# Patient Record
Sex: FEMALE | Race: WHITE | NOT HISPANIC OR LATINO | Employment: FULL TIME | URBAN - METROPOLITAN AREA
[De-identification: names, ages, dates, MRNs, and addresses within clinical notes are randomized per-mention and may not be internally consistent; named-entity substitution may affect disease eponyms.]

---

## 2018-10-25 ENCOUNTER — HOSPITAL ENCOUNTER (OUTPATIENT)
Dept: RADIOLOGY | Facility: CLINIC | Age: 58
Discharge: HOME | End: 2018-10-25
Attending: PODIATRIST
Payer: COMMERCIAL

## 2018-10-25 ENCOUNTER — OFFICE VISIT (OUTPATIENT)
Dept: PODIATRY | Facility: CLINIC | Age: 58
End: 2018-10-25
Payer: COMMERCIAL

## 2018-10-25 VITALS
DIASTOLIC BLOOD PRESSURE: 73 MMHG | WEIGHT: 203.25 LBS | BODY MASS INDEX: 30.8 KG/M2 | HEIGHT: 68 IN | SYSTOLIC BLOOD PRESSURE: 114 MMHG | HEART RATE: 76 BPM

## 2018-10-25 DIAGNOSIS — M25.572 ACUTE LEFT ANKLE PAIN: Primary | ICD-10-CM

## 2018-10-25 DIAGNOSIS — M76.822 POSTERIOR TIBIAL TENDONITIS, LEFT: ICD-10-CM

## 2018-10-25 DIAGNOSIS — M25.572 ACUTE LEFT ANKLE PAIN: ICD-10-CM

## 2018-10-25 PROCEDURE — 99999 PR PBB SHADOW E&M-NEW PATIENT-LVL III: CPT | Mod: PBBFAC,,, | Performed by: PODIATRIST

## 2018-10-25 PROCEDURE — 73610 X-RAY EXAM OF ANKLE: CPT | Mod: TC,FY,PO,LT

## 2018-10-25 PROCEDURE — 73610 X-RAY EXAM OF ANKLE: CPT | Mod: 26,LT,S$GLB, | Performed by: RADIOLOGY

## 2018-10-25 PROCEDURE — 3008F BODY MASS INDEX DOCD: CPT | Mod: CPTII,S$GLB,, | Performed by: PODIATRIST

## 2018-10-25 PROCEDURE — 99203 OFFICE O/P NEW LOW 30 MIN: CPT | Mod: S$GLB,,, | Performed by: PODIATRIST

## 2018-10-25 RX ORDER — METFORMIN HYDROCHLORIDE 500 MG/1
TABLET ORAL
COMMUNITY
End: 2019-10-28 | Stop reason: SDUPTHER

## 2018-10-25 RX ORDER — LISINOPRIL AND HYDROCHLOROTHIAZIDE 12.5; 2 MG/1; MG/1
TABLET ORAL
COMMUNITY
End: 2019-10-28 | Stop reason: CLARIF

## 2018-11-04 NOTE — PROGRESS NOTES
Subjective:      Patient ID: Nubia Flaherty is a 58 y.o. female.    Chief Complaint: Ankle Pain (left ankle)    Nubia is a 58 y.o. female who presents to the podiatry clinic  with complaint of  left foot pain. Onset of the symptoms was several weeks ago. Precipitating event: none known. Current symptoms include: ability to bear weight, but with some pain, swelling and worsening symptoms after a period of activity. Aggravating factors: any weight bearing. Symptoms have gradually worsened.  Evaluation to date: none. Treatment to date: none. Patients rates pain 1/10 on pain scale.        Review of Systems   Constitution: Negative for chills and fever.   Cardiovascular: Negative for claudication and leg swelling.   Respiratory: Negative for shortness of breath.    Skin: Negative for itching, nail changes and rash.   Musculoskeletal: Negative for muscle cramps, muscle weakness and myalgias.        Left ankle pain   Gastrointestinal: Negative for nausea and vomiting.   Neurological: Negative for focal weakness, loss of balance, numbness and paresthesias.           Objective:      Physical Exam   Constitutional: She is oriented to person, place, and time. She appears well-developed and well-nourished. No distress.   Cardiovascular:   Pulses:       Dorsalis pedis pulses are 2+ on the right side, and 2+ on the left side.        Posterior tibial pulses are 2+ on the right side, and 2+ on the left side.   < 3 sec capillary refill time to toes 1-5 bilateral. Toes and feet are warm to touch proximally with normal distal cooling b/l. There is some hair growth on the feet and toes b/l. There is no edema b/l. No spider veins or varicosities present b/l.      Musculoskeletal:   Left foot there is pain to palpation along the  PT tendon distally and with palpation to the anterior medial ankle area.     Equinus noted b/l ankles with < 10 deg DF noted. MMT 5/5 in DF/PF/Inv/Ev resistance with no reproduction of pain in any direction.  Passive range of motion of ankle and pedal joints is painless b/l.     Neurological: She is alert and oriented to person, place, and time. She has normal strength. She displays no atrophy and no tremor. No sensory deficit. She exhibits normal muscle tone.   Negative tinel sign bilateral.   Skin: Skin is warm, dry and intact. No abrasion, no bruising, no burn, no ecchymosis, no laceration, no lesion, no petechiae and no rash noted. She is not diaphoretic. No cyanosis or erythema. No pallor. Nails show no clubbing.   Skin temperature, texture and turgor within normal limits.   Psychiatric: She has a normal mood and affect. Her behavior is normal.             Assessment:       Encounter Diagnoses   Name Primary?    Acute left ankle pain Yes    Posterior tibial tendonitis, left          Plan:       Nubia was seen today for ankle pain.    Diagnoses and all orders for this visit:    Acute left ankle pain  -     X-Ray Ankle Complete Left; Future    Posterior tibial tendonitis, left  -     X-Ray Ankle Complete Left; Future      I counseled the patient on her conditions, their implications and medical management.    Patient will obtain over the counter arch supports and wear them in shoes whenever possible.  Athletic shoes intended for walking or running are usually best.    Patient will stretch the tendo achilles complex three times daily as demonstrated in the office.  Literature was dispensed illustrating proper stretching technique.    Avoid barefoot or flats, consider ankle brace if pain persists    Return in 6 weeks follow up    Darron Clements DPM

## 2019-03-04 ENCOUNTER — OFFICE VISIT (OUTPATIENT)
Dept: PODIATRY | Facility: CLINIC | Age: 59
End: 2019-03-04
Payer: COMMERCIAL

## 2019-03-04 VITALS
TEMPERATURE: 97 F | WEIGHT: 200 LBS | HEART RATE: 75 BPM | BODY MASS INDEX: 30.86 KG/M2 | SYSTOLIC BLOOD PRESSURE: 125 MMHG | DIASTOLIC BLOOD PRESSURE: 75 MMHG

## 2019-03-04 DIAGNOSIS — M72.2 PLANTAR FASCIITIS: Primary | ICD-10-CM

## 2019-03-04 DIAGNOSIS — M79.671 PAIN OF RIGHT HEEL: ICD-10-CM

## 2019-03-04 PROCEDURE — 99203 PR OFFICE/OUTPT VISIT, NEW, LEVL III, 30-44 MIN: ICD-10-PCS | Mod: 25,,, | Performed by: PODIATRIST

## 2019-03-04 PROCEDURE — 3008F BODY MASS INDEX DOCD: CPT | Mod: ,,, | Performed by: PODIATRIST

## 2019-03-04 PROCEDURE — 73630 X-RAY EXAM OF FOOT: CPT | Mod: RT,,, | Performed by: PODIATRIST

## 2019-03-04 PROCEDURE — 3008F PR BODY MASS INDEX (BMI) DOCUMENTED: ICD-10-PCS | Mod: ,,, | Performed by: PODIATRIST

## 2019-03-04 PROCEDURE — 99203 OFFICE O/P NEW LOW 30 MIN: CPT | Mod: 25,,, | Performed by: PODIATRIST

## 2019-03-04 PROCEDURE — 73630 PR  X-RAY FOOT 3+ VW: ICD-10-PCS | Mod: RT,,, | Performed by: PODIATRIST

## 2019-03-04 NOTE — PROGRESS NOTES
[address] [facility] [address]  [place] [address]  Phone: [phone]   [phone]    Patient's PCP:Ankit Hardin MD  Referring Provider: No ref. provider found    Subjective:      Chief Complaint:: Heel Pain (right foot)    JUDY Flaherty is a 59 y.o. female who presents with a complaint of right heel pain lasting since yesterday evening. Onset of the symptoms was playing ball with grandkids and must've lunged or something and now cannot put any pressure on heel at all.  Current symptoms include stabbing pain.  Aggravating factors are walking. Symptoms have gotten worse. Treatment to date have included boot cast, tennis shoes with inserts (pt assumed was PF because has had it before). Patients rates pain 8/10 on pain scale.    Pt is borderline diabetic.  Systemic Doctor: Robert Murray  Date Last Seen: November-December 2018  Blood Sugar: does not check  Hemoglobin A1c: 6.5    Vitals:    03/04/19 1014   BP: 125/75   Pulse: 75   Temp: 97 °F (36.1 °C)     Shoe Size: 9 / 10    Past Surgical History:   Procedure Laterality Date    FOOT SURGERY Left 2015    bunion     TONSILLECTOMY, ADENOIDECTOMY       Past Medical History:   Diagnosis Date    Hypertension     Kidney stones      Family History   Problem Relation Age of Onset    Heart disease Father     Diabetes Father     Heart disease Mother     Diabetes Mother     Breast cancer Maternal Aunt     Ovarian cancer Neg Hx         Social History:   Marital Status:   Alcohol History:  reports that she does not drink alcohol.  Tobacco History:  reports that she has quit smoking. She does not have any smokeless tobacco history on file.  Drug History:  reports that she does not use drugs.    Review of patient's allergies indicates:   Allergen Reactions    Codeine Tinitus    Morphine        Current Outpatient Medications   Medication Sig Dispense Refill    lisinopril-hydrochlorothiazide (PRINZIDE,ZESTORETIC) 20-12.5 mg per tablet Take by mouth.       metFORMIN (GLUCOPHAGE) 500 MG tablet Take by mouth.       No current facility-administered medications for this visit.        Review of Systems   Constitutional: Positive for chills, fatigue and fever. Negative for unexpected weight change.   HENT: Negative for hearing loss and trouble swallowing.    Eyes: Negative for photophobia and visual disturbance.   Respiratory: Positive for cough. Negative for shortness of breath and wheezing.    Cardiovascular: Positive for leg swelling. Negative for chest pain and palpitations.   Gastrointestinal: Negative for abdominal pain and nausea.   Genitourinary: Negative for dysuria and frequency.   Musculoskeletal: Positive for joint swelling. Negative for arthralgias and back pain.   Skin: Negative for rash.   Neurological: Negative for tremors, seizures, weakness, numbness and headaches.   Hematological: Does not bruise/bleed easily.         Objective:        Physical Exam:   Foot Exam    General  General Appearance: appears stated age and healthy   Orientation: alert and oriented to person, place, and time   Affect: appropriate   Gait: unimpaired       Right Foot/Ankle     Inspection and Palpation  Ecchymosis: none  Tenderness: plantar fascia   Swelling: plantar fascia   Arch: normal  Hammertoes: absent  Claw Toes: absent  Skin Exam: skin intact;     Neurovascular  Dorsalis pedis: 2+  Posterior tibial: 2+    Muscle Strength  Ankle dorsiflexion: 5  Ankle plantar flexion: 5  Ankle inversion: 5  Ankle eversion: 5  Great toe extension: 5  Great toe flexion: 5    Range of Motion    Normal right ankle ROM    Comments  Pain on palpation of the infeior medial heel. No pain present  with side to side compression of the calcaneus. Negative tinnel's sign  at the tarsal tunnel. Negative Scruggs's nerve pain. Negative Calcaneal nerve pain. No soft tissue masses. Pain absent  with dorsiflexion of the ankle. No edema, erythema, or ecchymosis noted.       Left Foot/Ankle      Inspection and  Palpation  Ecchymosis: none  Tenderness: none   Swelling: none   Arch: normal  Hammertoes: absent  Claw toes: absent  Hallux valgus: yes  Skin Exam: skin intact;     Neurovascular  Dorsalis pedis: 2+  Posterior tibial: 2+  Saphenous nerve sensation: normal  Tibial nerve sensation: normal  Superficial peroneal nerve sensation: normal  Deep peroneal nerve sensation: normal  Sural nerve sensation: normal    Muscle Strength  Ankle dorsiflexion: 5  Ankle plantar flexion: 5  Ankle inversion: 5  Ankle eversion: 5  Great toe extension: 5  Great toe flexion: 5          Physical Exam   Cardiovascular:   Pulses:       Dorsalis pedis pulses are 2+ on the right side, and 2+ on the left side.        Posterior tibial pulses are 2+ on the right side, and 2+ on the left side.       Imaging: X-Ray Foot Complete Right  An AP, oblique, lateral, and calcaneal axial view of right foot was obtained and reviewed.    There is no evidence of fracture or dislocation in the foot.    The joint spaces appear relatively well preserved.    There is a small calcaneal spur at the plantar fascia insertion.    No bone tumors or soft tissue masses noted.     Electronically Signed By: Matt Talbot DPM               Assessment:       1. Plantar fasciitis    2. Pain of right heel      Plan:   Plantar fasciitis    Pain of right heel  -     X-Ray Foot Complete Right    Other orders  -     Cancel: X-Ray Calcaneus 2 View Right      Follow-up if symptoms worsen or fail to improve.    Procedures - Plantar Fasciitis Level I Treatment:   Anti-inflammatory  No bare feet  Over the counter insert  Restrict activity level   Use running shoes at full time  No impact exercise and stretch gastrocnemius muscle    CAM walker boot with weight bearing to right foot   Ice to painful area, 15 minutes at a time  No barefoot         Counseling:     I provided patient education verbally regarding:   Patient diagnosis, treatment options, as well as alternatives, risks, and  benefits.     Discussed different treatment options for heel pain. I gave written and verbal instructions on heel cord stretching and this was demonstrated for the patient. Patient expressed understanding. Discussed wearing appropriate shoe gear and avoiding flats, slippers, sandals, barefoot, and sockfeet. Recommended arch supports. My recommendation for OTC supports is Spenco polysorb replacement insoles or patient may elect more aggressive treatment with prescription arch supports. We also discussed cortisone injections and NSAID therapy.       This note was created using Dragon voice recognition software that occasionally misinterpreted phrases or words.

## 2019-03-04 NOTE — PATIENT INSTRUCTIONS
Plantar Fasciitis  Plantar fasciitis is a painful swelling of the plantar fascia. The plantar fascia is a thick, fibrous layer of tissue. It covers the bones on the bottom of your foot. And it supports the foot bones in an arched position.  This can happen gradually or suddenly. It usually affects one foot at a time. Heel pain can be sharp, like a knife sticking into the bottom of your foot. You may feel pain after exercising, long-distance jogging, stair climbing, long periods of standing, or after standing up.  Risk factors include: non-active lifestyle, arthritis, diabetes, obesity or recent weight gain, flat foot, high arch. Wearing high heels, loose shoes, or shoes with poor arch support for long periods of time adds to the risk. This problem is commonly found in runners and dancers. It also found in people who stand on hard surfaces for long periods of time.  Foot pain from this condition is usually worse in the morning. But it improves with walking. By the end of the day there may be a dull aching. Treatment requires short-term rest and controlling swelling. It may take up to 9 months before all symptoms go away. Rarely, a steroid injection into the foot, or surgery, may be needed.  Home care  · If you are overweight, lose weight to help healing.  · Choose supportive shoes with good arch support and shock absorbency. Replace athletic shoes when they become worn out. Dont walk or run barefoot.  · Premade or custom-fitted shoe inserts may be helpful. Inserts made of silicone seem to be the most effective. Custom-made inserts can be provided by a podiatrist or foot specialist, physical therapist, or orthopedist.  · Premade or custom-made night splints keep the heel stretched out while you sleep. They may prevent morning pain.  · Avoid activities that stress the feet: jogging, prolonged standing or walking, contact sports, etc.  · First thing in the morning and before sports, stretch the bottom of your feet.  Gently flex your ankle so the toes move toward your knee.  · Icing may help control heel pain. Apply an ice pack to the heel for 10-20 minutes as a preventive. Or ice your heel after a severe flare-up of symptoms. You may repeat this every 1-2 hours as needed.  · You may use over-the-counter pain medicine to control pain, unless another medicine was prescribed. Anti-inflammatory pain medicines, such as ibuprofen or naproxen, may work better than acetaminophen. If you have chronic liver or kidney disease or ever had a stomach ulcer or GI bleeding, talk with your healthcare provider before using these medicines.  Follow-up care  Follow up with your healthcare provider, physical therapist, or podiatrist or foot specialist as advised.  Call for an appointment if pain worsens or there is no relief after a few weeks of home treatment. Shoe inserts, a night splint, or a special boot may be required.  If X-rays were taken, you will be told of any new findings that may affect your care.  When to seek medical advice  Call your healthcare provider right away if any of these occur:  · Foot swelling  · Redness with increasing pain  Date Last Reviewed: 11/21/2015  © 9489-8696 [employer]. [address] [place] [address], [place] [address]. All rights reserved. This information is not intended as a substitute for professional medical care. Always follow your healthcare professional's instructions.

## 2019-10-28 ENCOUNTER — OFFICE VISIT (OUTPATIENT)
Dept: FAMILY MEDICINE | Facility: CLINIC | Age: 59
End: 2019-10-28
Payer: COMMERCIAL

## 2019-10-28 VITALS
WEIGHT: 206 LBS | BODY MASS INDEX: 31.22 KG/M2 | DIASTOLIC BLOOD PRESSURE: 80 MMHG | HEART RATE: 84 BPM | HEIGHT: 68 IN | SYSTOLIC BLOOD PRESSURE: 130 MMHG

## 2019-10-28 DIAGNOSIS — Z12.11 SCREENING FOR COLON CANCER: ICD-10-CM

## 2019-10-28 DIAGNOSIS — I10 ESSENTIAL HYPERTENSION: Primary | ICD-10-CM

## 2019-10-28 DIAGNOSIS — E11.9 TYPE 2 DIABETES MELLITUS WITHOUT COMPLICATION, WITHOUT LONG-TERM CURRENT USE OF INSULIN: ICD-10-CM

## 2019-10-28 DIAGNOSIS — E78.49 OTHER HYPERLIPIDEMIA: ICD-10-CM

## 2019-10-28 LAB — HBA1C MFR BLD: 7.4 %

## 2019-10-28 PROCEDURE — 83036 HEMOGLOBIN GLYCOSYLATED A1C: CPT | Mod: QW,,, | Performed by: PHYSICIAN ASSISTANT

## 2019-10-28 PROCEDURE — 3008F PR BODY MASS INDEX (BMI) DOCUMENTED: ICD-10-PCS | Mod: S$GLB,,, | Performed by: PHYSICIAN ASSISTANT

## 2019-10-28 PROCEDURE — 3079F PR MOST RECENT DIASTOLIC BLOOD PRESSURE 80-89 MM HG: ICD-10-PCS | Mod: S$GLB,,, | Performed by: PHYSICIAN ASSISTANT

## 2019-10-28 PROCEDURE — 3075F PR MOST RECENT SYSTOLIC BLOOD PRESS GE 130-139MM HG: ICD-10-PCS | Mod: S$GLB,,, | Performed by: PHYSICIAN ASSISTANT

## 2019-10-28 PROCEDURE — 83036 POCT HEMOGLOBIN A1C: ICD-10-PCS | Mod: QW,,, | Performed by: PHYSICIAN ASSISTANT

## 2019-10-28 PROCEDURE — 99214 OFFICE O/P EST MOD 30 MIN: CPT | Mod: S$GLB,,, | Performed by: PHYSICIAN ASSISTANT

## 2019-10-28 PROCEDURE — 3008F BODY MASS INDEX DOCD: CPT | Mod: S$GLB,,, | Performed by: PHYSICIAN ASSISTANT

## 2019-10-28 PROCEDURE — 3075F SYST BP GE 130 - 139MM HG: CPT | Mod: S$GLB,,, | Performed by: PHYSICIAN ASSISTANT

## 2019-10-28 PROCEDURE — 99214 PR OFFICE/OUTPT VISIT, EST, LEVL IV, 30-39 MIN: ICD-10-PCS | Mod: S$GLB,,, | Performed by: PHYSICIAN ASSISTANT

## 2019-10-28 PROCEDURE — 3079F DIAST BP 80-89 MM HG: CPT | Mod: S$GLB,,, | Performed by: PHYSICIAN ASSISTANT

## 2019-10-28 RX ORDER — TELMISARTAN 20 MG/1
20 TABLET ORAL DAILY
COMMUNITY
Start: 2019-05-01 | End: 2019-10-28 | Stop reason: SDUPTHER

## 2019-10-28 RX ORDER — TELMISARTAN 20 MG/1
20 TABLET ORAL DAILY
Qty: 90 TABLET | Refills: 1 | Status: SHIPPED | OUTPATIENT
Start: 2019-10-28 | End: 2020-01-27 | Stop reason: SDUPTHER

## 2019-10-28 RX ORDER — METFORMIN HYDROCHLORIDE 1000 MG/1
1000 TABLET ORAL
Qty: 90 TABLET | Refills: 0 | Status: SHIPPED | OUTPATIENT
Start: 2019-10-28 | End: 2020-01-27 | Stop reason: SDUPTHER

## 2019-10-28 NOTE — PROGRESS NOTES
SUBJECTIVE:    Patient ID: Nubia Flaherty is a 59 y.o. female.    Chief Complaint: Diabetes (6 month follow up.....Pt did NOT bring medications to this visit)    58 yo wf presents for checkup. She reports that she has been settling back down. Some stress with her parents and . A1c down to 7.4% and right close to goal. She tries to really watch her diet. She is retired now and enjoying some more free time on her hands.      No visits with results within 6 Month(s) from this visit.   Latest known visit with results is:   No results found for any previous visit.       Past Medical History:   Diagnosis Date    Hypertension     Kidney stones     Prediabetes      Past Surgical History:   Procedure Laterality Date    FOOT SURGERY Left 2015    bunion     TONSILLECTOMY, ADENOIDECTOMY       Family History   Problem Relation Age of Onset    Heart disease Father     Diabetes Father     Heart disease Mother     Diabetes Mother     Breast cancer Maternal Aunt     Ovarian cancer Neg Hx        Marital Status:   Alcohol History:  reports that she does not drink alcohol.  Tobacco History:  reports that she has never smoked. She has never used smokeless tobacco.  Drug History:  reports that she does not use drugs.    Review of patient's allergies indicates:   Allergen Reactions    Codeine Tinitus    Morphine        Current Outpatient Medications:     metFORMIN (GLUCOPHAGE) 1000 MG tablet, Take 1 tablet (1,000 mg total) by mouth daily with breakfast., Disp: 90 tablet, Rfl: 0    telmisartan (MICARDIS) 20 MG Tab, Take 1 tablet (20 mg total) by mouth once daily., Disp: 90 tablet, Rfl: 1    Review of Systems   Constitutional: Negative for appetite change, chills, fatigue, fever and unexpected weight change.   HENT: Negative for congestion.    Respiratory: Negative for cough, chest tightness and shortness of breath.    Cardiovascular: Negative for chest pain and palpitations.   Gastrointestinal: Negative for  "abdominal distention and abdominal pain.   Endocrine: Negative for cold intolerance and heat intolerance.   Genitourinary: Negative for difficulty urinating and dysuria.   Musculoskeletal: Negative for arthralgias and back pain.   Neurological: Negative for dizziness, weakness and headaches.          Objective:      Vitals:    10/28/19 0908   BP: 130/80   Pulse: 84   Weight: 93.4 kg (206 lb)   Height: 5' 7.5" (1.715 m)     Physical Exam   Constitutional: She is oriented to person, place, and time. She appears well-developed and well-nourished. No distress.   HENT:   Head: Normocephalic and atraumatic.   Eyes: Pupils are equal, round, and reactive to light. Conjunctivae and EOM are normal.   Neck: Normal range of motion. Neck supple. No thyromegaly present.   Cardiovascular: Normal rate, regular rhythm, normal heart sounds and intact distal pulses.   Pulmonary/Chest: Effort normal and breath sounds normal.   Abdominal: Soft. Bowel sounds are normal. She exhibits no distension. There is no tenderness.   Musculoskeletal: Normal range of motion.   Neurological: She is alert and oriented to person, place, and time. No cranial nerve deficit.   Skin: Skin is warm and dry. No erythema.   Psychiatric: She has a normal mood and affect.         Assessment:       1. Essential hypertension    2. Other hyperlipidemia    3. Type 2 diabetes mellitus without complication, without long-term current use of insulin    4. Screening for colon cancer         Plan:       Essential hypertension  Comments:  Very well controlled. Continue as is.  Orders:  -     telmisartan (MICARDIS) 20 MG Tab; Take 1 tablet (20 mg total) by mouth once daily.  Dispense: 90 tablet; Refill: 1    Other hyperlipidemia    Type 2 diabetes mellitus without complication, without long-term current use of insulin  Comments:  A1c 7.4 % and right close to goal. She is agreeable to trulicity if we cant get a1c down with 1000 metformin. Can followup in 3 mos.  Orders:  -   "   Hemoglobin A1C, POCT  -     metFORMIN (GLUCOPHAGE) 1000 MG tablet; Take 1 tablet (1,000 mg total) by mouth daily with breakfast.  Dispense: 90 tablet; Refill: 0    Screening for colon cancer  -     Cologuard Screening (Multitarget Stool DNA); Future; Expected date: 10/28/2019      Follow up in about 3 months (around 1/28/2020) for Diabetic Check-Up.        10/28/2019 Robert Suarez PA-C

## 2019-10-28 NOTE — PATIENT INSTRUCTIONS
"  Exercise to Manage Your Blood Sugar    Being physically active every day can help you manage your blood sugar. Thats because an active lifestyle can improve your bodys ability to use insulin. Daily activity can also help delay or prevent complications of diabetes. And its a great way to relieve stress. If you arent normally active, be sure to consult your healthcare provider before getting started.  How much activity do you need?  If daily activity is new to you, start slow and steady. Begin with 10 minutes of activity each day. Then work up to at least 150 minutes a week of physical activity. Don't let more than 2 days go by without being active. When sitting for long periods of time, get up for short sessions of light activity every 30 minutes  Just move!  You dont have to join a gym or own pricey sports equipment. Just get out and walk. Walking is an aerobic exercise that makes your heart and lungs work hard. It helps your heart and blood vessels. Walking needs only a sturdy pair of sneakers and your own two feet. The more you walk, the easier it gets:  · Schedule time every day to move your feet.  · Make it part of your daily routine.  · Walk with a friend or a group to keep it interesting and fun.  · Try taking several short walks during the day to meet your daily activity goal.  A pedometer makes every step count  A pedometer is a small device that keeps track of how many steps you take. You can clip it to your belt (or a strap on your arm or leg) and go about your daily routine. "Smartphones" now also have apps to record your walking. At the end of the day, the pedometer shows the total number of steps you took. Use a pedometer to set daily goals for yourself. For instance, if you walk 4,000 steps a day, try adding 200 more steps each day. Aim for a goal of 7,500. With every step, youre doing a little more to help your body use insulin.   Adding resistance exercise  Resistance exercise (also called " strength training), makes muscles stronger. It also helps muscles use insulin better. Ask your healthcare provider whether this type of exercise is right for you. If it is, your healthcare provider can help you work it in to your activity plan.  Staying safe  Being active may cause blood sugar to drop faster than usual. This is especially true if you take medicine to manage your blood sugar. But there are things you can do to help reduce the risk of accidental lows. Keep these tips in mind:  · Always carry identification when you exercise outside your home. Carry a cell phone to use in case of emergency.  · If you can, include friends and family in your activities.  · Wear a medical ID bracelet that says you have diabetes.  · Use the right safety equipment for the activity you do (such as a bicycle helmet when you ride a bicycle outdoors). Wear closed-toed shoes that fit your feet well.  · Drink plenty of water before and during activity.  · Keep a fast-acting sugar (such as glucose tablets) on hand in case of low blood sugar.  · Dress properly for the weather. Wear a hat if its vi, or wait until evening if its too hot.  · Avoid being active for long periods in very hot or very cold weather.  · Skip activity if youre sick.     Notice how activity affects blood sugar  Physical activity is important when you have diabetes. But you need to keep an eye on your blood sugar level. Check often if you have been active for longer than usual, or if the activity was unplanned. Make it a habit to check your blood sugar before being active. And check again a few hours later. Use your log book to write down how activity affects your numbers. If you take insulin, you may be able to adjust your dose before a planned activity. This can help prevent lows. You may also need to take a small carbohydrate snack before the exercise. Talk to your healthcare provider to learn more.    Date Last Reviewed: 6/1/2016  © 6615-8506 [employer]. [address] [place] [address], [place] [address]. All rights reserved. This information is not intended as a substitute for professional medical care. Always follow your healthcare professional's instructions.

## 2019-11-10 ENCOUNTER — HOSPITAL ENCOUNTER (OUTPATIENT)
Facility: HOSPITAL | Age: 59
Discharge: HOME | End: 2019-11-11
Attending: EMERGENCY MEDICINE | Admitting: INTERNAL MEDICINE
Payer: COMMERCIAL

## 2019-11-10 DIAGNOSIS — R07.9 CHEST PAIN: Primary | ICD-10-CM

## 2019-11-10 LAB
BASOPHILS # BLD AUTO: 0.05 K/UL (ref 0–0.2)
BASOPHILS NFR BLD: 0.9 % (ref 0–1.9)
DIFFERENTIAL METHOD: ABNORMAL
EOSINOPHIL # BLD AUTO: 0.3 K/UL (ref 0–0.5)
EOSINOPHIL NFR BLD: 4.5 % (ref 0–8)
ERYTHROCYTE [DISTWIDTH] IN BLOOD BY AUTOMATED COUNT: 13.6 % (ref 11.5–14.5)
HCT VFR BLD AUTO: 40.9 % (ref 37–48.5)
HGB BLD-MCNC: 13.6 G/DL (ref 12–16)
IMM GRANULOCYTES # BLD AUTO: 0.01 K/UL (ref 0–0.04)
IMM GRANULOCYTES NFR BLD AUTO: 0.2 % (ref 0–0.5)
LYMPHOCYTES # BLD AUTO: 3.1 K/UL (ref 1–4.8)
LYMPHOCYTES NFR BLD: 55.1 % (ref 18–48)
MCH RBC QN AUTO: 30 PG (ref 27–31)
MCHC RBC AUTO-ENTMCNC: 33.3 G/DL (ref 32–36)
MCV RBC AUTO: 90 FL (ref 82–98)
MONOCYTES # BLD AUTO: 0.6 K/UL (ref 0.3–1)
MONOCYTES NFR BLD: 10.1 % (ref 4–15)
NEUTROPHILS # BLD AUTO: 1.6 K/UL (ref 1.8–7.7)
NEUTROPHILS NFR BLD: 29.2 % (ref 38–73)
NRBC BLD-RTO: 0 /100 WBC
PLATELET # BLD AUTO: 249 K/UL (ref 150–350)
PMV BLD AUTO: 10.1 FL (ref 9.2–12.9)
RBC # BLD AUTO: 4.54 M/UL (ref 4–5.4)
WBC # BLD AUTO: 5.54 K/UL (ref 3.9–12.7)

## 2019-11-10 PROCEDURE — 85025 COMPLETE CBC W/AUTO DIFF WBC: CPT

## 2019-11-10 PROCEDURE — 85610 PROTHROMBIN TIME: CPT

## 2019-11-10 PROCEDURE — 99285 EMERGENCY DEPT VISIT HI MDM: CPT | Mod: 25

## 2019-11-10 PROCEDURE — 80053 COMPREHEN METABOLIC PANEL: CPT

## 2019-11-10 PROCEDURE — 93005 ELECTROCARDIOGRAM TRACING: CPT

## 2019-11-10 PROCEDURE — 83880 ASSAY OF NATRIURETIC PEPTIDE: CPT

## 2019-11-10 PROCEDURE — 84484 ASSAY OF TROPONIN QUANT: CPT

## 2019-11-10 RX ORDER — NAPROXEN SODIUM 220 MG/1
324 TABLET, FILM COATED ORAL ONCE
Status: COMPLETED | OUTPATIENT
Start: 2019-11-11 | End: 2019-11-11

## 2019-11-11 ENCOUNTER — CLINICAL SUPPORT (OUTPATIENT)
Dept: CARDIOLOGY | Facility: HOSPITAL | Age: 59
End: 2019-11-11
Attending: EMERGENCY MEDICINE
Payer: COMMERCIAL

## 2019-11-11 ENCOUNTER — HOSPITAL ENCOUNTER (OUTPATIENT)
Dept: RADIOLOGY | Facility: HOSPITAL | Age: 59
Discharge: HOME | End: 2019-11-11
Attending: EMERGENCY MEDICINE
Payer: COMMERCIAL

## 2019-11-11 VITALS
SYSTOLIC BLOOD PRESSURE: 127 MMHG | WEIGHT: 207.56 LBS | TEMPERATURE: 98 F | HEART RATE: 82 BPM | BODY MASS INDEX: 32.58 KG/M2 | HEIGHT: 67 IN | OXYGEN SATURATION: 96 % | RESPIRATION RATE: 20 BRPM | DIASTOLIC BLOOD PRESSURE: 78 MMHG

## 2019-11-11 PROBLEM — R07.9 CHEST PAIN: Status: ACTIVE | Noted: 2019-11-11

## 2019-11-11 PROBLEM — R07.9 CHEST PAIN: Status: RESOLVED | Noted: 2019-11-11 | Resolved: 2019-11-11

## 2019-11-11 LAB
ALBUMIN SERPL BCP-MCNC: 4 G/DL (ref 3.5–5.2)
ALP SERPL-CCNC: 46 U/L (ref 55–135)
ALT SERPL W/O P-5'-P-CCNC: 28 U/L (ref 10–44)
ANION GAP SERPL CALC-SCNC: 10 MMOL/L (ref 8–16)
AST SERPL-CCNC: 20 U/L (ref 10–40)
BILIRUB SERPL-MCNC: 0.8 MG/DL (ref 0.1–1)
BNP SERPL-MCNC: 32 PG/ML (ref 0–99)
BUN SERPL-MCNC: 19 MG/DL (ref 8–23)
CALCIUM SERPL-MCNC: 9.3 MG/DL (ref 8.7–10.5)
CHLORIDE SERPL-SCNC: 104 MMOL/L (ref 95–110)
CO2 SERPL-SCNC: 25 MMOL/L (ref 23–29)
CREAT SERPL-MCNC: 0.9 MG/DL (ref 0.5–1.4)
CV STRESS BASE HR: 79 BPM
DIASTOLIC BLOOD PRESSURE: 90 MMHG
EST. GFR  (AFRICAN AMERICAN): >60 ML/MIN/1.73 M^2
EST. GFR  (NON AFRICAN AMERICAN): >60 ML/MIN/1.73 M^2
GLUCOSE SERPL-MCNC: 171 MG/DL (ref 70–110)
GLUCOSE SERPL-MCNC: 184 MG/DL (ref 70–110)
INR PPP: 0.9
OHS CV CPX 85 PERCENT MAX PREDICTED HEART RATE MALE: 129
OHS CV CPX MAX PREDICTED HEART RATE: 152
OHS CV CPX PATIENT IS FEMALE: 1
OHS CV CPX PATIENT IS MALE: 0
OHS CV CPX PEAK DIASTOLIC BLOOD PRESSURE: 67 MMHG
OHS CV CPX PEAK HEAR RATE: 150 BPM
OHS CV CPX PEAK RATE PRESSURE PRODUCT: NORMAL
OHS CV CPX PEAK SYSTOLIC BLOOD PRESSURE: 174 MMHG
OHS CV CPX PERCENT MAX PREDICTED HEART RATE ACHIEVED: 98
OHS CV CPX RATE PRESSURE PRODUCT PRESENTING: NORMAL
POTASSIUM SERPL-SCNC: 3.8 MMOL/L (ref 3.5–5.1)
PROT SERPL-MCNC: 6.9 G/DL (ref 6–8.4)
PROTHROMBIN TIME: 11.8 SEC (ref 10.6–14.8)
SODIUM SERPL-SCNC: 139 MMOL/L (ref 136–145)
STRESS ECHO POST EXERCISE DUR MIN: 5 MINUTES
STRESS ECHO POST EXERCISE DUR SEC: 0 SECONDS
STRESS ECHO TARGET HR: 135 BPM
SYSTOLIC BLOOD PRESSURE: 136 MMHG
TROPONIN I SERPL DL<=0.01 NG/ML-MCNC: <0.03 NG/ML (ref 0.02–0.04)

## 2019-11-11 PROCEDURE — 25000003 PHARM REV CODE 250: Performed by: EMERGENCY MEDICINE

## 2019-11-11 PROCEDURE — G0378 HOSPITAL OBSERVATION PER HR: HCPCS

## 2019-11-11 PROCEDURE — 25000003 PHARM REV CODE 250: Performed by: NURSE PRACTITIONER

## 2019-11-11 PROCEDURE — 94761 N-INVAS EAR/PLS OXIMETRY MLT: CPT

## 2019-11-11 PROCEDURE — 84484 ASSAY OF TROPONIN QUANT: CPT

## 2019-11-11 PROCEDURE — 93017 CV STRESS TEST TRACING ONLY: CPT

## 2019-11-11 PROCEDURE — 36415 COLL VENOUS BLD VENIPUNCTURE: CPT

## 2019-11-11 PROCEDURE — A9502 TC99M TETROFOSMIN: HCPCS

## 2019-11-11 RX ORDER — INSULIN ASPART 100 [IU]/ML
0-5 INJECTION, SOLUTION INTRAVENOUS; SUBCUTANEOUS
Status: DISCONTINUED | OUTPATIENT
Start: 2019-11-11 | End: 2019-11-11 | Stop reason: HOSPADM

## 2019-11-11 RX ORDER — BISACODYL 10 MG
10 SUPPOSITORY, RECTAL RECTAL DAILY PRN
Status: DISCONTINUED | OUTPATIENT
Start: 2019-11-11 | End: 2019-11-11 | Stop reason: HOSPADM

## 2019-11-11 RX ORDER — ELETRIPTAN HYDROBROMIDE 40 MG/1
40 TABLET, FILM COATED ORAL ONCE AS NEEDED
COMMUNITY
End: 2023-12-22

## 2019-11-11 RX ORDER — NITROGLYCERIN 0.4 MG/1
0.4 TABLET SUBLINGUAL EVERY 5 MIN PRN
Status: DISCONTINUED | OUTPATIENT
Start: 2019-11-11 | End: 2019-11-11 | Stop reason: HOSPADM

## 2019-11-11 RX ORDER — SODIUM CHLORIDE 0.9 % (FLUSH) 0.9 %
3 SYRINGE (ML) INJECTION
Status: DISCONTINUED | OUTPATIENT
Start: 2019-11-11 | End: 2019-11-11 | Stop reason: HOSPADM

## 2019-11-11 RX ORDER — IBUPROFEN 200 MG
24 TABLET ORAL
Status: DISCONTINUED | OUTPATIENT
Start: 2019-11-11 | End: 2019-11-11 | Stop reason: HOSPADM

## 2019-11-11 RX ORDER — ACETAMINOPHEN 325 MG/1
650 TABLET ORAL EVERY 4 HOURS PRN
Status: DISCONTINUED | OUTPATIENT
Start: 2019-11-11 | End: 2019-11-11 | Stop reason: HOSPADM

## 2019-11-11 RX ORDER — NAPROXEN SODIUM 220 MG/1
81 TABLET, FILM COATED ORAL DAILY
Status: DISCONTINUED | OUTPATIENT
Start: 2019-11-11 | End: 2019-11-11 | Stop reason: HOSPADM

## 2019-11-11 RX ORDER — IBUPROFEN 200 MG
16 TABLET ORAL
Status: DISCONTINUED | OUTPATIENT
Start: 2019-11-11 | End: 2019-11-11 | Stop reason: HOSPADM

## 2019-11-11 RX ORDER — PANTOPRAZOLE SODIUM 40 MG/1
40 TABLET, DELAYED RELEASE ORAL DAILY
Qty: 30 TABLET | Refills: 1 | Status: SHIPPED | OUTPATIENT
Start: 2019-11-11 | End: 2020-06-02

## 2019-11-11 RX ORDER — ONDANSETRON 2 MG/ML
4 INJECTION INTRAMUSCULAR; INTRAVENOUS EVERY 8 HOURS PRN
Status: DISCONTINUED | OUTPATIENT
Start: 2019-11-11 | End: 2019-11-11 | Stop reason: HOSPADM

## 2019-11-11 RX ORDER — TELMISARTAN 20 MG/1
20 TABLET ORAL DAILY
Status: DISCONTINUED | OUTPATIENT
Start: 2019-11-11 | End: 2019-11-11 | Stop reason: HOSPADM

## 2019-11-11 RX ORDER — GLUCAGON 1 MG
1 KIT INJECTION
Status: DISCONTINUED | OUTPATIENT
Start: 2019-11-11 | End: 2019-11-11 | Stop reason: HOSPADM

## 2019-11-11 RX ADMIN — ASPIRIN 81 MG 324 MG: 81 TABLET ORAL at 12:11

## 2019-11-11 RX ADMIN — TELMISARTAN 20 MG: 20 TABLET ORAL at 10:11

## 2019-11-11 NOTE — PROGRESS NOTES
@  10:10       OBTAINED STRESS IMAGES.    @ 10:45       NUCLEAR MEDICINE MYOPERFUSION STUDY COMPLETED.    GLADIS WILLIAM

## 2019-11-11 NOTE — ASSESSMENT & PLAN NOTE
Normal EKG and troponin  Has risk factors:  Diabetes mellitus, hypertension, family history of CAD  Cardiac monitor for arrhythmia  Trend troponin  Aspirin and nitroglycerin  Nuclear treadmill stress test later today

## 2019-11-11 NOTE — PLAN OF CARE
11/11/19 1219   Final Note   Assessment Type Final Discharge Note   Anticipated Discharge Disposition Home

## 2019-11-11 NOTE — ED PROVIDER NOTES
"Encounter Date: 11/10/2019       History     Chief Complaint   Patient presents with    Chest Pain     pt reports pain that originates at the epigastric and radiates up to her jaw that began at 9pm tonight     Chief complaint is midsternal pain that radiates up to her jaw and throat" and grabs her" she states that "hits me like a spasm".  She states her 1st attack was 3 hr ago she has had multiple attacks since then where the last up to 2 min in nature.  It occurred was sitting on the couch.  She had the same if pain in 2009 evaluated admitted stress test etc.  Stress test negative   She had another attack in 2014.  She does have a history of having a gastroscope which was negative and a workup in 2009 that was negative per her history.  She does not smoke.  She does not have high cholesterol.  She does have hypertension and diabetes.  She does have a strong family history of heart disease.  Three brothers had MIs at age 35, 58 and approximately 55.  Both parents have heart disease.  During my interview and exam she has no pain.        Review of patient's allergies indicates:   Allergen Reactions    Codeine Tinitus    Morphine      Past Medical History:   Diagnosis Date    Hypertension     Kidney stones     Prediabetes      Past Surgical History:   Procedure Laterality Date    FOOT SURGERY Left 2015    bunion     TONSILLECTOMY, ADENOIDECTOMY       Family History   Problem Relation Age of Onset    Heart disease Father     Diabetes Father     Heart disease Mother     Diabetes Mother     Breast cancer Maternal Aunt     Ovarian cancer Neg Hx      Social History     Tobacco Use    Smoking status: Never Smoker    Smokeless tobacco: Never Used   Substance Use Topics    Alcohol use: No    Drug use: Never     Review of Systems   Constitutional: Negative for chills and fever.   HENT: Negative for ear pain, rhinorrhea and sore throat.    Eyes: Negative for pain and visual disturbance.   Respiratory: Negative " for cough and shortness of breath.    Cardiovascular: Positive for chest pain. Negative for palpitations.   Gastrointestinal: Negative for abdominal pain, constipation, diarrhea, nausea and vomiting.   Genitourinary: Negative for dysuria, frequency, hematuria and urgency.   Musculoskeletal: Negative for back pain, joint swelling and myalgias.   Skin: Negative for rash.   Neurological: Negative for dizziness, seizures, weakness and headaches.   Psychiatric/Behavioral: Negative for dysphoric mood. The patient is not nervous/anxious.        Physical Exam     Initial Vitals [11/10/19 2312]   BP Pulse Resp Temp SpO2   (!) 162/71 72 16 97.7 °F (36.5 °C) 98 %      MAP       --         Physical Exam    Nursing note and vitals reviewed.  Constitutional: She appears well-developed and well-nourished.   HENT:   Head: Normocephalic and atraumatic.   Eyes: Conjunctivae, EOM and lids are normal. Pupils are equal, round, and reactive to light.   Neck: Trachea normal and normal range of motion. Neck supple. No thyroid mass and no thyromegaly present.   Cardiovascular: Normal rate, regular rhythm and normal heart sounds.   Pulmonary/Chest: Effort normal and breath sounds normal.   Abdominal: Soft. Normal appearance and bowel sounds are normal. There is no tenderness.   Musculoskeletal: Normal range of motion.   Neurological: She is alert and oriented to person, place, and time. She has normal strength and normal reflexes. No cranial nerve deficit or sensory deficit.   Skin: Skin is warm and dry.   Psychiatric: She has a normal mood and affect. Her speech is normal and behavior is normal. Judgment and thought content normal.         ED Course   Procedures  Labs Reviewed   CBC W/ AUTO DIFFERENTIAL - Abnormal; Notable for the following components:       Result Value    Gran # (ANC) 1.6 (*)     Gran% 29.2 (*)     Lymph% 55.1 (*)     All other components within normal limits   PROTIME-INR   COMPREHENSIVE METABOLIC PANEL   B-TYPE  NATRIURETIC PEPTIDE   TROPONIN I          Imaging Results          X-Ray Chest AP Portable (In process)                             the patient's case was discussed in detail with the hospitalist the patient admitted                      Clinical Impression:       ICD-10-CM ICD-9-CM   1. Chest pain R07.9 786.50                             Yeni Edwards MD  11/12/19 0108

## 2019-11-11 NOTE — NURSING
Discharge orders given to pt; piv removed intact and cardiac monitor removed; pt dressed and left via wheelchair to family members vehicle without incident

## 2019-11-11 NOTE — PROGRESS NOTES
@  08:08      PATIENT INJECTED WITH      10.5mCi Tc99m MYOVIEW IV FOR RESTING IMAGES.    REMAIN NPO STATUS.                     S.C.,IVANIAMT

## 2019-11-11 NOTE — HPI
"59 year old with history of diabetes mellitus and hypertension presented to the ED with chest pain.    Location: Mid chest  Quality:  Aching  Radiation:  Neck and back  Onset:  Abrupt   Aggravating factors: None  Alleviating factors: None  Timing: Multiple brief episodes, lasting- 1-2 minutes, starting while at rest, PTA, last night, about 20 episodes in the past 2 hours  Severity: "Intense"  Associated symptoms: Burping/indigestion with no nausea/vomiting, or diaphoresis  Co morbidities:  Hypertension, diabetes mellitus (A1C 7.4 on 10/28/2019)  Other factors:  Denies tobacco, alcohol, or recreational drugs  Family history: Mother diagnosed with CAD in her 60s-60s. She had CABG. Father had a pacemaker. A brother was diagnosed with CAD at age 58. Another brother was diagnosed with CAD at age 55. Another brother was diagnosed with CAD at age 35 - he also had recreational drug use.  Prior diagnostic studies: Per chart review, Negative nuclear stress test 7/2016    Denies fever, chills, cough, shortness of breath, abdominal pain, nausea, vomiting, urinary symptoms.  Denies dyspnea on exertion, edema, weight gain, orthopnea, PND.    In the ED  Vital signs: Stable, afebrile, /71  Labs: Unremarkable  Troponin < 0.030  EKG, personally reviewed, sinus rhythm with PVCs, no ST elevation  CXR, personally reviewed, no obvious infiltrates or overt heart failure        "

## 2019-11-11 NOTE — SUBJECTIVE & OBJECTIVE
Past Medical History:   Diagnosis Date    Diabetes mellitus     Hypertension     Kidney stones     Prediabetes        Past Surgical History:   Procedure Laterality Date    FOOT SURGERY Left 2015    bunion     TONSILLECTOMY, ADENOIDECTOMY         Review of patient's allergies indicates:   Allergen Reactions    Codeine Tinitus    Morphine        No current facility-administered medications on file prior to encounter.      Current Outpatient Medications on File Prior to Encounter   Medication Sig    metFORMIN (GLUCOPHAGE) 1000 MG tablet Take 1 tablet (1,000 mg total) by mouth daily with breakfast.    telmisartan (MICARDIS) 20 MG Tab Take 1 tablet (20 mg total) by mouth once daily.     Family History     Problem Relation (Age of Onset)    Breast cancer Maternal Aunt    Diabetes Father, Mother    Heart disease Father, Mother        Tobacco Use    Smoking status: Never Smoker    Smokeless tobacco: Never Used   Substance and Sexual Activity    Alcohol use: No    Drug use: Never    Sexual activity: Yes     Partners: Male     Review of Systems   All other systems reviewed and are negative.    Objective:     Vital Signs (Most Recent):  Temp: 97.7 °F (36.5 °C) (11/10/19 2312)  Pulse: 72 (11/10/19 2312)  Resp: 16 (11/10/19 2312)  BP: (!) 162/71 (11/10/19 2312)  SpO2: 98 % (11/10/19 2312) Vital Signs (24h Range):  Temp:  [97.7 °F (36.5 °C)] 97.7 °F (36.5 °C)  Pulse:  [72] 72  Resp:  [16] 16  SpO2:  [98 %] 98 %  BP: (162)/(71) 162/71     Weight: 90.7 kg (200 lb)  Body mass index is 30.86 kg/m².    Physical Exam   Constitutional: She is oriented to person, place, and time. She appears well-developed and well-nourished.   HENT:   Head: Normocephalic and atraumatic.   Eyes: Conjunctivae and EOM are normal. Right eye exhibits no discharge. Left eye exhibits no discharge. No scleral icterus.   Neck: Normal range of motion. Neck supple. No JVD present.   Cardiovascular: Normal rate and regular rhythm. Exam reveals no  gallop and no friction rub.   No murmur heard.  Pulmonary/Chest: Effort normal. She has no wheezes. She has no rales. She exhibits no tenderness.   Abdominal: Soft.   Genitourinary: Vagina normal.   Musculoskeletal: Normal range of motion. She exhibits no edema or deformity.   Neurological: She is alert and oriented to person, place, and time.   Skin: Skin is warm and dry. Capillary refill takes less than 2 seconds. No erythema. No pallor.   Psychiatric: She has a normal mood and affect.   Vitals reviewed.        CRANIAL NERVES     CN III, IV, VI   Extraocular motions are normal.        Significant Labs:   CBC:   Recent Labs   Lab 11/10/19  2330   WBC 5.54   HGB 13.6   HCT 40.9        CMP:   Recent Labs   Lab 11/10/19  2330      K 3.8      CO2 25   *   BUN 19   CREATININE 0.9   CALCIUM 9.3   PROT 6.9   ALBUMIN 4.0   BILITOT 0.8   ALKPHOS 46*   AST 20   ALT 28   ANIONGAP 10   EGFRNONAA >60.0     Cardiac Markers:   Recent Labs   Lab 11/10/19  2330   BNP 32     Troponin:   Recent Labs   Lab 11/10/19  2330   TROPONINI <0.030     All pertinent labs within the past 24 hours have been reviewed.    Significant Imaging: I have reviewed all pertinent imaging results/findings within the past 24 hours.

## 2019-11-11 NOTE — PROGRESS NOTES
@ 08:56     PATIENT INJECTED WITH      26.9mCi Tc99m MYOVIEW FOR STRESS IMAGES.      TREADMILL STRESS    RELEASE NPO STATUS FROM NUCLEAR MEDICINE.           S.C., IVANIAMT

## 2019-11-11 NOTE — ASSESSMENT & PLAN NOTE
Hemoglobin A1c 7.4, 2 weeks ago  Monitor blood glucose  Sliding scale insulin low-dose  Hold metformin for possible cardiac workup

## 2019-11-12 NOTE — DISCHARGE SUMMARY
"[facility] Medicine  Discharge Summary      Patient Name: Nubia Flaherty  MRN: [id number]  Admission Date: 11/10/2019  Hospital Length of Stay: 0 days  Discharge Date and Time: 11/11/2019  3:24 PM  Attending Physician: No att. providers found   Discharging Provider: Lula Hanna MD  Primary Care Provider: Robert Upton PA-C      HPI:   59 year old with history of diabetes mellitus and hypertension presented to the ED with chest pain.    Location: Mid chest  Quality:  Aching  Radiation:  Neck and back  Onset:  Abrupt   Aggravating factors: None  Alleviating factors: None  Timing: Multiple brief episodes, lasting- 1-2 minutes, starting while at rest, PTA, last night, about 20 episodes in the past 2 hours  Severity: "Intense"  Associated symptoms: Burping/indigestion with no nausea/vomiting, or diaphoresis  Co morbidities:  Hypertension, diabetes mellitus (A1C 7.4 on 10/28/2019)  Other factors:  Denies tobacco, alcohol, or recreational drugs  Family history: Mother diagnosed with CAD in her 60s-60s. She had CABG. Father had a pacemaker. A brother was diagnosed with CAD at age 58. Another brother was diagnosed with CAD at age 55. Another brother was diagnosed with CAD at age 35 - he also had recreational drug use.  Prior diagnostic studies: Per chart review, Negative nuclear stress test 7/2016    Denies fever, chills, cough, shortness of breath, abdominal pain, nausea, vomiting, urinary symptoms.  Denies dyspnea on exertion, edema, weight gain, orthopnea, PND.    In the ED  Vital signs: Stable, afebrile, /71  Labs: Unremarkable  Troponin < 0.030  EKG, personally reviewed, sinus rhythm with PVCs, no ST elevation  CXR, personally reviewed, no obvious infiltrates or overt heart failure          * No surgery found *      Hospital Course:   Patient admitted with chest pain. Ischemic workup with serial Ilene and stress test has been negative.  Rx for empiric trial of PPI prescribed with " recommendation to follow up with GI if persists.  Return precautions given.   Patient examined on day of discharge and RRR, No tachypnea  Consults:     No new Assessment & Plan notes have been filed under this hospital service since the last note was generated.  Service: Hospital Medicine    Final Active Diagnoses:    Diagnosis Date Noted POA    Diabetes mellitus [E11.9] 10/28/2019 Yes    Essential hypertension [I10] 10/18/2016 Yes      Problems Resolved During this Admission:    Diagnosis Date Noted Date Resolved POA    PRINCIPAL PROBLEM:  Chest pain [R07.9] 11/11/2019 11/11/2019 Yes       Discharged Condition: good    Disposition: Home or Self Care    Follow Up:  Follow-up Information     Robert Upton PA-C In 2 weeks.    Specialty:  Family Medicine  Contact information:  [facility] [facility]  [address]  [facility] [address] [phone]                 Patient Instructions:      Diet diabetic     Notify your health care provider if you experience any of the following:  temperature >100.4     Notify your health care provider if you experience any of the following:  persistent nausea and vomiting or diarrhea     Notify your health care provider if you experience any of the following:  severe uncontrolled pain     Notify your health care provider if you experience any of the following:  difficulty breathing or increased cough     Activity as tolerated       Significant Diagnostic Studies: Labs:   CMP   Recent Labs   Lab 11/10/19  2330      K 3.8      CO2 25   *   BUN 19   CREATININE 0.9   CALCIUM 9.3   PROT 6.9   ALBUMIN 4.0   BILITOT 0.8   ALKPHOS 46*   AST 20   ALT 28   ANIONGAP 10   ESTGFRAFRICA >60.0   EGFRNONAA >60.0    and CBC   Recent Labs   Lab 11/10/19  2330   WBC 5.54   HGB 13.6   HCT 40.9          Pending Diagnostic Studies:     Procedure Component Value Units Date/Time    EKG 12-LEAD [[id number]]     Order Status:  Sent Lab Status:  No result          Medications:  Reconciled  Home Medications:      Medication List      START taking these medications    pantoprazole 40 MG tablet  Commonly known as:  PROTONIX  Take 1 tablet (40 mg total) by mouth once daily.        CHANGE how you take these medications    metFORMIN 1000 MG tablet  Commonly known as:  GLUCOPHAGE  Take 1 tablet (1,000 mg total) by mouth daily with breakfast.  What changed:  when to take this        CONTINUE taking these medications    eletriptan 20 MG tablet  Commonly known as:  RELPAX  Take 20 mg by mouth as needed for Migraine. may repeat in 2 hours if necessary     telmisartan 20 MG Tab  Commonly known as:  MICARDIS  Take 1 tablet (20 mg total) by mouth once daily.            Indwelling Lines/Drains at time of discharge:   Lines/Drains/Airways     None                 Time spent on the discharge of patient: 28 minutes  Patient was seen and examined on the date of discharge and determined to be suitable for discharge.         Lula Hanna MD  Department of Hospital Medicine  [facility]

## 2019-11-12 NOTE — HOSPITAL COURSE
Patient admitted with chest pain. Ischemic workup with serial Ilene and stress test has been negative.  Rx for empiric trial of PPI prescribed with recommendation to follow up with GI if persists.  Return precautions given.

## 2019-11-15 ENCOUNTER — OFFICE VISIT (OUTPATIENT)
Dept: FAMILY MEDICINE | Facility: CLINIC | Age: 59
End: 2019-11-15
Payer: COMMERCIAL

## 2019-11-15 VITALS
HEART RATE: 80 BPM | BODY MASS INDEX: 32.74 KG/M2 | WEIGHT: 208.63 LBS | HEIGHT: 67 IN | DIASTOLIC BLOOD PRESSURE: 80 MMHG | SYSTOLIC BLOOD PRESSURE: 122 MMHG

## 2019-11-15 DIAGNOSIS — E11.9 TYPE 2 DIABETES MELLITUS WITHOUT COMPLICATION, WITHOUT LONG-TERM CURRENT USE OF INSULIN: ICD-10-CM

## 2019-11-15 DIAGNOSIS — Z09 HOSPITAL DISCHARGE FOLLOW-UP: ICD-10-CM

## 2019-11-15 DIAGNOSIS — R07.89 ATYPICAL CHEST PAIN: Primary | ICD-10-CM

## 2019-11-15 DIAGNOSIS — K21.9 GASTROESOPHAGEAL REFLUX DISEASE, ESOPHAGITIS PRESENCE NOT SPECIFIED: ICD-10-CM

## 2019-11-15 PROCEDURE — 3074F SYST BP LT 130 MM HG: CPT | Mod: S$GLB,,, | Performed by: PHYSICIAN ASSISTANT

## 2019-11-15 PROCEDURE — 3008F PR BODY MASS INDEX (BMI) DOCUMENTED: ICD-10-PCS | Mod: S$GLB,,, | Performed by: PHYSICIAN ASSISTANT

## 2019-11-15 PROCEDURE — 99214 PR OFFICE/OUTPT VISIT, EST, LEVL IV, 30-39 MIN: ICD-10-PCS | Mod: S$GLB,,, | Performed by: PHYSICIAN ASSISTANT

## 2019-11-15 PROCEDURE — 3079F DIAST BP 80-89 MM HG: CPT | Mod: S$GLB,,, | Performed by: PHYSICIAN ASSISTANT

## 2019-11-15 PROCEDURE — 99214 OFFICE O/P EST MOD 30 MIN: CPT | Mod: S$GLB,,, | Performed by: PHYSICIAN ASSISTANT

## 2019-11-15 PROCEDURE — 3074F PR MOST RECENT SYSTOLIC BLOOD PRESSURE < 130 MM HG: ICD-10-PCS | Mod: S$GLB,,, | Performed by: PHYSICIAN ASSISTANT

## 2019-11-15 PROCEDURE — 3079F PR MOST RECENT DIASTOLIC BLOOD PRESSURE 80-89 MM HG: ICD-10-PCS | Mod: S$GLB,,, | Performed by: PHYSICIAN ASSISTANT

## 2019-11-15 PROCEDURE — 3008F BODY MASS INDEX DOCD: CPT | Mod: S$GLB,,, | Performed by: PHYSICIAN ASSISTANT

## 2019-11-15 NOTE — PATIENT INSTRUCTIONS
Noncardiac Chest Pain    Based on your visit today, the healthcare provider doesnt know what is causing your chest pain. In most cases, people who come to the emergency department with chest pain dont have a problem with their heart. Instead, the pain is caused by other conditions. It's important for the healthcare team to be sure you are not having a life threatening cause for chest pain such as a heart attack, blood clot in the lungs, collapsed lung, ruptured esophagus, or tearing of the aorta. Once these major causes have been ruled out, you may have further evaluation for non-heart causes of chest pain. These may be problems with the lungs, muscles, bones, digestive tract, nerves, or mental health.  Lung problems  · Inflammation around the lungs (pleurisy)  · Collapsed lung (pneumothorax)  · Fluid around the lungs (pleural effusion)  · Lung cancer (a rare cause of chest pain)  Muscle or bone problems  · Inflamed cartilage between the ribs (costochondritis)  · Fibromyalgia  · Rheumatoid arthritis  · Chest wall strain  Digestive system problems  · Reflux  · Stomach ulcer  · Spasms of the esophagus  · Gall stones  · Gallbladder inflammation  Mental health conditions  · Panic or anxiety attacks  · Emotional distress  Your condition doesnt seem serious and your pain doesnt appear to be coming from your heart. But sometimes the signs of a serious problem take more time to appear. Watch for the warning signs listed below.  Home care  Follow these guidelines when caring for yourself at home:  · Rest today and avoid strenuous activity.  · Take any prescribed medicine as directed.  Follow-up care  Follow up with your healthcare provider, or as advised, if you dont start to feel better within 24 hours.  When to seek medical advice  Call your healthcare provider right away if any of these occur:  · A change in the type of pain. Call if it feels different, becomes more serious, lasts longer, or begins to spread into  your shoulder, arm, neck, jaw, or back.  · Shortness of breath  · You feel more pain when you breathe  · Cough with dark-colored mucus or blood  · Weakness, dizziness, or fainting  · Fever of 100.4ºF (38ºC) or higher, or as directed by your healthcare provider  · Swelling, pain, or redness in one leg  Date Last Reviewed: 12/1/2016  © 5981-9912 [employer]. [address] [place] [address], [place] [address]. All rights reserved. This information is not intended as a substitute for professional medical care. Always follow your healthcare professional's instructions.

## 2019-11-15 NOTE — PROGRESS NOTES
SUBJECTIVE:    Patient ID: Nubia Flaherty is a 59 y.o. female.    Chief Complaint: Hospital Follow Up (mlr)    This is a 59-year-old white female who presents for hospital follow-up.  She presented to the ER with acute onset chest pain and given that she had diabetes and a strong family history of coronary artery disease, she wanted this worked up.  She had a completely negative cardiac workup in the hospital.  EKG, cardiac enzymes, Lexiscan stress test was all negative.  Cardiology ultimately cleared her for discharge. Protonix was added due to suspicion for reflux as the cause of her chest pain. She was to follow up with her gastroenterologist.  Today she reports she feels completely back to normal. She remembers having had EGD roughly 5 years back with Dr. Garcia that was essentially normal.      Admission on 11/10/2019, Discharged on 11/11/2019   Component Date Value Ref Range Status    WBC 11/10/2019 5.54  3.90 - 12.70 K/uL Final    RBC 11/10/2019 4.54  4.00 - 5.40 M/uL Final    Hemoglobin 11/10/2019 13.6  12.0 - 16.0 g/dL Final    Hematocrit 11/10/2019 40.9  37.0 - 48.5 % Final    Mean Corpuscular Volume 11/10/2019 90  82 - 98 fL Final    Mean Corpuscular Hemoglobin 11/10/2019 30.0  27.0 - 31.0 pg Final    Mean Corpuscular Hemoglobin Conc 11/10/2019 33.3  32.0 - 36.0 g/dL Final    RDW 11/10/2019 13.6  11.5 - 14.5 % Final    Platelets 11/10/2019 249  150 - 350 K/uL Final    MPV 11/10/2019 10.1  9.2 - 12.9 fL Final    Immature Granulocytes 11/10/2019 0.2  0.0 - 0.5 % Final    Gran # (ANC) 11/10/2019 1.6* 1.8 - 7.7 K/uL Final    Immature Grans (Abs) 11/10/2019 0.01  0.00 - 0.04 K/uL Final    Lymph # 11/10/2019 3.1  1.0 - 4.8 K/uL Final    Mono # 11/10/2019 0.6  0.3 - 1.0 K/uL Final    Eos # 11/10/2019 0.3  0.0 - 0.5 K/uL Final    Baso # 11/10/2019 0.05  0.00 - 0.20 K/uL Final    nRBC 11/10/2019 0  0 /100 WBC Final    Gran% 11/10/2019 29.2* 38.0 - 73.0 % Final    Lymph% 11/10/2019 55.1* 18.0  - 48.0 % Final    Mono% 11/10/2019 10.1  4.0 - 15.0 % Final    Eosinophil% 11/10/2019 4.5  0.0 - 8.0 % Final    Basophil% 11/10/2019 0.9  0.0 - 1.9 % Final    Differential Method 11/10/2019 Automated   Final    Sodium 11/10/2019 139  136 - 145 mmol/L Final    Potassium 11/10/2019 3.8  3.5 - 5.1 mmol/L Final    Chloride 11/10/2019 104  95 - 110 mmol/L Final    CO2 11/10/2019 25  23 - 29 mmol/L Final    Glucose 11/10/2019 171* 70 - 110 mg/dL Final    BUN, Bld 11/10/2019 19  8 - 23 mg/dL Final    Creatinine 11/10/2019 0.9  0.5 - 1.4 mg/dL Final    Calcium 11/10/2019 9.3  8.7 - 10.5 mg/dL Final    Total Protein 11/10/2019 6.9  6.0 - 8.4 g/dL Final    Albumin 11/10/2019 4.0  3.5 - 5.2 g/dL Final    Total Bilirubin 11/10/2019 0.8  0.1 - 1.0 mg/dL Final    Alkaline Phosphatase 11/10/2019 46* 55 - 135 U/L Final    AST 11/10/2019 20  10 - 40 U/L Final    ALT 11/10/2019 28  10 - 44 U/L Final    Anion Gap 11/10/2019 10  8 - 16 mmol/L Final    eGFR if African American 11/10/2019 >60.0  >60 mL/min/1.73 m^2 Final    eGFR if non African American 11/10/2019 >60.0  >60 mL/min/1.73 m^2 Final    BNP 11/10/2019 32  0 - 99 pg/mL Final    Troponin I 11/10/2019 <0.030  0.020 - 0.040 ng/mL Final    PT 11/10/2019 11.8  10.6 - 14.8 sec Final    INR 11/10/2019 0.9   Final    Troponin I 11/11/2019 <0.030  0.020 - 0.040 ng/mL Final    Target HR 11/11/2019 135  bpm Final    Exercise duration (min) 11/11/2019 5  minutes Final    Exercise duration (sec) 11/11/2019 0  seconds Final    HR at rest 11/11/2019 79.0  bpm Final    Systolic blood pressure 11/11/2019 136.0  mmHg Final    Diastolic blood pressure 11/11/2019 90.0  mmHg Final    RPP 11/11/2019 10,744   Final    Peak HR 11/11/2019 150.0  bpm Final    Peak Systolic BP 11/11/2019 174.0  mmHg Final    Peak Diatolic BP 11/11/2019 67.0  mmHg Final    Peak RPP 11/11/2019 26,100   Final    85% Max Predicted HR 11/11/2019 129   Final    % Max HR Achieved  11/11/2019 98   Final    Max Predicted HR 11/11/2019 152   Final    OHS CV CPX PATIENT IS MALE 11/11/2019 0   Final    OHS CV CPX PATIENT IS FEMALE 11/11/2019 1   Final    Troponin I 11/11/2019 <0.030  0.020 - 0.040 ng/mL Final    POC Glucose 11/11/2019 184* 70 - 110 Final   Office Visit on 10/28/2019   Component Date Value Ref Range Status    Hemoglobin A1C 10/28/2019 7.4  % Final       Past Medical History:   Diagnosis Date    Diabetes mellitus     Hypertension     Kidney stones     Prediabetes     Retina disorder     left eye cysts     Past Surgical History:   Procedure Laterality Date    FOOT SURGERY Left 2015    bunion     laryngocele  06/2009    TONSILLECTOMY, ADENOIDECTOMY       Family History   Problem Relation Age of Onset    Heart disease Father     Diabetes Father     Heart disease Mother     Diabetes Mother     Breast cancer Maternal Aunt     Heart disease Brother     Heart disease Brother     Drug abuse Brother     Ovarian cancer Neg Hx        Marital Status:   Alcohol History:  reports that she does not drink alcohol.  Tobacco History:  reports that she has never smoked. She has never used smokeless tobacco.  Drug History:  reports that she does not use drugs.    Review of patient's allergies indicates:   Allergen Reactions    Codeine Tinitus    Morphine        Current Outpatient Medications:     eletriptan (RELPAX) 20 MG tablet, Take 20 mg by mouth as needed for Migraine. may repeat in 2 hours if necessary, Disp: , Rfl:     metFORMIN (GLUCOPHAGE) 1000 MG tablet, Take 1 tablet (1,000 mg total) by mouth daily with breakfast. (Patient taking differently: Take 1,000 mg by mouth once daily. ), Disp: 90 tablet, Rfl: 0    telmisartan (MICARDIS) 20 MG Tab, Take 1 tablet (20 mg total) by mouth once daily. (Patient taking differently: Take 20 mg by mouth once daily. ), Disp: 90 tablet, Rfl: 1    pantoprazole (PROTONIX) 40 MG tablet, Take 1 tablet (40 mg total) by mouth once  "daily. (Patient not taking: Reported on 11/15/2019), Disp: 30 tablet, Rfl: 1    Review of Systems   Constitutional: Negative for appetite change, chills, fatigue, fever and unexpected weight change.   HENT: Negative for congestion.    Respiratory: Negative for cough, chest tightness and shortness of breath.    Cardiovascular: Negative for chest pain and palpitations.   Gastrointestinal: Negative for abdominal distention and abdominal pain.   Endocrine: Negative for cold intolerance and heat intolerance.   Genitourinary: Negative for difficulty urinating and dysuria.   Musculoskeletal: Negative for arthralgias and back pain.   Neurological: Negative for dizziness, weakness and headaches.          Objective:      Vitals:    11/15/19 0712   BP: 122/80   Pulse: 80   Weight: 94.6 kg (208 lb 9.6 oz)   Height: 5' 7" (1.702 m)     Physical Exam   Constitutional: She is oriented to person, place, and time. She appears well-developed and well-nourished. No distress.   HENT:   Head: Normocephalic and atraumatic.   Eyes: Pupils are equal, round, and reactive to light. Conjunctivae and EOM are normal.   Neck: Normal range of motion. Neck supple. No thyromegaly present.   Cardiovascular: Normal rate, regular rhythm, normal heart sounds and intact distal pulses.   Pulmonary/Chest: Effort normal and breath sounds normal.   Abdominal: Soft. Bowel sounds are normal. She exhibits no distension. There is no tenderness.   Musculoskeletal: Normal range of motion.   Neurological: She is alert and oriented to person, place, and time. No cranial nerve deficit.   Skin: Skin is warm and dry. No erythema.   Psychiatric: She has a normal mood and affect.         Assessment:       1. Atypical chest pain    2. Type 2 diabetes mellitus without complication, without long-term current use of insulin    3. Hospital discharge follow-up    4. Gastroesophageal reflux disease, esophagitis presence not specified         Plan:       Atypical chest " pain  Comments:  EKG, chest x-ray, cardiac enzymes, and Lexiscan stress test were all negative in the hospital.  Chest pain felt to be atypical and likely GI related.    Type 2 diabetes mellitus without complication, without long-term current use of insulin  Comments:  Her A1c was 7.4% at her visit a couple weeks ago.  I intensified the metformin dosage at that time    Hospital discharge follow-up  Comments:  Her meds have been reconciled and hospital discharge summary reviewed.    Gastroesophageal reflux disease, esophagitis presence not specified  Comments:  She has been referred back to her gastroenterologist Dr. hernandez.  She is hesitant to start Protonix until talking to her gastroenterologist      Follow up if symptoms worsen or fail to improve, for as scheduled.        11/15/2019 Robert Suarez PA-C

## 2019-11-18 ENCOUNTER — OFFICE VISIT (OUTPATIENT)
Dept: OPHTHALMOLOGY | Facility: CLINIC | Age: 59
End: 2019-11-18
Payer: COMMERCIAL

## 2019-11-18 DIAGNOSIS — H25.13 NS (NUCLEAR SCLEROSIS), BILATERAL: ICD-10-CM

## 2019-11-18 DIAGNOSIS — H43.822 VITREOMACULAR ADHESION, LEFT: Primary | ICD-10-CM

## 2019-11-18 DIAGNOSIS — H43.811 POSTERIOR VITREOUS DETACHMENT, RIGHT: ICD-10-CM

## 2019-11-18 PROCEDURE — 92004 PR EYE EXAM, NEW PATIENT,COMPREHESV: ICD-10-PCS | Mod: S$GLB,,, | Performed by: OPHTHALMOLOGY

## 2019-11-18 PROCEDURE — 92134 CPTRZ OPH DX IMG PST SGM RTA: CPT | Mod: S$GLB,,, | Performed by: OPHTHALMOLOGY

## 2019-11-18 PROCEDURE — 92134 POSTERIOR SEGMENT OCT RETINA (OCULAR COHERENCE TOMOGRAPHY)-BOTH EYES: ICD-10-PCS | Mod: S$GLB,,, | Performed by: OPHTHALMOLOGY

## 2019-11-18 PROCEDURE — 92004 COMPRE OPH EXAM NEW PT 1/>: CPT | Mod: S$GLB,,, | Performed by: OPHTHALMOLOGY

## 2019-11-18 PROCEDURE — 99999 PR PBB SHADOW E&M-EST. PATIENT-LVL II: ICD-10-PCS | Mod: PBBFAC,,, | Performed by: OPHTHALMOLOGY

## 2019-11-18 PROCEDURE — 92225 PR SPECIAL EYE EXAM, INITIAL: CPT | Mod: LT,S$GLB,, | Performed by: OPHTHALMOLOGY

## 2019-11-18 PROCEDURE — 99999 PR PBB SHADOW E&M-EST. PATIENT-LVL II: CPT | Mod: PBBFAC,,, | Performed by: OPHTHALMOLOGY

## 2019-11-18 PROCEDURE — 92225 PR SPECIAL EYE EXAM, INITIAL: ICD-10-PCS | Mod: LT,S$GLB,, | Performed by: OPHTHALMOLOGY

## 2019-11-18 NOTE — PROGRESS NOTES
HPI     Pt was told she has something going on behind her OS. No flashes no   floaters some blurred vision.     Last edited by Ava Marie on 11/18/2019  7:11 AM. (History)        OCT - OD - No ME  OS - VMT      A/P    1. VMT OS  ASx  No MMP per patient  Minimal outer retinal distortion    Prior episodes of heme with VR traction OD, no laser required per pt.    2. PVD OD  No breaks or tears    3. Early NS OU      4 months OCT

## 2019-11-18 NOTE — LETTER
November 18, 2019      Lele Blake MD  [address] [facility] [address]           [facility]  [address] [place] [address]  Phone: [phone]  Fax: [phone]          Patient: Nubia Flaherty   MR Number: [id number]   YOB: 1960   Date of Visit: 11/18/2019       Dear Dr. eLle Blake:    Thank you for referring Nubia Flaherty to me for evaluation. Attached you will find relevant portions of my assessment and plan of care.    If you have questions, please do not hesitate to call me. I look forward to following Nubia Flaherty along with you.    Sincerely,    DIPAK Wylie MD    Enclosure  CC:  No Recipients    If you would like to receive this communication electronically, please contact [email or web address] or [phone] to request more information on [facility] Link access.    For providers and/or their staff who would like to refer a patient to [facility], please contact us through our one-stop-shop provider referral line, [facility], at [phone].    If you feel you have received this communication in error or would no longer like to receive these types of communications, please e-mail [email or web address]

## 2019-12-03 DIAGNOSIS — R10.13 ABDOMINAL PAIN, EPIGASTRIC: Primary | ICD-10-CM

## 2019-12-10 ENCOUNTER — HOSPITAL ENCOUNTER (OUTPATIENT)
Dept: RADIOLOGY | Facility: HOSPITAL | Age: 59
Discharge: HOME | End: 2019-12-10
Attending: INTERNAL MEDICINE
Payer: COMMERCIAL

## 2019-12-10 DIAGNOSIS — R10.13 ABDOMINAL PAIN, EPIGASTRIC: ICD-10-CM

## 2019-12-10 PROCEDURE — 76700 US EXAM ABDOM COMPLETE: CPT | Mod: TC,PO

## 2020-01-27 ENCOUNTER — OFFICE VISIT (OUTPATIENT)
Dept: FAMILY MEDICINE | Facility: CLINIC | Age: 60
End: 2020-01-27
Payer: COMMERCIAL

## 2020-01-27 VITALS
SYSTOLIC BLOOD PRESSURE: 108 MMHG | DIASTOLIC BLOOD PRESSURE: 72 MMHG | WEIGHT: 212.81 LBS | HEIGHT: 67 IN | HEART RATE: 96 BPM | BODY MASS INDEX: 33.4 KG/M2

## 2020-01-27 DIAGNOSIS — E11.9 TYPE 2 DIABETES MELLITUS WITHOUT COMPLICATION, WITHOUT LONG-TERM CURRENT USE OF INSULIN: ICD-10-CM

## 2020-01-27 DIAGNOSIS — I10 ESSENTIAL HYPERTENSION: ICD-10-CM

## 2020-01-27 PROCEDURE — 99214 OFFICE O/P EST MOD 30 MIN: CPT | Mod: S$GLB,,, | Performed by: PHYSICIAN ASSISTANT

## 2020-01-27 PROCEDURE — 3078F DIAST BP <80 MM HG: CPT | Mod: S$GLB,,, | Performed by: PHYSICIAN ASSISTANT

## 2020-01-27 PROCEDURE — 3008F PR BODY MASS INDEX (BMI) DOCUMENTED: ICD-10-PCS | Mod: S$GLB,,, | Performed by: PHYSICIAN ASSISTANT

## 2020-01-27 PROCEDURE — 3008F BODY MASS INDEX DOCD: CPT | Mod: S$GLB,,, | Performed by: PHYSICIAN ASSISTANT

## 2020-01-27 PROCEDURE — 99214 PR OFFICE/OUTPT VISIT, EST, LEVL IV, 30-39 MIN: ICD-10-PCS | Mod: S$GLB,,, | Performed by: PHYSICIAN ASSISTANT

## 2020-01-27 PROCEDURE — 3074F PR MOST RECENT SYSTOLIC BLOOD PRESSURE < 130 MM HG: ICD-10-PCS | Mod: S$GLB,,, | Performed by: PHYSICIAN ASSISTANT

## 2020-01-27 PROCEDURE — 3078F PR MOST RECENT DIASTOLIC BLOOD PRESSURE < 80 MM HG: ICD-10-PCS | Mod: S$GLB,,, | Performed by: PHYSICIAN ASSISTANT

## 2020-01-27 PROCEDURE — 3074F SYST BP LT 130 MM HG: CPT | Mod: S$GLB,,, | Performed by: PHYSICIAN ASSISTANT

## 2020-01-27 RX ORDER — METFORMIN HYDROCHLORIDE 1000 MG/1
1000 TABLET ORAL
Qty: 90 TABLET | Refills: 1 | Status: SHIPPED | OUTPATIENT
Start: 2020-01-27 | End: 2020-06-02 | Stop reason: SDUPTHER

## 2020-01-27 RX ORDER — TELMISARTAN 20 MG/1
20 TABLET ORAL DAILY
Qty: 90 TABLET | Refills: 1 | Status: SHIPPED | OUTPATIENT
Start: 2020-01-27 | End: 2020-10-13

## 2020-01-27 NOTE — PATIENT INSTRUCTIONS
Diet: Diabetes  Food is an important tool that you can use to control diabetes and stay healthy. Eating well-balanced meals in the correct amounts will help you control your blood glucose levels and prevent low blood sugar reactions. It will also help you reduce the health risks of diabetes. There is no one specific diet that is right for everyone with diabetes. But there are general guidelines to follow. A registered dietitian (RD) will create a tailored diet approach thats just right for you. He or she will also help you plan healthy meals and snacks. If you have any questions, call your dietitian for advice.     Guidelines for success  Talk with your healthcare provider before starting a diabetes diet or weight loss program. If you haven't talked with a dietitian yet, ask your provider for a referral. The following guidelines can help you succeed:  · Select foods from the 6 food groups below. Your dietitian will help you find food choices within each group. He or she will also show you serving sizes and how many servings you can have at each meal.  ¨ Grains, beans, and starchy vegetables  ¨ Vegetables  ¨ Fruit  ¨ Milk or yogurt  ¨ Meat, poultry, fish, or tofu  ¨ Healthy fats  · Check your blood sugar levels as directed by your provider. Take any medicine as prescribed by your provider.  · Learn to read food labels and pick the right portion sizes.  · Eat only the amount of food in your meal plan. Eat about the same amount of food at regular times each day. Dont skip meals. Eat meals 4 to 5 hours apart, with snacks in between.  · Limit alcohol. It raises blood sugar levels. Drink water or calorie-free diet drinks that use safe sweeteners.  · Eat less fat to help lower your risk of heart disease. Use nonfat or low-fat dairy products and lean meats. Avoid fried foods. Use cooking oils that are unsaturated, such as olive, canola, or peanut oil.  · Talk with your dietitian about safe sugar substitutes.  · Avoid  added salt. It can contribute to high blood pressure, which can cause heart disease. People with diabetes already have a risk of high blood pressure and heart disease.  · Stay at a healthy weight. If you need to lose weight, cut down on your portion sizes. But dont skip meals. Exercise is an important part of any weight management program. Talk with your provider about an exercise program thats right for you.  · For more information about the best diet plan for you, talk with a registered dietitian (RD). To find an RD in your area, contact:  ¨ Academy of Nutrition and Dietetics www.eatright.org  ¨ The American Diabetes Association [phone] www.diabetes.org  Date Last Reviewed: 8/1/2016 © 2000-2017 [employer]. [address] [place] [address], [place] [address]. All rights reserved. This information is not intended as a substitute for professional medical care. Always follow your healthcare professional's instructions.

## 2020-01-27 NOTE — PROGRESS NOTES
SUBJECTIVE:    Patient ID: Nubia Flaherty is a 60 y.o. female.    Chief Complaint: Hypertension (3 month follow up.....mlr) and Medication Refill (NO med bottles.....pharmacy updated.....mlr)    60-year-old white female presents today for regular three-month diabetic checkup.  Her A1c back in October was 7.4% and she is managed with metformin alone.  She is newly retired and trying to really watch her diet and increase exercise.  Her blood pressure also looks good today. She does admit that she has had some recent stressors with her parent's health. Knows that she has not been eating well and no exercise at all with caring for her parents. Does report that her cough has greatly improved with the addition of elderberry syrup.      Admission on 11/10/2019, Discharged on 11/11/2019   Component Date Value Ref Range Status    WBC 11/10/2019 5.54  3.90 - 12.70 K/uL Final    RBC 11/10/2019 4.54  4.00 - 5.40 M/uL Final    Hemoglobin 11/10/2019 13.6  12.0 - 16.0 g/dL Final    Hematocrit 11/10/2019 40.9  37.0 - 48.5 % Final    Mean Corpuscular Volume 11/10/2019 90  82 - 98 fL Final    Mean Corpuscular Hemoglobin 11/10/2019 30.0  27.0 - 31.0 pg Final    Mean Corpuscular Hemoglobin Conc 11/10/2019 33.3  32.0 - 36.0 g/dL Final    RDW 11/10/2019 13.6  11.5 - 14.5 % Final    Platelets 11/10/2019 249  150 - 350 K/uL Final    MPV 11/10/2019 10.1  9.2 - 12.9 fL Final    Immature Granulocytes 11/10/2019 0.2  0.0 - 0.5 % Final    Gran # (ANC) 11/10/2019 1.6* 1.8 - 7.7 K/uL Final    Immature Grans (Abs) 11/10/2019 0.01  0.00 - 0.04 K/uL Final    Lymph # 11/10/2019 3.1  1.0 - 4.8 K/uL Final    Mono # 11/10/2019 0.6  0.3 - 1.0 K/uL Final    Eos # 11/10/2019 0.3  0.0 - 0.5 K/uL Final    Baso # 11/10/2019 0.05  0.00 - 0.20 K/uL Final    nRBC 11/10/2019 0  0 /100 WBC Final    Gran% 11/10/2019 29.2* 38.0 - 73.0 % Final    Lymph% 11/10/2019 55.1* 18.0 - 48.0 % Final    Mono% 11/10/2019 10.1  4.0 - 15.0 % Final     Eosinophil% 11/10/2019 4.5  0.0 - 8.0 % Final    Basophil% 11/10/2019 0.9  0.0 - 1.9 % Final    Differential Method 11/10/2019 Automated   Final    Sodium 11/10/2019 139  136 - 145 mmol/L Final    Potassium 11/10/2019 3.8  3.5 - 5.1 mmol/L Final    Chloride 11/10/2019 104  95 - 110 mmol/L Final    CO2 11/10/2019 25  23 - 29 mmol/L Final    Glucose 11/10/2019 171* 70 - 110 mg/dL Final    BUN, Bld 11/10/2019 19  8 - 23 mg/dL Final    Creatinine 11/10/2019 0.9  0.5 - 1.4 mg/dL Final    Calcium 11/10/2019 9.3  8.7 - 10.5 mg/dL Final    Total Protein 11/10/2019 6.9  6.0 - 8.4 g/dL Final    Albumin 11/10/2019 4.0  3.5 - 5.2 g/dL Final    Total Bilirubin 11/10/2019 0.8  0.1 - 1.0 mg/dL Final    Alkaline Phosphatase 11/10/2019 46* 55 - 135 U/L Final    AST 11/10/2019 20  10 - 40 U/L Final    ALT 11/10/2019 28  10 - 44 U/L Final    Anion Gap 11/10/2019 10  8 - 16 mmol/L Final    eGFR if African American 11/10/2019 >60.0  >60 mL/min/1.73 m^2 Final    eGFR if non African American 11/10/2019 >60.0  >60 mL/min/1.73 m^2 Final    BNP 11/10/2019 32  0 - 99 pg/mL Final    Troponin I 11/10/2019 <0.030  0.020 - 0.040 ng/mL Final    PT 11/10/2019 11.8  10.6 - 14.8 sec Final    INR 11/10/2019 0.9   Final    Troponin I 11/11/2019 <0.030  0.020 - 0.040 ng/mL Final    Target HR 11/11/2019 135  bpm Final    Exercise duration (min) 11/11/2019 5  minutes Final    Exercise duration (sec) 11/11/2019 0  seconds Final    HR at rest 11/11/2019 79.0  bpm Final    Systolic blood pressure 11/11/2019 136.0  mmHg Final    Diastolic blood pressure 11/11/2019 90.0  mmHg Final    RPP 11/11/2019 10,744   Final    Peak HR 11/11/2019 150.0  bpm Final    Peak Systolic BP 11/11/2019 174.0  mmHg Final    Peak Diatolic BP 11/11/2019 67.0  mmHg Final    Peak RPP 11/11/2019 26,100   Final    85% Max Predicted HR 11/11/2019 129   Final    % Max HR Achieved 11/11/2019 98   Final    Max Predicted HR 11/11/2019 152   Final    OHS  CV CPX PATIENT IS MALE 11/11/2019 0   Final    OHS CV CPX PATIENT IS FEMALE 11/11/2019 1   Final    Troponin I 11/11/2019 <0.030  0.020 - 0.040 ng/mL Final    POC Glucose 11/11/2019 184* 70 - 110 Final   Office Visit on 10/28/2019   Component Date Value Ref Range Status    Hemoglobin A1C 10/28/2019 7.4  % Final       Past Medical History:   Diagnosis Date    Diabetes mellitus     Hypertension     Kidney stones     Prediabetes     Retina disorder     left eye cysts     Past Surgical History:   Procedure Laterality Date    FOOT SURGERY Left 2015    bunion     laryngocele  06/2009    TONSILLECTOMY, ADENOIDECTOMY       Family History   Problem Relation Age of Onset    Heart disease Father     Diabetes Father     Heart disease Mother     Diabetes Mother     Breast cancer Maternal Aunt     Heart disease Brother     Heart disease Brother     Drug abuse Brother     Ovarian cancer Neg Hx        Marital Status:   Alcohol History:  reports that she does not drink alcohol.  Tobacco History:  reports that she has never smoked. She has never used smokeless tobacco.  Drug History:  reports that she does not use drugs.    Review of patient's allergies indicates:   Allergen Reactions    Codeine Tinitus    Morphine Other (See Comments)     Severe migrain       Current Outpatient Medications:     eletriptan (RELPAX) 40 MG tablet, Take 40 mg by mouth as needed for Migraine. may repeat in 2 hours if necessary , Disp: , Rfl:     metFORMIN (GLUCOPHAGE) 1000 MG tablet, Take 1 tablet (1,000 mg total) by mouth daily with breakfast., Disp: 90 tablet, Rfl: 1    telmisartan (MICARDIS) 20 MG Tab, Take 1 tablet (20 mg total) by mouth once daily., Disp: 90 tablet, Rfl: 1    pantoprazole (PROTONIX) 40 MG tablet, Take 1 tablet (40 mg total) by mouth once daily. (Patient not taking: Reported on 1/27/2020), Disp: 30 tablet, Rfl: 1    semaglutide (OZEMPIC) 1 mg/dose (2 mg/1.5 mL) PnIj, Inject 0.75 mLs into the skin  "every 7 days., Disp: 2 Syringe, Rfl: 5    Review of Systems   Constitutional: Negative for appetite change, chills, fatigue, fever and unexpected weight change.   HENT: Negative for congestion.    Respiratory: Negative for cough, chest tightness and shortness of breath.    Cardiovascular: Negative for chest pain and palpitations.   Gastrointestinal: Negative for abdominal distention and abdominal pain.   Endocrine: Negative for cold intolerance and heat intolerance.   Genitourinary: Negative for difficulty urinating and dysuria.   Musculoskeletal: Negative for arthralgias and back pain.   Neurological: Negative for dizziness, weakness and headaches.          Objective:      Vitals:    01/27/20 0819   BP: 108/72   Pulse: 96   Weight: 96.5 kg (212 lb 12.8 oz)   Height: 5' 7" (1.702 m)     Physical Exam   Constitutional: She is oriented to person, place, and time. She appears well-developed and well-nourished. No distress.   HENT:   Head: Normocephalic and atraumatic.   Eyes: Pupils are equal, round, and reactive to light. Conjunctivae and EOM are normal.   Neck: Normal range of motion. Neck supple. No thyromegaly present.   Cardiovascular: Normal rate, regular rhythm, normal heart sounds and intact distal pulses.   Pulses:       Dorsalis pedis pulses are 2+ on the right side, and 2+ on the left side.        Posterior tibial pulses are 2+ on the right side, and 2+ on the left side.   Pulmonary/Chest: Effort normal and breath sounds normal.   Abdominal: Soft. Bowel sounds are normal. She exhibits no distension. There is no tenderness.   Musculoskeletal: Normal range of motion.        Right foot: There is normal range of motion and no deformity.        Left foot: There is normal range of motion and no deformity.   Feet:   Right Foot:   Protective Sensation: 2 sites tested. 2 sites sensed.   Skin Integrity: Negative for ulcer or blister.   Left Foot:   Protective Sensation: 2 sites tested. 2 sites sensed.   Skin " Integrity: Negative for ulcer or blister.   Neurological: She is alert and oriented to person, place, and time. No cranial nerve deficit.   Skin: Skin is warm and dry. No erythema.   Psychiatric: She has a normal mood and affect.         Assessment:       1. Type 2 diabetes mellitus without complication, without long-term current use of insulin    2. Essential hypertension         Plan:       Type 2 diabetes mellitus without complication, without long-term current use of insulin  Comments:  A1c 8.1% and creeping higher. Pt agreeable to ozempic weekly. will send in full script to pharmacy.  Orders:  -     metFORMIN (GLUCOPHAGE) 1000 MG tablet; Take 1 tablet (1,000 mg total) by mouth daily with breakfast.  Dispense: 90 tablet; Refill: 1  -     semaglutide (OZEMPIC) 1 mg/dose (2 mg/1.5 mL) PnIj; Inject 0.75 mLs into the skin every 7 days.  Dispense: 2 Syringe; Refill: 5    Essential hypertension  Comments:  Very well controlled. Continue as is.  Orders:  -     telmisartan (MICARDIS) 20 MG Tab; Take 1 tablet (20 mg total) by mouth once daily.  Dispense: 90 tablet; Refill: 1      Follow up in about 3 months (around 4/27/2020) for Diabetic Check-Up.        1/27/2020 Robert Suarez PA-C

## 2020-02-18 ENCOUNTER — TELEPHONE (OUTPATIENT)
Dept: FAMILY MEDICINE | Facility: CLINIC | Age: 60
End: 2020-02-18

## 2020-02-18 DIAGNOSIS — E04.1 THYROID NODULE: Primary | ICD-10-CM

## 2020-02-26 ENCOUNTER — HOSPITAL ENCOUNTER (OUTPATIENT)
Dept: RADIOLOGY | Facility: HOSPITAL | Age: 60
Discharge: HOME | End: 2020-02-26
Attending: PHYSICIAN ASSISTANT
Payer: COMMERCIAL

## 2020-02-26 DIAGNOSIS — E04.1 THYROID NODULE: ICD-10-CM

## 2020-02-26 PROCEDURE — 76536 US EXAM OF HEAD AND NECK: CPT | Mod: TC,PO

## 2020-02-28 ENCOUNTER — TELEPHONE (OUTPATIENT)
Dept: FAMILY MEDICINE | Facility: CLINIC | Age: 60
End: 2020-02-28

## 2020-02-28 NOTE — TELEPHONE ENCOUNTER
----- Message from Robert Suarez PA-C sent at 2/27/2020  4:45 PM CST -----  This is an unchanged thyroid ultrasound when compared to study in 2018.  Continue as is and we will continue to monitor

## 2020-05-11 ENCOUNTER — OFFICE VISIT (OUTPATIENT)
Dept: OPHTHALMOLOGY | Facility: CLINIC | Age: 60
End: 2020-05-11
Attending: OPHTHALMOLOGY
Payer: COMMERCIAL

## 2020-05-11 VITALS — SYSTOLIC BLOOD PRESSURE: 140 MMHG | HEART RATE: 72 BPM | DIASTOLIC BLOOD PRESSURE: 85 MMHG

## 2020-05-11 DIAGNOSIS — H25.13 NS (NUCLEAR SCLEROSIS), BILATERAL: ICD-10-CM

## 2020-05-11 DIAGNOSIS — H43.822 VITREOMACULAR ADHESION, LEFT: Primary | ICD-10-CM

## 2020-05-11 DIAGNOSIS — H43.811 POSTERIOR VITREOUS DETACHMENT, RIGHT: ICD-10-CM

## 2020-05-11 PROCEDURE — 99999 PR PBB SHADOW E&M-EST. PATIENT-LVL III: ICD-10-PCS | Mod: PBBFAC,,, | Performed by: OPHTHALMOLOGY

## 2020-05-11 PROCEDURE — 92202 PR OPHTHALMOSCOPY, EXT, W/DRAW OPTIC NERVE/MACULA, I&R, UNI/BI: ICD-10-PCS | Mod: S$GLB,,, | Performed by: OPHTHALMOLOGY

## 2020-05-11 PROCEDURE — 92014 COMPRE OPH EXAM EST PT 1/>: CPT | Mod: S$GLB,,, | Performed by: OPHTHALMOLOGY

## 2020-05-11 PROCEDURE — 99999 PR PBB SHADOW E&M-EST. PATIENT-LVL III: CPT | Mod: PBBFAC,,, | Performed by: OPHTHALMOLOGY

## 2020-05-11 PROCEDURE — 92134 CPTRZ OPH DX IMG PST SGM RTA: CPT | Mod: S$GLB,,, | Performed by: OPHTHALMOLOGY

## 2020-05-11 PROCEDURE — 92134 POSTERIOR SEGMENT OCT RETINA (OCULAR COHERENCE TOMOGRAPHY)-BOTH EYES: ICD-10-PCS | Mod: S$GLB,,, | Performed by: OPHTHALMOLOGY

## 2020-05-11 PROCEDURE — 92202 OPSCPY EXTND ON/MAC DRAW: CPT | Mod: S$GLB,,, | Performed by: OPHTHALMOLOGY

## 2020-05-11 PROCEDURE — 92014 PR EYE EXAM, EST PATIENT,COMPREHESV: ICD-10-PCS | Mod: S$GLB,,, | Performed by: OPHTHALMOLOGY

## 2020-05-11 NOTE — PROGRESS NOTES
HPI     4 mo OCT   DLS- 11/18/19 Dr. Wylie    Pt sts no change in vision since last visit. Upon waking up some mornings   will have achy/ pressure pain sensation OU does not happen often.   (-)Flashes (-)Floaters  (-)Photophobia  (+)Glare driving at night  Uses yellow tinted glasses to help     No gtts          OCT - OD - No ME  OS - VMT - increase in traction, outer segments ok      A/P    1. VMT OS  ASx  No MMP per patient  outer retinal distortion improved, inner traction increased    Prior episodes of heme with VR traction OD, no laser required per pt.    2. PVD OD  No breaks or tears    3. Early NS OU      6 months OCT

## 2020-06-02 ENCOUNTER — OFFICE VISIT (OUTPATIENT)
Dept: FAMILY MEDICINE | Facility: CLINIC | Age: 60
End: 2020-06-02
Payer: COMMERCIAL

## 2020-06-02 VITALS
TEMPERATURE: 98 F | HEIGHT: 67 IN | WEIGHT: 210 LBS | DIASTOLIC BLOOD PRESSURE: 74 MMHG | BODY MASS INDEX: 32.96 KG/M2 | SYSTOLIC BLOOD PRESSURE: 118 MMHG | HEART RATE: 80 BPM

## 2020-06-02 DIAGNOSIS — E11.9 TYPE 2 DIABETES MELLITUS WITHOUT COMPLICATION, WITHOUT LONG-TERM CURRENT USE OF INSULIN: Primary | ICD-10-CM

## 2020-06-02 DIAGNOSIS — Z79.899 ENCOUNTER FOR LONG-TERM (CURRENT) USE OF OTHER MEDICATIONS: ICD-10-CM

## 2020-06-02 LAB — HBA1C MFR BLD: 8.6 %

## 2020-06-02 PROCEDURE — 3074F SYST BP LT 130 MM HG: CPT | Mod: S$GLB,,, | Performed by: PHYSICIAN ASSISTANT

## 2020-06-02 PROCEDURE — 3051F PR MOST RECENT HEMOGLOBIN A1C LEVEL 7.0 - < 8.0%: ICD-10-PCS | Mod: S$GLB,,, | Performed by: PHYSICIAN ASSISTANT

## 2020-06-02 PROCEDURE — 3008F BODY MASS INDEX DOCD: CPT | Mod: S$GLB,,, | Performed by: PHYSICIAN ASSISTANT

## 2020-06-02 PROCEDURE — 83036 HEMOGLOBIN GLYCOSYLATED A1C: CPT | Mod: QW,,, | Performed by: PHYSICIAN ASSISTANT

## 2020-06-02 PROCEDURE — 99214 OFFICE O/P EST MOD 30 MIN: CPT | Mod: S$GLB,,, | Performed by: PHYSICIAN ASSISTANT

## 2020-06-02 PROCEDURE — 3078F DIAST BP <80 MM HG: CPT | Mod: S$GLB,,, | Performed by: PHYSICIAN ASSISTANT

## 2020-06-02 PROCEDURE — 3051F HG A1C>EQUAL 7.0%<8.0%: CPT | Mod: S$GLB,,, | Performed by: PHYSICIAN ASSISTANT

## 2020-06-02 PROCEDURE — 3074F PR MOST RECENT SYSTOLIC BLOOD PRESSURE < 130 MM HG: ICD-10-PCS | Mod: S$GLB,,, | Performed by: PHYSICIAN ASSISTANT

## 2020-06-02 PROCEDURE — 3008F PR BODY MASS INDEX (BMI) DOCUMENTED: ICD-10-PCS | Mod: S$GLB,,, | Performed by: PHYSICIAN ASSISTANT

## 2020-06-02 PROCEDURE — 99214 PR OFFICE/OUTPT VISIT, EST, LEVL IV, 30-39 MIN: ICD-10-PCS | Mod: S$GLB,,, | Performed by: PHYSICIAN ASSISTANT

## 2020-06-02 PROCEDURE — 83036 POCT HEMOGLOBIN A1C: ICD-10-PCS | Mod: QW,,, | Performed by: PHYSICIAN ASSISTANT

## 2020-06-02 PROCEDURE — 3078F PR MOST RECENT DIASTOLIC BLOOD PRESSURE < 80 MM HG: ICD-10-PCS | Mod: S$GLB,,, | Performed by: PHYSICIAN ASSISTANT

## 2020-06-02 RX ORDER — METFORMIN HYDROCHLORIDE 1000 MG/1
1000 TABLET ORAL 2 TIMES DAILY WITH MEALS
Qty: 180 TABLET | Refills: 1 | Status: SHIPPED | OUTPATIENT
Start: 2020-06-02 | End: 2020-11-29

## 2020-06-02 NOTE — PROGRESS NOTES
SUBJECTIVE:    Patient ID: Nubia Flaherty is a 60 y.o. female.    Chief Complaint: Hypertension (went over meds verbally, C-scope declined, Mammo declined for now// SW) and Diabetes (SW)    This is a 60-year-old white female who presents today for regular 6 month checkup.  She is treated for hypertension, diabetes.  She reports blood pressure running great at home.  She also has a history of migraine for which she uses Relpax p.r.n..  Her A1c today is 8.6% and up from 7.4 in October.  She reports that she has been extremely busy caring for her mother since her father passed. Knows that her diet has not been the best. She has not been taking the ozempic now either. She understands the importance of getting on the weekly.      No visits with results within 6 Month(s) from this visit.   Latest known visit with results is:   Admission on 11/10/2019, Discharged on 11/11/2019   Component Date Value Ref Range Status    WBC 11/10/2019 5.54  3.90 - 12.70 K/uL Final    RBC 11/10/2019 4.54  4.00 - 5.40 M/uL Final    Hemoglobin 11/10/2019 13.6  12.0 - 16.0 g/dL Final    Hematocrit 11/10/2019 40.9  37.0 - 48.5 % Final    Mean Corpuscular Volume 11/10/2019 90  82 - 98 fL Final    Mean Corpuscular Hemoglobin 11/10/2019 30.0  27.0 - 31.0 pg Final    Mean Corpuscular Hemoglobin Conc 11/10/2019 33.3  32.0 - 36.0 g/dL Final    RDW 11/10/2019 13.6  11.5 - 14.5 % Final    Platelets 11/10/2019 249  150 - 350 K/uL Final    MPV 11/10/2019 10.1  9.2 - 12.9 fL Final    Immature Granulocytes 11/10/2019 0.2  0.0 - 0.5 % Final    Gran # (ANC) 11/10/2019 1.6* 1.8 - 7.7 K/uL Final    Immature Grans (Abs) 11/10/2019 0.01  0.00 - 0.04 K/uL Final    Lymph # 11/10/2019 3.1  1.0 - 4.8 K/uL Final    Mono # 11/10/2019 0.6  0.3 - 1.0 K/uL Final    Eos # 11/10/2019 0.3  0.0 - 0.5 K/uL Final    Baso # 11/10/2019 0.05  0.00 - 0.20 K/uL Final    nRBC 11/10/2019 0  0 /100 WBC Final    Gran% 11/10/2019 29.2* 38.0 - 73.0 % Final    Lymph%  11/10/2019 55.1* 18.0 - 48.0 % Final    Mono% 11/10/2019 10.1  4.0 - 15.0 % Final    Eosinophil% 11/10/2019 4.5  0.0 - 8.0 % Final    Basophil% 11/10/2019 0.9  0.0 - 1.9 % Final    Differential Method 11/10/2019 Automated   Final    Sodium 11/10/2019 139  136 - 145 mmol/L Final    Potassium 11/10/2019 3.8  3.5 - 5.1 mmol/L Final    Chloride 11/10/2019 104  95 - 110 mmol/L Final    CO2 11/10/2019 25  23 - 29 mmol/L Final    Glucose 11/10/2019 171* 70 - 110 mg/dL Final    BUN, Bld 11/10/2019 19  8 - 23 mg/dL Final    Creatinine 11/10/2019 0.9  0.5 - 1.4 mg/dL Final    Calcium 11/10/2019 9.3  8.7 - 10.5 mg/dL Final    Total Protein 11/10/2019 6.9  6.0 - 8.4 g/dL Final    Albumin 11/10/2019 4.0  3.5 - 5.2 g/dL Final    Total Bilirubin 11/10/2019 0.8  0.1 - 1.0 mg/dL Final    Alkaline Phosphatase 11/10/2019 46* 55 - 135 U/L Final    AST 11/10/2019 20  10 - 40 U/L Final    ALT 11/10/2019 28  10 - 44 U/L Final    Anion Gap 11/10/2019 10  8 - 16 mmol/L Final    eGFR if African American 11/10/2019 >60.0  >60 mL/min/1.73 m^2 Final    eGFR if non African American 11/10/2019 >60.0  >60 mL/min/1.73 m^2 Final    BNP 11/10/2019 32  0 - 99 pg/mL Final    Troponin I 11/10/2019 <0.030  0.020 - 0.040 ng/mL Final    PT 11/10/2019 11.8  10.6 - 14.8 sec Final    INR 11/10/2019 0.9   Final    Troponin I 11/11/2019 <0.030  0.020 - 0.040 ng/mL Final    Target HR 11/11/2019 135  bpm Final    Exercise duration (min) 11/11/2019 5  minutes Final    Exercise duration (sec) 11/11/2019 0  seconds Final    HR at rest 11/11/2019 79.0  bpm Final    Systolic blood pressure 11/11/2019 136.0  mmHg Final    Diastolic blood pressure 11/11/2019 90.0  mmHg Final    RPP 11/11/2019 10,744   Final    Peak HR 11/11/2019 150.0  bpm Final    Peak Systolic BP 11/11/2019 174.0  mmHg Final    Peak Diatolic BP 11/11/2019 67.0  mmHg Final    Peak RPP 11/11/2019 26,100   Final    85% Max Predicted HR 11/11/2019 129   Final    %  Max HR Achieved 11/11/2019 98   Final    Max Predicted HR 11/11/2019 152   Final    OHS CV CPX PATIENT IS MALE 11/11/2019 0   Final    OHS CV CPX PATIENT IS FEMALE 11/11/2019 1   Final    Troponin I 11/11/2019 <0.030  0.020 - 0.040 ng/mL Final    POC Glucose 11/11/2019 184* 70 - 110 Final       Past Medical History:   Diagnosis Date    Diabetes mellitus     Hypertension     Kidney stones     Prediabetes     Retina disorder     left eye cysts     Past Surgical History:   Procedure Laterality Date    FOOT SURGERY Left 2015    bunion     laryngocele  06/2009    TONSILLECTOMY, ADENOIDECTOMY       Family History   Problem Relation Age of Onset    Heart disease Father     Diabetes Father     Heart disease Mother     Diabetes Mother     Breast cancer Maternal Aunt     Heart disease Brother     Heart disease Brother     Drug abuse Brother     Ovarian cancer Neg Hx        Marital Status:   Alcohol History:  reports that she does not drink alcohol.  Tobacco History:  reports that she has never smoked. She has never used smokeless tobacco.  Drug History:  reports that she does not use drugs.    Review of patient's allergies indicates:   Allergen Reactions    Codeine Tinitus    Morphine Other (See Comments)     Severe migrain       Current Outpatient Medications:     eletriptan (RELPAX) 40 MG tablet, Take 40 mg by mouth as needed for Migraine. may repeat in 2 hours if necessary , Disp: , Rfl:     metFORMIN (GLUCOPHAGE) 1000 MG tablet, Take 1 tablet (1,000 mg total) by mouth 2 (two) times daily with meals., Disp: 180 tablet, Rfl: 1    semaglutide (OZEMPIC) 1 mg/dose (2 mg/1.5 mL) PnIj, Inject 0.75 mLs into the skin every 7 days., Disp: 2 Syringe, Rfl: 5    telmisartan (MICARDIS) 20 MG Tab, Take 1 tablet (20 mg total) by mouth once daily. (Patient not taking: Reported on 6/2/2020), Disp: 90 tablet, Rfl: 1    Review of Systems   Constitutional: Negative for appetite change, chills, fatigue, fever  "and unexpected weight change.   HENT: Negative for congestion.    Respiratory: Negative for cough, chest tightness and shortness of breath.    Cardiovascular: Negative for chest pain and palpitations.   Gastrointestinal: Negative for abdominal distention and abdominal pain.   Endocrine: Negative for cold intolerance and heat intolerance.   Genitourinary: Negative for difficulty urinating and dysuria.   Musculoskeletal: Negative for arthralgias and back pain.   Neurological: Negative for dizziness, weakness and headaches.          Objective:      Vitals:    06/02/20 1144   BP: 118/74   Pulse: 80   Temp: 98.4 °F (36.9 °C)   Weight: 95.3 kg (210 lb)   Height: 5' 7" (1.702 m)     Physical Exam   Constitutional: She is oriented to person, place, and time. She appears well-developed and well-nourished. No distress.   HENT:   Head: Normocephalic and atraumatic.   Cardiovascular: Normal rate and regular rhythm.   Pulmonary/Chest: Effort normal. No respiratory distress.   Abdominal: Soft. Bowel sounds are normal. She exhibits no distension.   Musculoskeletal: Normal range of motion.   Neurological: She is alert and oriented to person, place, and time.   Skin: Skin is warm and dry.   Psychiatric: She has a normal mood and affect.         Assessment:       1. Type 2 diabetes mellitus without complication, without long-term current use of insulin    2. Encounter for long-term (current) use of other medications         Plan:       Type 2 diabetes mellitus without complication, without long-term current use of insulin  Comments:  A1c has crept even higher to 8.6% Pt agrees to increase metformin to BID and start ozempic. I suspect that we will have her at goal next time.  Orders:  -     Hemoglobin A1C, POCT  -     metFORMIN (GLUCOPHAGE) 1000 MG tablet; Take 1 tablet (1,000 mg total) by mouth 2 (two) times daily with meals.  Dispense: 180 tablet; Refill: 1  -     semaglutide (OZEMPIC) 1 mg/dose (2 mg/1.5 mL) PnIj; Inject 0.75 mLs " into the skin every 7 days.  Dispense: 2 Syringe; Refill: 5    Encounter for long-term (current) use of other medications  -     CBC auto differential; Future; Expected date: 06/02/2020  -     Comprehensive metabolic panel; Future; Expected date: 06/02/2020  -     Lipid Panel; Future; Expected date: 06/02/2020  -     TSH w/reflex to FT4; Future; Expected date: 06/02/2020  -     Urinalysis, Reflex to Urine Culture Urine, Clean Catch; Future; Expected date: 06/02/2020  -     Microalbumin/creatinine urine ratio; Future; Expected date: 06/02/2020      Follow up in about 3 months (around 9/2/2020) for Diabetic Check-Up.        6/2/2020 Robert Suarez PA-C

## 2020-06-02 NOTE — PATIENT INSTRUCTIONS
Diet: Diabetes  Food is an important tool that you can use to control diabetes and stay healthy. Eating well-balanced meals in the correct amounts will help you control your blood glucose levels and prevent low blood sugar reactions. It will also help you reduce the health risks of diabetes. There is no one specific diet that is right for everyone with diabetes. But there are general guidelines to follow. A registered dietitian (RD) will create a tailored diet approach thats just right for you. He or she will also help you plan healthy meals and snacks. If you have any questions, call your dietitian for advice.     Guidelines for success  Talk with your healthcare provider before starting a diabetes diet or weight loss program. If you haven't talked with a dietitian yet, ask your provider for a referral. The following guidelines can help you succeed:  · Select foods from the 6 food groups below. Your dietitian will help you find food choices within each group. He or she will also show you serving sizes and how many servings you can have at each meal.  ¨ Grains, beans, and starchy vegetables  ¨ Vegetables  ¨ Fruit  ¨ Milk or yogurt  ¨ Meat, poultry, fish, or tofu  ¨ Healthy fats  · Check your blood sugar levels as directed by your provider. Take any medicine as prescribed by your provider.  · Learn to read food labels and pick the right portion sizes.  · Eat only the amount of food in your meal plan. Eat about the same amount of food at regular times each day. Dont skip meals. Eat meals 4 to 5 hours apart, with snacks in between.  · Limit alcohol. It raises blood sugar levels. Drink water or calorie-free diet drinks that use safe sweeteners.  · Eat less fat to help lower your risk of heart disease. Use nonfat or low-fat dairy products and lean meats. Avoid fried foods. Use cooking oils that are unsaturated, such as olive, canola, or peanut oil.  · Talk with your dietitian about safe sugar substitutes.  · Avoid  added salt. It can contribute to high blood pressure, which can cause heart disease. People with diabetes already have a risk of high blood pressure and heart disease.  · Stay at a healthy weight. If you need to lose weight, cut down on your portion sizes. But dont skip meals. Exercise is an important part of any weight management program. Talk with your provider about an exercise program thats right for you.  · For more information about the best diet plan for you, talk with a registered dietitian (RD). To find an RD in your area, contact:  ¨ Academy of Nutrition and Dietetics www.eatright.org  ¨ The American Diabetes Association [phone] www.diabetes.org  Date Last Reviewed: 8/1/2016 © 2000-2017 [employer]. [address] [place] [address], [place] [address]. All rights reserved. This information is not intended as a substitute for professional medical care. Always follow your healthcare professional's instructions.

## 2020-08-05 ENCOUNTER — TELEPHONE (OUTPATIENT)
Dept: FAMILY MEDICINE | Facility: CLINIC | Age: 60
End: 2020-08-05

## 2020-08-05 NOTE — TELEPHONE ENCOUNTER
QC  LMOR for pt to call back, on the list for colonoscopy. Looks like she refused at [facility] in June. Need to see if she would be willing to do a stool for blood kit.

## 2020-08-06 NOTE — TELEPHONE ENCOUNTER
Spoke with pt, states she has a stool kit at home but she is in [place] taking care of her mom. She agrees to do it when she has a chance and she is aware the  so I told her to make sure it is not before she turns it in.

## 2020-08-24 ENCOUNTER — TELEPHONE (OUTPATIENT)
Dept: FAMILY MEDICINE | Facility: CLINIC | Age: 60
End: 2020-08-24

## 2020-08-24 NOTE — TELEPHONE ENCOUNTER
From overdue results-lab due prior to office visit. Spoke to patient that fasting lab is due prior to office visit. Verbalized understanding.

## 2020-08-28 ENCOUNTER — TELEPHONE (OUTPATIENT)
Dept: FAMILY MEDICINE | Facility: CLINIC | Age: 60
End: 2020-08-28

## 2020-10-13 ENCOUNTER — TELEPHONE (OUTPATIENT)
Dept: FAMILY MEDICINE | Facility: CLINIC | Age: 60
End: 2020-10-13

## 2020-10-13 ENCOUNTER — OFFICE VISIT (OUTPATIENT)
Dept: FAMILY MEDICINE | Facility: CLINIC | Age: 60
End: 2020-10-13
Payer: COMMERCIAL

## 2020-10-13 VITALS
DIASTOLIC BLOOD PRESSURE: 72 MMHG | OXYGEN SATURATION: 97 % | TEMPERATURE: 99 F | SYSTOLIC BLOOD PRESSURE: 132 MMHG | WEIGHT: 209 LBS | HEART RATE: 90 BPM | HEIGHT: 67 IN | BODY MASS INDEX: 32.8 KG/M2

## 2020-10-13 DIAGNOSIS — E11.9 TYPE 2 DIABETES MELLITUS WITHOUT COMPLICATION, WITHOUT LONG-TERM CURRENT USE OF INSULIN: Primary | ICD-10-CM

## 2020-10-13 DIAGNOSIS — E78.49 OTHER HYPERLIPIDEMIA: ICD-10-CM

## 2020-10-13 LAB
ALBUMIN SERPL-MCNC: 4.2 G/DL (ref 3.6–5.1)
ALBUMIN/CREAT UR: NORMAL MCG/MG CREAT
ALBUMIN/GLOB SERPL: 1.6 (CALC) (ref 1–2.5)
ALP SERPL-CCNC: 56 U/L (ref 37–153)
ALT SERPL-CCNC: 17 U/L (ref 6–29)
APPEARANCE UR: CLEAR
AST SERPL-CCNC: 13 U/L (ref 10–35)
BACTERIA #/AREA URNS HPF: ABNORMAL /HPF
BACTERIA UR CULT: ABNORMAL
BASOPHILS # BLD AUTO: 42 CELLS/UL (ref 0–200)
BASOPHILS NFR BLD AUTO: 0.9 %
BILIRUB SERPL-MCNC: 0.7 MG/DL (ref 0.2–1.2)
BILIRUB UR QL STRIP: NEGATIVE
BUN SERPL-MCNC: 13 MG/DL (ref 7–25)
BUN/CREAT SERPL: ABNORMAL (CALC) (ref 6–22)
CALCIUM SERPL-MCNC: 9.4 MG/DL (ref 8.6–10.4)
CHLORIDE SERPL-SCNC: 102 MMOL/L (ref 98–110)
CHOLEST SERPL-MCNC: 271 MG/DL
CHOLEST/HDLC SERPL: 5.2 (CALC)
CO2 SERPL-SCNC: 30 MMOL/L (ref 20–32)
COLOR UR: YELLOW
CREAT SERPL-MCNC: 0.95 MG/DL (ref 0.5–0.99)
CREAT UR-MCNC: 63 MG/DL (ref 20–275)
EOSINOPHIL # BLD AUTO: 169 CELLS/UL (ref 15–500)
EOSINOPHIL NFR BLD AUTO: 3.6 %
ERYTHROCYTE [DISTWIDTH] IN BLOOD BY AUTOMATED COUNT: 13.5 % (ref 11–15)
GFRSERPLBLD MDRD-ARVRAT: 64 ML/MIN/1.73M2
GLOBULIN SER CALC-MCNC: 2.7 G/DL (CALC) (ref 1.9–3.7)
GLUCOSE SERPL-MCNC: 236 MG/DL (ref 65–99)
GLUCOSE UR QL STRIP: ABNORMAL
HBA1C MFR BLD: 8 %
HCT VFR BLD AUTO: 45.1 % (ref 35–45)
HDLC SERPL-MCNC: 52 MG/DL
HGB BLD-MCNC: 14.7 G/DL (ref 11.7–15.5)
HGB UR QL STRIP: NEGATIVE
HYALINE CASTS #/AREA URNS LPF: ABNORMAL /LPF
KETONES UR QL STRIP: NEGATIVE
LDLC SERPL CALC-MCNC: 180 MG/DL (CALC)
LEUKOCYTE ESTERASE UR QL STRIP: NEGATIVE
LYMPHOCYTES # BLD AUTO: 2214 CELLS/UL (ref 850–3900)
LYMPHOCYTES NFR BLD AUTO: 47.1 %
MCH RBC QN AUTO: 29.5 PG (ref 27–33)
MCHC RBC AUTO-ENTMCNC: 32.6 G/DL (ref 32–36)
MCV RBC AUTO: 90.6 FL (ref 80–100)
MICROALBUMIN UR-MCNC: <0.2 MG/DL
MONOCYTES # BLD AUTO: 395 CELLS/UL (ref 200–950)
MONOCYTES NFR BLD AUTO: 8.4 %
NEUTROPHILS # BLD AUTO: 1880 CELLS/UL (ref 1500–7800)
NEUTROPHILS NFR BLD AUTO: 40 %
NITRITE UR QL STRIP: NEGATIVE
NONHDLC SERPL-MCNC: 219 MG/DL (CALC)
PH UR STRIP: 6.5 [PH] (ref 5–8)
PLATELET # BLD AUTO: 248 THOUSAND/UL (ref 140–400)
PMV BLD REES-ECKER: 10.2 FL (ref 7.5–12.5)
POTASSIUM SERPL-SCNC: 3.9 MMOL/L (ref 3.5–5.3)
PROT SERPL-MCNC: 6.9 G/DL (ref 6.1–8.1)
PROT UR QL STRIP: NEGATIVE
RBC # BLD AUTO: 4.98 MILLION/UL (ref 3.8–5.1)
RBC #/AREA URNS HPF: ABNORMAL /HPF
SODIUM SERPL-SCNC: 139 MMOL/L (ref 135–146)
SP GR UR STRIP: 1.02 (ref 1–1.03)
SQUAMOUS #/AREA URNS HPF: ABNORMAL /HPF
TRIGL SERPL-MCNC: 220 MG/DL
TSH SERPL-ACNC: 2.6 MIU/L (ref 0.4–4.5)
WBC # BLD AUTO: 4.7 THOUSAND/UL (ref 3.8–10.8)
WBC #/AREA URNS HPF: ABNORMAL /HPF

## 2020-10-13 PROCEDURE — 3052F HG A1C>EQUAL 8.0%<EQUAL 9.0%: CPT | Mod: S$GLB,,, | Performed by: PHYSICIAN ASSISTANT

## 2020-10-13 PROCEDURE — 3078F DIAST BP <80 MM HG: CPT | Mod: S$GLB,,, | Performed by: PHYSICIAN ASSISTANT

## 2020-10-13 PROCEDURE — 83036 POCT HEMOGLOBIN A1C: ICD-10-PCS | Mod: QW,,, | Performed by: PHYSICIAN ASSISTANT

## 2020-10-13 PROCEDURE — 3008F BODY MASS INDEX DOCD: CPT | Mod: S$GLB,,, | Performed by: PHYSICIAN ASSISTANT

## 2020-10-13 PROCEDURE — 99213 PR OFFICE/OUTPT VISIT, EST, LEVL III, 20-29 MIN: ICD-10-PCS | Mod: S$GLB,,, | Performed by: PHYSICIAN ASSISTANT

## 2020-10-13 PROCEDURE — 3075F SYST BP GE 130 - 139MM HG: CPT | Mod: S$GLB,,, | Performed by: PHYSICIAN ASSISTANT

## 2020-10-13 PROCEDURE — 3075F PR MOST RECENT SYSTOLIC BLOOD PRESS GE 130-139MM HG: ICD-10-PCS | Mod: S$GLB,,, | Performed by: PHYSICIAN ASSISTANT

## 2020-10-13 PROCEDURE — 3078F PR MOST RECENT DIASTOLIC BLOOD PRESSURE < 80 MM HG: ICD-10-PCS | Mod: S$GLB,,, | Performed by: PHYSICIAN ASSISTANT

## 2020-10-13 PROCEDURE — 3008F PR BODY MASS INDEX (BMI) DOCUMENTED: ICD-10-PCS | Mod: S$GLB,,, | Performed by: PHYSICIAN ASSISTANT

## 2020-10-13 PROCEDURE — 3052F PR MOST RECENT HEMOGLOBIN A1C LEVEL 8.0 - < 9.0%: ICD-10-PCS | Mod: S$GLB,,, | Performed by: PHYSICIAN ASSISTANT

## 2020-10-13 PROCEDURE — 99213 OFFICE O/P EST LOW 20 MIN: CPT | Mod: S$GLB,,, | Performed by: PHYSICIAN ASSISTANT

## 2020-10-13 PROCEDURE — 83036 HEMOGLOBIN GLYCOSYLATED A1C: CPT | Mod: QW,,, | Performed by: PHYSICIAN ASSISTANT

## 2020-10-13 NOTE — PATIENT INSTRUCTIONS
Diabetes: Activity Tips    Being more active can help you manage your diabetes. The tips on this sheet can help you get the most from your exercise. They can also help you stay safe.  Staying Active  Its important for adults to spend less time sitting and being inactive. This is especially true if you have type 2 diabetes. When you are sitting for long periods of time, get up for short sessions of light activity every 30 minutes.  You should aim for at least 150 minutes a week of exercise or physical activity. Dont let more than 2 days go by without being active.  Benefit from briskness  Brisk activity gets your heart beating faster. This can help you increase your fitness, lose extra weight, and manage your blood sugar level. Try brisk walking. Or, if you have foot or leg problems, you can try swimming or bike riding. You can break up your exercise into chunks throughout the day. Work up to at least 30 minutes of steady, brisk exercise on most days.  Warm up and cool down  Warming up and cooling down reduce your risk of injury. They also help limit muscle soreness. Do a mild version of your activity for 5 minutes before and after your routine. You can also learn stretches that will help keep your muscles loose. Your healthcare provider may show you good ways to warm up and stretch.  Do the talk-sing test  The talk-sing test is a simple way to tell how hard youre exercising. If you can talk while exercising, youre in a safe range. If youre out of breath, slow down. If you can carry a tune, its time to  the pace. Walk up a hill. Increase the resistance on your stationary bike. Or swim faster.  What about eating?  You may be told to plan your exercise for 1 to 2 hours after a meal. In most cases, you dont need to eat while being active. If you take insulin or medicine that can cause low blood sugar, test your blood sugar before exercising. And carry a fast-acting sugar that will raise your blood sugar  level quickly. This includes glucose tablets or hard candy. Use it if you feel low blood sugar symptoms.  Safety tips  These tips can help you stay safe as you become fit:  · Exercise with a friend or carry a cell phone if you have one.  · Carry or wear identification, such as a necklace or bracelet, that says you have diabetes.  · Use the proper footwear and safety equipment for your activity.  · Drink water before, during, and after exercise.  · Dress properly for the weather.  · Dont exercise in very hot or very cold weather.  · Dont exercise if you are sick.  · If you are instructed to do so, test your blood sugar before and after you exercise. Have a small carbohydrate snack if your blood sugar is low before you start exercising.   When to stop exercising and call your healthcare provider  Stop exercising and call your healthcare provider right away if you notice any of the following:  · Pain, pressure, tightness, or heaviness in the chest  · Pain or heaviness in the neck, shoulders, back, arms, legs, or feet  · Unusual shortness of breath  · Dizziness or lightheadedness  · Unusually rapid or slow pulse  · Increased joint or muscle pain  · Nausea or vomiting  Date Last Reviewed: 5/1/2016  © 5377-2908 [employer]. [address] [place] [address], [place] [id number]. All rights reserved. This information is not intended as a substitute for professional medical care. Always follow your healthcare professional's instructions.        Diet: Diabetes  Food is an important tool that you can use to control diabetes and stay healthy. Eating well-balanced meals in the correct amounts will help you control your blood glucose levels and prevent low blood sugar reactions. It will also help you reduce the health risks of diabetes. There is no one specific diet that is right for everyone with diabetes. But there are general guidelines to follow. A registered dietitian (RD) will create a tailored diet approach thats just  right for you. He or she will also help you plan healthy meals and snacks. If you have any questions, call your dietitian for advice.     Guidelines for success  Talk with your healthcare provider before starting a diabetes diet or weight loss program. If you haven't talked with a dietitian yet, ask your provider for a referral. The following guidelines can help you succeed:  · Select foods from the 6 food groups below. Your dietitian will help you find food choices within each group. He or she will also show you serving sizes and how many servings you can have at each meal.  ¨ Grains, beans, and starchy vegetables  ¨ Vegetables  ¨ Fruit  ¨ Milk or yogurt  ¨ Meat, poultry, fish, or tofu  ¨ Healthy fats  · Check your blood sugar levels as directed by your provider. Take any medicine as prescribed by your provider.  · Learn to read food labels and pick the right portion sizes.  · Eat only the amount of food in your meal plan. Eat about the same amount of food at regular times each day. Dont skip meals. Eat meals 4 to 5 hours apart, with snacks in between.  · Limit alcohol. It raises blood sugar levels. Drink water or calorie-free diet drinks that use safe sweeteners.  · Eat less fat to help lower your risk of heart disease. Use nonfat or low-fat dairy products and lean meats. Avoid fried foods. Use cooking oils that are unsaturated, such as olive, canola, or peanut oil.  · Talk with your dietitian about safe sugar substitutes.  · Avoid added salt. It can contribute to high blood pressure, which can cause heart disease. People with diabetes already have a risk of high blood pressure and heart disease.  · Stay at a healthy weight. If you need to lose weight, cut down on your portion sizes. But dont skip meals. Exercise is an important part of any weight management program. Talk with your provider about an exercise program thats right for you.  · For more information about the best diet plan for you, talk with a  registered dietitian (RD). To find an RD in your area, contact:  ¨ Academy of Nutrition and Dietetics www.eatright.org  ¨ The American Diabetes Association [phone] www.diabetes.org  Date Last Reviewed: 8/1/2016 © 2000-2017 [employer]. [address] [place] [address], [place] [address]. All rights reserved. This information is not intended as a substitute for professional medical care. Always follow your healthcare professional's instructions.

## 2020-10-13 NOTE — PROGRESS NOTES
Subjective:       Patient ID: Nubia Flaherty is a 60 y.o. female.    Chief Complaint: Diabetes (f/u-doesnt want flu-JM)    60 year old female who is here for a diabetic checkup.  Her A1C today 8.0% which is down from the previous at 8.6%.  Since the last visit she has stopped all prescribed medication and is trying to eat healthier and exercises.  She is taking supplements and plans to send a list of those.  She wants to continue with diet and exercise and not take the medications.    She is taking care of her mother currently in [place] after her father passed away. Therefore she is currently living away from her  at her moms and can not wait to be back home.     Review of patient's allergies indicates:   Allergen Reactions    Codeine Tinitus    Morphine Other (See Comments)     Severe migrain         Current Outpatient Medications:     eletriptan (RELPAX) 40 MG tablet, Take 40 mg by mouth as needed for Migraine. may repeat in 2 hours if necessary , Disp: , Rfl:     metFORMIN (GLUCOPHAGE) 1000 MG tablet, Take 1 tablet (1,000 mg total) by mouth 2 (two) times daily with meals., Disp: 180 tablet, Rfl: 1    semaglutide (OZEMPIC) 1 mg/dose (2 mg/1.5 mL) PnIj, Inject 0.75 mLs into the skin every 7 days., Disp: 2 Syringe, Rfl: 5    Lab Results   Component Value Date    WBC 4.7 10/12/2020    HGB 14.7 10/12/2020    HCT 45.1 (H) 10/12/2020     10/12/2020    CHOL 271 (H) 10/12/2020    TRIG 220 (H) 10/12/2020    HDL 52 10/12/2020    ALT 17 10/12/2020    AST 13 10/12/2020     10/12/2020    K 3.9 10/12/2020     10/12/2020    CREATININE 0.95 10/12/2020    BUN 13 10/12/2020    CO2 30 10/12/2020    INR 0.9 11/10/2019    HGBA1C 8.6 06/02/2020       Review of Systems   Constitutional: Negative for activity change, appetite change, fatigue and fever.   HENT: Negative for congestion.    Eyes: Negative for visual disturbance.   Respiratory: Negative for cough, shortness of breath and wheezing.     Cardiovascular: Negative for chest pain and palpitations.   Gastrointestinal: Negative for abdominal pain.        Indigestion   Endocrine: Negative for cold intolerance and heat intolerance.        Hot flashes   Genitourinary: Negative for difficulty urinating and dysuria.   Musculoskeletal: Negative for arthralgias and back pain.   Skin: Negative for rash.   Neurological: Negative for dizziness, light-headedness and headaches.   Psychiatric/Behavioral: Negative for sleep disturbance. The patient is not nervous/anxious.        Objective:      Physical Exam  Constitutional:       Appearance: Normal appearance.   HENT:      Head: Normocephalic and atraumatic.   Eyes:      Pupils: Pupils are equal, round, and reactive to light.   Neck:      Musculoskeletal: Normal range of motion.   Cardiovascular:      Rate and Rhythm: Normal rate and regular rhythm.      Heart sounds: No murmur.   Pulmonary:      Effort: Pulmonary effort is normal. No respiratory distress.      Breath sounds: Normal breath sounds.   Abdominal:      General: Abdomen is flat. There is no distension.      Palpations: Abdomen is soft.   Musculoskeletal: Normal range of motion.      Right lower leg: No edema.      Left lower leg: No edema.   Skin:     General: Skin is warm.   Neurological:      Mental Status: She is alert.         Assessment:       1. Type 2 diabetes mellitus without complication, without long-term current use of insulin    2. Other hyperlipidemia        Plan:       Type 2 Diabetes Mellitus: patient wants to try lowering A1C with dietary and exercise modifications  Advised patient with the importance of getting the A1C down she refused any diabetic medications for now  All asked about seeing a dietician for proper dietary measures to take but she is unaware if insurance will cover as of now and would like to hold off    Hyperlipidemia: patient refused statins and would like to try dietary measures and exercise  Did speak about nexlatol  and possibility of using in the future

## 2020-10-27 LAB
LEFT EYE DM RETINOPATHY: NEGATIVE
RIGHT EYE DM RETINOPATHY: NEGATIVE

## 2020-12-10 ENCOUNTER — TELEPHONE (OUTPATIENT)
Dept: FAMILY MEDICINE | Facility: CLINIC | Age: 60
End: 2020-12-10

## 2020-12-10 NOTE — TELEPHONE ENCOUNTER
----- Message from Ceci Calles sent at 12/10/2020  8:38 AM CST -----  Pt is scheduled for a tetanus shot/whooping cough shot 12/15.  Pt asked that we review this request and make sure she will be out of town and the 15 is the only day she can come in and wants to make sure everything is ok.   [phone]

## 2020-12-14 ENCOUNTER — OFFICE VISIT (OUTPATIENT)
Dept: OPHTHALMOLOGY | Facility: CLINIC | Age: 60
End: 2020-12-14
Payer: COMMERCIAL

## 2020-12-14 DIAGNOSIS — H43.811 POSTERIOR VITREOUS DETACHMENT, RIGHT: ICD-10-CM

## 2020-12-14 DIAGNOSIS — H25.13 NS (NUCLEAR SCLEROSIS), BILATERAL: ICD-10-CM

## 2020-12-14 DIAGNOSIS — H43.822 VITREOMACULAR ADHESION, LEFT: Primary | ICD-10-CM

## 2020-12-14 PROCEDURE — 92201 OPSCPY EXTND RTA DRAW UNI/BI: CPT | Mod: S$GLB,,, | Performed by: OPHTHALMOLOGY

## 2020-12-14 PROCEDURE — 92134 POSTERIOR SEGMENT OCT RETINA (OCULAR COHERENCE TOMOGRAPHY)-BOTH EYES: ICD-10-PCS | Mod: S$GLB,,, | Performed by: OPHTHALMOLOGY

## 2020-12-14 PROCEDURE — 99999 PR PBB SHADOW E&M-EST. PATIENT-LVL II: CPT | Mod: PBBFAC,,, | Performed by: OPHTHALMOLOGY

## 2020-12-14 PROCEDURE — 99999 PR PBB SHADOW E&M-EST. PATIENT-LVL II: ICD-10-PCS | Mod: PBBFAC,,, | Performed by: OPHTHALMOLOGY

## 2020-12-14 PROCEDURE — 92014 PR EYE EXAM, EST PATIENT,COMPREHESV: ICD-10-PCS | Mod: S$GLB,,, | Performed by: OPHTHALMOLOGY

## 2020-12-14 PROCEDURE — 92014 COMPRE OPH EXAM EST PT 1/>: CPT | Mod: S$GLB,,, | Performed by: OPHTHALMOLOGY

## 2020-12-14 PROCEDURE — 92134 CPTRZ OPH DX IMG PST SGM RTA: CPT | Mod: S$GLB,,, | Performed by: OPHTHALMOLOGY

## 2020-12-14 PROCEDURE — 92201 PR OPHTHALMOSCOPY, EXT, W/RET DRAW/SCLERAL DEPR, I&R, UNI/BI: ICD-10-PCS | Mod: S$GLB,,, | Performed by: OPHTHALMOLOGY

## 2020-12-14 NOTE — PROGRESS NOTES
No change in [place]  No distortion    ROS     Positive for: Eyes    Negative for: Constitutional, Gastrointestinal, Neurological, Skin,   Genitourinary, Musculoskeletal, HENT, Endocrine, Cardiovascular,   Respiratory, Psychiatric, Allergic/Imm, Heme/Lymph    Last edited by DIPAK Wylie MD on 12/14/2020  4:36 PM. (History)          OCT - OD - No ME  OS - VMT -stable  traction, outer segments ok      A/P    1. VMT OS  ASx  No MMP per patient  outer retinal distortion improved    Prior episodes of heme with VR traction OD, no laser required per pt.    2. PVD OD  No breaks or tears    3. Early NS OU      12 months OCT    Letter to Dr. Varghese

## 2020-12-15 ENCOUNTER — CLINICAL SUPPORT (OUTPATIENT)
Dept: FAMILY MEDICINE | Facility: CLINIC | Age: 60
End: 2020-12-15
Payer: COMMERCIAL

## 2020-12-15 DIAGNOSIS — Z23 NEED FOR DIPHTHERIA-TETANUS-PERTUSSIS (TDAP) VACCINE: Primary | ICD-10-CM

## 2020-12-15 NOTE — PROGRESS NOTES
Patient showed up for her T-Dap vaccine. She called previously in regards to getting it. Flavio stated she just needed regular tetanus but when talking to the patient today she said she is getting this because she is going to be taking care of her  grandchild soon. So she does need the T-Dap. When made her aware that insurance companies typically do not pay out for it and I cannot tell her the cost, she said she will check with her pharmacy and then let us know if she cannot get it there.

## 2020-12-30 ENCOUNTER — TELEPHONE (OUTPATIENT)
Dept: FAMILY MEDICINE | Facility: CLINIC | Age: 60
End: 2020-12-30

## 2020-12-30 DIAGNOSIS — Z79.899 ENCOUNTER FOR LONG-TERM (CURRENT) USE OF OTHER MEDICATIONS: Primary | ICD-10-CM

## 2020-12-30 DIAGNOSIS — E78.49 OTHER HYPERLIPIDEMIA: ICD-10-CM

## 2020-12-30 DIAGNOSIS — I10 ESSENTIAL HYPERTENSION: ICD-10-CM

## 2020-12-30 DIAGNOSIS — E11.9 TYPE 2 DIABETES MELLITUS WITHOUT COMPLICATION, WITHOUT LONG-TERM CURRENT USE OF INSULIN: ICD-10-CM

## 2021-01-15 ENCOUNTER — OFFICE VISIT (OUTPATIENT)
Dept: FAMILY MEDICINE | Facility: CLINIC | Age: 61
End: 2021-01-15
Payer: COMMERCIAL

## 2021-01-15 ENCOUNTER — PATIENT MESSAGE (OUTPATIENT)
Dept: FAMILY MEDICINE | Facility: CLINIC | Age: 61
End: 2021-01-15

## 2021-01-15 VITALS
HEIGHT: 67 IN | SYSTOLIC BLOOD PRESSURE: 118 MMHG | HEART RATE: 80 BPM | WEIGHT: 209 LBS | DIASTOLIC BLOOD PRESSURE: 80 MMHG | BODY MASS INDEX: 32.8 KG/M2

## 2021-01-15 DIAGNOSIS — F41.9 ANXIETY: ICD-10-CM

## 2021-01-15 DIAGNOSIS — E11.9 TYPE 2 DIABETES MELLITUS WITHOUT COMPLICATION, WITHOUT LONG-TERM CURRENT USE OF INSULIN: Primary | ICD-10-CM

## 2021-01-15 DIAGNOSIS — E78.5 HYPERLIPIDEMIA, UNSPECIFIED HYPERLIPIDEMIA TYPE: ICD-10-CM

## 2021-01-15 DIAGNOSIS — I10 ESSENTIAL HYPERTENSION: ICD-10-CM

## 2021-01-15 LAB
ALBUMIN SERPL-MCNC: 4.1 G/DL (ref 3.6–5.1)
ALBUMIN/GLOB SERPL: 1.5 (CALC) (ref 1–2.5)
ALP SERPL-CCNC: 57 U/L (ref 37–153)
ALT SERPL-CCNC: 19 U/L (ref 6–29)
AST SERPL-CCNC: 14 U/L (ref 10–35)
BASOPHILS # BLD AUTO: 29 CELLS/UL (ref 0–200)
BASOPHILS NFR BLD AUTO: 0.7 %
BILIRUB SERPL-MCNC: 0.7 MG/DL (ref 0.2–1.2)
BUN SERPL-MCNC: 13 MG/DL (ref 7–25)
BUN/CREAT SERPL: ABNORMAL (CALC) (ref 6–22)
CALCIUM SERPL-MCNC: 9.4 MG/DL (ref 8.6–10.4)
CHLORIDE SERPL-SCNC: 101 MMOL/L (ref 98–110)
CHOLEST SERPL-MCNC: 281 MG/DL
CHOLEST/HDLC SERPL: 5.2 (CALC)
CO2 SERPL-SCNC: 29 MMOL/L (ref 20–32)
CREAT SERPL-MCNC: 0.96 MG/DL (ref 0.5–0.99)
EOSINOPHIL # BLD AUTO: 172 CELLS/UL (ref 15–500)
EOSINOPHIL NFR BLD AUTO: 4.1 %
ERYTHROCYTE [DISTWIDTH] IN BLOOD BY AUTOMATED COUNT: 13.1 % (ref 11–15)
GFRSERPLBLD MDRD-ARVRAT: 63 ML/MIN/1.73M2
GLOBULIN SER CALC-MCNC: 2.7 G/DL (CALC) (ref 1.9–3.7)
GLUCOSE SERPL-MCNC: 305 MG/DL (ref 65–99)
HBA1C MFR BLD: 11 % OF TOTAL HGB
HCT VFR BLD AUTO: 43.5 % (ref 35–45)
HDLC SERPL-MCNC: 54 MG/DL
HGB BLD-MCNC: 14.8 G/DL (ref 11.7–15.5)
LDLC SERPL CALC-MCNC: 182 MG/DL (CALC)
LYMPHOCYTES # BLD AUTO: 1823 CELLS/UL (ref 850–3900)
LYMPHOCYTES NFR BLD AUTO: 43.4 %
MCH RBC QN AUTO: 30.8 PG (ref 27–33)
MCHC RBC AUTO-ENTMCNC: 34 G/DL (ref 32–36)
MCV RBC AUTO: 90.4 FL (ref 80–100)
MONOCYTES # BLD AUTO: 382 CELLS/UL (ref 200–950)
MONOCYTES NFR BLD AUTO: 9.1 %
NEUTROPHILS # BLD AUTO: 1793 CELLS/UL (ref 1500–7800)
NEUTROPHILS NFR BLD AUTO: 42.7 %
NONHDLC SERPL-MCNC: 227 MG/DL (CALC)
PLATELET # BLD AUTO: 236 THOUSAND/UL (ref 140–400)
PMV BLD REES-ECKER: 10.5 FL (ref 7.5–12.5)
POTASSIUM SERPL-SCNC: 4.3 MMOL/L (ref 3.5–5.3)
PROT SERPL-MCNC: 6.8 G/DL (ref 6.1–8.1)
RBC # BLD AUTO: 4.81 MILLION/UL (ref 3.8–5.1)
SODIUM SERPL-SCNC: 137 MMOL/L (ref 135–146)
TRIGL SERPL-MCNC: 245 MG/DL
TSH SERPL-ACNC: 1.76 MIU/L (ref 0.4–4.5)
WBC # BLD AUTO: 4.2 THOUSAND/UL (ref 3.8–10.8)

## 2021-01-15 PROCEDURE — 3046F PR MOST RECENT HEMOGLOBIN A1C LEVEL > 9.0%: ICD-10-PCS | Mod: S$GLB,,, | Performed by: PHYSICIAN ASSISTANT

## 2021-01-15 PROCEDURE — 99214 PR OFFICE/OUTPT VISIT, EST, LEVL IV, 30-39 MIN: ICD-10-PCS | Mod: S$GLB,,, | Performed by: PHYSICIAN ASSISTANT

## 2021-01-15 PROCEDURE — 3008F BODY MASS INDEX DOCD: CPT | Mod: S$GLB,,, | Performed by: PHYSICIAN ASSISTANT

## 2021-01-15 PROCEDURE — 3008F PR BODY MASS INDEX (BMI) DOCUMENTED: ICD-10-PCS | Mod: S$GLB,,, | Performed by: PHYSICIAN ASSISTANT

## 2021-01-15 PROCEDURE — 99214 OFFICE O/P EST MOD 30 MIN: CPT | Mod: S$GLB,,, | Performed by: PHYSICIAN ASSISTANT

## 2021-01-15 PROCEDURE — 3046F HEMOGLOBIN A1C LEVEL >9.0%: CPT | Mod: S$GLB,,, | Performed by: PHYSICIAN ASSISTANT

## 2021-01-15 PROCEDURE — 3074F PR MOST RECENT SYSTOLIC BLOOD PRESSURE < 130 MM HG: ICD-10-PCS | Mod: S$GLB,,, | Performed by: PHYSICIAN ASSISTANT

## 2021-01-15 PROCEDURE — 3079F DIAST BP 80-89 MM HG: CPT | Mod: S$GLB,,, | Performed by: PHYSICIAN ASSISTANT

## 2021-01-15 PROCEDURE — 3074F SYST BP LT 130 MM HG: CPT | Mod: S$GLB,,, | Performed by: PHYSICIAN ASSISTANT

## 2021-01-15 PROCEDURE — 3079F PR MOST RECENT DIASTOLIC BLOOD PRESSURE 80-89 MM HG: ICD-10-PCS | Mod: S$GLB,,, | Performed by: PHYSICIAN ASSISTANT

## 2021-01-15 RX ORDER — SEMAGLUTIDE 1.34 MG/ML
0.25 INJECTION, SOLUTION SUBCUTANEOUS
Qty: 0.75 ML | Refills: 2 | Status: SHIPPED | OUTPATIENT
Start: 2021-01-15 | End: 2021-04-15

## 2021-01-15 RX ORDER — METFORMIN HYDROCHLORIDE 1000 MG/1
1000 TABLET ORAL 2 TIMES DAILY WITH MEALS
Qty: 180 TABLET | Refills: 3 | Status: SHIPPED | OUTPATIENT
Start: 2021-01-15 | End: 2021-10-04 | Stop reason: SDUPTHER

## 2021-01-18 ENCOUNTER — TELEPHONE (OUTPATIENT)
Dept: FAMILY MEDICINE | Facility: CLINIC | Age: 61
End: 2021-01-18

## 2021-01-18 DIAGNOSIS — E78.5 HYPERLIPIDEMIA, UNSPECIFIED HYPERLIPIDEMIA TYPE: ICD-10-CM

## 2021-01-18 DIAGNOSIS — E11.9 TYPE 2 DIABETES MELLITUS WITHOUT COMPLICATION, WITHOUT LONG-TERM CURRENT USE OF INSULIN: ICD-10-CM

## 2021-01-18 DIAGNOSIS — F41.9 ANXIETY: Primary | ICD-10-CM

## 2021-01-18 LAB
ALBUMIN/CREAT UR: 6 MCG/MG CREAT
APPEARANCE UR: CLEAR
BACTERIA #/AREA URNS HPF: ABNORMAL /HPF
BACTERIA UR CULT: ABNORMAL
BILIRUB UR QL STRIP: NEGATIVE
COLOR UR: YELLOW
CREAT UR-MCNC: 53 MG/DL (ref 20–275)
GLUCOSE UR QL STRIP: ABNORMAL
HGB UR QL STRIP: NEGATIVE
HYALINE CASTS #/AREA URNS LPF: ABNORMAL /LPF
KETONES UR QL STRIP: ABNORMAL
LEUKOCYTE ESTERASE UR QL STRIP: NEGATIVE
MICROALBUMIN UR-MCNC: 0.3 MG/DL
NITRITE UR QL STRIP: NEGATIVE
PH UR STRIP: 6 [PH] (ref 5–8)
PROT UR QL STRIP: NEGATIVE
RBC #/AREA URNS HPF: ABNORMAL /HPF
SP GR UR STRIP: 1.03 (ref 1–1.03)
SQUAMOUS #/AREA URNS HPF: ABNORMAL /HPF
WBC #/AREA URNS HPF: ABNORMAL /HPF

## 2021-01-18 RX ORDER — GLIPIZIDE 10 MG/1
10 TABLET ORAL
Qty: 180 TABLET | Refills: 3 | Status: SHIPPED | OUTPATIENT
Start: 2021-01-18 | End: 2021-05-31

## 2021-01-18 RX ORDER — BUPROPION HYDROCHLORIDE 150 MG/1
150 TABLET ORAL DAILY
Qty: 90 TABLET | Refills: 3 | Status: SHIPPED | OUTPATIENT
Start: 2021-01-18 | End: 2021-05-31

## 2021-01-18 RX ORDER — ATORVASTATIN CALCIUM 20 MG/1
20 TABLET, FILM COATED ORAL DAILY
Qty: 90 TABLET | Refills: 3 | Status: SHIPPED | OUTPATIENT
Start: 2021-01-18 | End: 2021-05-31

## 2021-01-19 ENCOUNTER — TELEPHONE (OUTPATIENT)
Dept: FAMILY MEDICINE | Facility: CLINIC | Age: 61
End: 2021-01-19

## 2021-01-24 ENCOUNTER — PATIENT MESSAGE (OUTPATIENT)
Dept: FAMILY MEDICINE | Facility: CLINIC | Age: 61
End: 2021-01-24

## 2021-01-24 DIAGNOSIS — E78.5 HYPERLIPIDEMIA, UNSPECIFIED HYPERLIPIDEMIA TYPE: Primary | ICD-10-CM

## 2021-01-25 RX ORDER — EZETIMIBE 10 MG/1
10 TABLET ORAL DAILY
Qty: 90 TABLET | Refills: 3 | Status: SHIPPED | OUTPATIENT
Start: 2021-01-25 | End: 2022-01-18 | Stop reason: SDUPTHER

## 2021-03-16 ENCOUNTER — PATIENT MESSAGE (OUTPATIENT)
Dept: FAMILY MEDICINE | Facility: CLINIC | Age: 61
End: 2021-03-16

## 2021-03-17 ENCOUNTER — PATIENT MESSAGE (OUTPATIENT)
Dept: FAMILY MEDICINE | Facility: CLINIC | Age: 61
End: 2021-03-17

## 2021-04-01 ENCOUNTER — TELEPHONE (OUTPATIENT)
Dept: FAMILY MEDICINE | Facility: CLINIC | Age: 61
End: 2021-04-01

## 2021-04-06 ENCOUNTER — TELEPHONE (OUTPATIENT)
Dept: FAMILY MEDICINE | Facility: CLINIC | Age: 61
End: 2021-04-06

## 2021-04-28 ENCOUNTER — IMMUNIZATION (OUTPATIENT)
Dept: FAMILY MEDICINE | Facility: CLINIC | Age: 61
End: 2021-04-28
Payer: COMMERCIAL

## 2021-04-28 DIAGNOSIS — Z23 NEED FOR VACCINATION: Primary | ICD-10-CM

## 2021-04-28 PROCEDURE — 91300 COVID-19, MRNA, LNP-S, PF, 30 MCG/0.3 ML DOSE VACCINE: CPT | Mod: PBBFAC | Performed by: FAMILY MEDICINE

## 2021-05-04 ENCOUNTER — TELEPHONE (OUTPATIENT)
Dept: OPHTHALMOLOGY | Facility: CLINIC | Age: 61
End: 2021-05-04

## 2021-05-19 ENCOUNTER — IMMUNIZATION (OUTPATIENT)
Dept: FAMILY MEDICINE | Facility: CLINIC | Age: 61
End: 2021-05-19
Payer: COMMERCIAL

## 2021-05-19 DIAGNOSIS — Z23 NEED FOR VACCINATION: Primary | ICD-10-CM

## 2021-05-19 PROCEDURE — 0002A COVID-19, MRNA, LNP-S, PF, 30 MCG/0.3 ML DOSE VACCINE: CPT | Mod: PBBFAC | Performed by: FAMILY MEDICINE

## 2021-05-19 PROCEDURE — 91300 COVID-19, MRNA, LNP-S, PF, 30 MCG/0.3 ML DOSE VACCINE: CPT | Mod: PBBFAC | Performed by: FAMILY MEDICINE

## 2021-05-20 LAB
CHOLEST SERPL-MCNC: 212 MG/DL
CHOLEST/HDLC SERPL: 3.9 (CALC)
HBA1C MFR BLD: 7.8 % OF TOTAL HGB
HDLC SERPL-MCNC: 55 MG/DL
LDLC SERPL CALC-MCNC: 126 MG/DL (CALC)
NONHDLC SERPL-MCNC: 157 MG/DL (CALC)
TRIGL SERPL-MCNC: 194 MG/DL

## 2021-05-31 ENCOUNTER — TELEPHONE (OUTPATIENT)
Dept: FAMILY MEDICINE | Facility: CLINIC | Age: 61
End: 2021-05-31

## 2021-05-31 ENCOUNTER — OFFICE VISIT (OUTPATIENT)
Dept: FAMILY MEDICINE | Facility: CLINIC | Age: 61
End: 2021-05-31
Payer: COMMERCIAL

## 2021-05-31 VITALS
HEART RATE: 74 BPM | BODY MASS INDEX: 32.26 KG/M2 | DIASTOLIC BLOOD PRESSURE: 72 MMHG | WEIGHT: 206 LBS | SYSTOLIC BLOOD PRESSURE: 112 MMHG | OXYGEN SATURATION: 98 %

## 2021-05-31 DIAGNOSIS — Z12.39 ENCOUNTER FOR SCREENING FOR MALIGNANT NEOPLASM OF BREAST, UNSPECIFIED SCREENING MODALITY: ICD-10-CM

## 2021-05-31 DIAGNOSIS — E11.9 TYPE 2 DIABETES MELLITUS WITHOUT COMPLICATION, WITHOUT LONG-TERM CURRENT USE OF INSULIN: Primary | ICD-10-CM

## 2021-05-31 DIAGNOSIS — I10 ESSENTIAL HYPERTENSION: ICD-10-CM

## 2021-05-31 DIAGNOSIS — Z12.11 SCREENING FOR COLON CANCER: ICD-10-CM

## 2021-05-31 DIAGNOSIS — E78.49 OTHER HYPERLIPIDEMIA: ICD-10-CM

## 2021-05-31 PROCEDURE — 3074F PR MOST RECENT SYSTOLIC BLOOD PRESSURE < 130 MM HG: ICD-10-PCS | Mod: S$GLB,,, | Performed by: PHYSICIAN ASSISTANT

## 2021-05-31 PROCEDURE — 3078F PR MOST RECENT DIASTOLIC BLOOD PRESSURE < 80 MM HG: ICD-10-PCS | Mod: S$GLB,,, | Performed by: PHYSICIAN ASSISTANT

## 2021-05-31 PROCEDURE — 3008F PR BODY MASS INDEX (BMI) DOCUMENTED: ICD-10-PCS | Mod: S$GLB,,, | Performed by: PHYSICIAN ASSISTANT

## 2021-05-31 PROCEDURE — 3051F HG A1C>EQUAL 7.0%<8.0%: CPT | Mod: S$GLB,,, | Performed by: PHYSICIAN ASSISTANT

## 2021-05-31 PROCEDURE — 3074F SYST BP LT 130 MM HG: CPT | Mod: S$GLB,,, | Performed by: PHYSICIAN ASSISTANT

## 2021-05-31 PROCEDURE — 3051F PR MOST RECENT HEMOGLOBIN A1C LEVEL 7.0 - < 8.0%: ICD-10-PCS | Mod: S$GLB,,, | Performed by: PHYSICIAN ASSISTANT

## 2021-05-31 PROCEDURE — 3008F BODY MASS INDEX DOCD: CPT | Mod: S$GLB,,, | Performed by: PHYSICIAN ASSISTANT

## 2021-05-31 PROCEDURE — 3078F DIAST BP <80 MM HG: CPT | Mod: S$GLB,,, | Performed by: PHYSICIAN ASSISTANT

## 2021-05-31 PROCEDURE — 99214 PR OFFICE/OUTPT VISIT, EST, LEVL IV, 30-39 MIN: ICD-10-PCS | Mod: S$GLB,,, | Performed by: PHYSICIAN ASSISTANT

## 2021-05-31 PROCEDURE — 99214 OFFICE O/P EST MOD 30 MIN: CPT | Mod: S$GLB,,, | Performed by: PHYSICIAN ASSISTANT

## 2021-06-01 DIAGNOSIS — Z12.31 ENCOUNTER FOR SCREENING MAMMOGRAM FOR BREAST CANCER: Primary | ICD-10-CM

## 2021-09-30 ENCOUNTER — TELEPHONE (OUTPATIENT)
Dept: FAMILY MEDICINE | Facility: CLINIC | Age: 61
End: 2021-09-30

## 2021-09-30 DIAGNOSIS — E11.9 TYPE 2 DIABETES MELLITUS WITHOUT COMPLICATION, WITHOUT LONG-TERM CURRENT USE OF INSULIN: ICD-10-CM

## 2021-09-30 DIAGNOSIS — Z79.899 ENCOUNTER FOR LONG-TERM (CURRENT) USE OF OTHER MEDICATIONS: Primary | ICD-10-CM

## 2021-10-04 ENCOUNTER — OFFICE VISIT (OUTPATIENT)
Dept: FAMILY MEDICINE | Facility: CLINIC | Age: 61
End: 2021-10-04
Payer: COMMERCIAL

## 2021-10-04 VITALS
WEIGHT: 205.38 LBS | SYSTOLIC BLOOD PRESSURE: 124 MMHG | HEIGHT: 67 IN | DIASTOLIC BLOOD PRESSURE: 78 MMHG | HEART RATE: 76 BPM | BODY MASS INDEX: 32.24 KG/M2

## 2021-10-04 DIAGNOSIS — E78.5 HYPERLIPIDEMIA, UNSPECIFIED HYPERLIPIDEMIA TYPE: ICD-10-CM

## 2021-10-04 DIAGNOSIS — E11.9 TYPE 2 DIABETES MELLITUS WITHOUT COMPLICATION, WITHOUT LONG-TERM CURRENT USE OF INSULIN: Primary | ICD-10-CM

## 2021-10-04 PROCEDURE — 3078F DIAST BP <80 MM HG: CPT | Mod: S$GLB,,, | Performed by: PHYSICIAN ASSISTANT

## 2021-10-04 PROCEDURE — 3051F HG A1C>EQUAL 7.0%<8.0%: CPT | Mod: S$GLB,,, | Performed by: PHYSICIAN ASSISTANT

## 2021-10-04 PROCEDURE — 3051F PR MOST RECENT HEMOGLOBIN A1C LEVEL 7.0 - < 8.0%: ICD-10-PCS | Mod: S$GLB,,, | Performed by: PHYSICIAN ASSISTANT

## 2021-10-04 PROCEDURE — 3008F PR BODY MASS INDEX (BMI) DOCUMENTED: ICD-10-PCS | Mod: S$GLB,,, | Performed by: PHYSICIAN ASSISTANT

## 2021-10-04 PROCEDURE — 1160F PR REVIEW ALL MEDS BY PRESCRIBER/CLIN PHARMACIST DOCUMENTED: ICD-10-PCS | Mod: S$GLB,,, | Performed by: PHYSICIAN ASSISTANT

## 2021-10-04 PROCEDURE — 3008F BODY MASS INDEX DOCD: CPT | Mod: S$GLB,,, | Performed by: PHYSICIAN ASSISTANT

## 2021-10-04 PROCEDURE — 3061F NEG MICROALBUMINURIA REV: CPT | Mod: S$GLB,,, | Performed by: PHYSICIAN ASSISTANT

## 2021-10-04 PROCEDURE — 99213 PR OFFICE/OUTPT VISIT, EST, LEVL III, 20-29 MIN: ICD-10-PCS | Mod: S$GLB,,, | Performed by: PHYSICIAN ASSISTANT

## 2021-10-04 PROCEDURE — 1160F RVW MEDS BY RX/DR IN RCRD: CPT | Mod: S$GLB,,, | Performed by: PHYSICIAN ASSISTANT

## 2021-10-04 PROCEDURE — 3066F PR DOCUMENTATION OF TREATMENT FOR NEPHROPATHY: ICD-10-PCS | Mod: S$GLB,,, | Performed by: PHYSICIAN ASSISTANT

## 2021-10-04 PROCEDURE — 99213 OFFICE O/P EST LOW 20 MIN: CPT | Mod: S$GLB,,, | Performed by: PHYSICIAN ASSISTANT

## 2021-10-04 PROCEDURE — 3078F PR MOST RECENT DIASTOLIC BLOOD PRESSURE < 80 MM HG: ICD-10-PCS | Mod: S$GLB,,, | Performed by: PHYSICIAN ASSISTANT

## 2021-10-04 PROCEDURE — 3061F PR NEG MICROALBUMINURIA RESULT DOCUMENTED/REVIEW: ICD-10-PCS | Mod: S$GLB,,, | Performed by: PHYSICIAN ASSISTANT

## 2021-10-04 PROCEDURE — 3074F SYST BP LT 130 MM HG: CPT | Mod: S$GLB,,, | Performed by: PHYSICIAN ASSISTANT

## 2021-10-04 PROCEDURE — 3074F PR MOST RECENT SYSTOLIC BLOOD PRESSURE < 130 MM HG: ICD-10-PCS | Mod: S$GLB,,, | Performed by: PHYSICIAN ASSISTANT

## 2021-10-04 PROCEDURE — 3066F NEPHROPATHY DOC TX: CPT | Mod: S$GLB,,, | Performed by: PHYSICIAN ASSISTANT

## 2021-10-04 RX ORDER — METFORMIN HYDROCHLORIDE 1000 MG/1
1000 TABLET ORAL 2 TIMES DAILY WITH MEALS
Qty: 180 TABLET | Refills: 3 | Status: SHIPPED | OUTPATIENT
Start: 2021-10-04 | End: 2022-01-18

## 2021-10-05 ENCOUNTER — TELEPHONE (OUTPATIENT)
Dept: FAMILY MEDICINE | Facility: CLINIC | Age: 61
End: 2021-10-05

## 2021-10-05 DIAGNOSIS — E11.9 TYPE 2 DIABETES MELLITUS WITHOUT COMPLICATION, WITHOUT LONG-TERM CURRENT USE OF INSULIN: Primary | ICD-10-CM

## 2021-10-05 LAB
ALBUMIN SERPL-MCNC: 4 G/DL (ref 3.6–5.1)
ALBUMIN/GLOB SERPL: 1.6 (CALC) (ref 1–2.5)
ALP SERPL-CCNC: 57 U/L (ref 37–153)
ALT SERPL-CCNC: 23 U/L (ref 6–29)
AST SERPL-CCNC: 16 U/L (ref 10–35)
BILIRUB SERPL-MCNC: 0.7 MG/DL (ref 0.2–1.2)
BUN SERPL-MCNC: 15 MG/DL (ref 7–25)
BUN/CREAT SERPL: ABNORMAL (CALC) (ref 6–22)
CALCIUM SERPL-MCNC: 9.3 MG/DL (ref 8.6–10.4)
CHLORIDE SERPL-SCNC: 104 MMOL/L (ref 98–110)
CHOLEST SERPL-MCNC: 221 MG/DL
CHOLEST/HDLC SERPL: 4.2 (CALC)
CO2 SERPL-SCNC: 27 MMOL/L (ref 20–32)
CREAT SERPL-MCNC: 0.92 MG/DL (ref 0.5–0.99)
GLOBULIN SER CALC-MCNC: 2.5 G/DL (CALC) (ref 1.9–3.7)
GLUCOSE SERPL-MCNC: 266 MG/DL (ref 65–99)
HBA1C MFR BLD: 10.5 % OF TOTAL HGB
HDLC SERPL-MCNC: 53 MG/DL
LDLC SERPL CALC-MCNC: 132 MG/DL (CALC)
NONHDLC SERPL-MCNC: 168 MG/DL (CALC)
POTASSIUM SERPL-SCNC: 4.3 MMOL/L (ref 3.5–5.3)
PROT SERPL-MCNC: 6.5 G/DL (ref 6.1–8.1)
SODIUM SERPL-SCNC: 140 MMOL/L (ref 135–146)
TRIGL SERPL-MCNC: 226 MG/DL

## 2021-10-05 RX ORDER — SEMAGLUTIDE 1.34 MG/ML
0.5 INJECTION, SOLUTION SUBCUTANEOUS
Qty: 1 PEN | Refills: 11 | Status: ON HOLD | OUTPATIENT
Start: 2021-10-05 | End: 2021-10-18 | Stop reason: CLARIF

## 2021-10-06 ENCOUNTER — TELEPHONE (OUTPATIENT)
Dept: FAMILY MEDICINE | Facility: CLINIC | Age: 61
End: 2021-10-06

## 2021-10-11 ENCOUNTER — TELEPHONE (OUTPATIENT)
Dept: FAMILY MEDICINE | Facility: CLINIC | Age: 61
End: 2021-10-11

## 2021-10-17 PROBLEM — R74.8 ELEVATED LIPASE: Status: ACTIVE | Noted: 2021-10-17

## 2021-10-17 PROBLEM — N23 RENAL COLIC ON LEFT SIDE: Status: ACTIVE | Noted: 2021-10-17

## 2021-10-18 ENCOUNTER — TELEPHONE (OUTPATIENT)
Dept: UROLOGY | Facility: CLINIC | Age: 61
End: 2021-10-18

## 2021-10-19 ENCOUNTER — PATIENT MESSAGE (OUTPATIENT)
Dept: FAMILY MEDICINE | Facility: CLINIC | Age: 61
End: 2021-10-19
Payer: COMMERCIAL

## 2021-10-19 ENCOUNTER — TELEPHONE (OUTPATIENT)
Dept: UROLOGY | Facility: CLINIC | Age: 61
End: 2021-10-19

## 2021-10-19 ENCOUNTER — PATIENT MESSAGE (OUTPATIENT)
Dept: FAMILY MEDICINE | Facility: CLINIC | Age: 61
End: 2021-10-19

## 2021-10-19 DIAGNOSIS — K85.90 ACUTE PANCREATITIS, UNSPECIFIED COMPLICATION STATUS, UNSPECIFIED PANCREATITIS TYPE: ICD-10-CM

## 2021-10-19 DIAGNOSIS — N23 RENAL COLIC ON LEFT SIDE: Primary | ICD-10-CM

## 2021-10-20 ENCOUNTER — PATIENT MESSAGE (OUTPATIENT)
Dept: FAMILY MEDICINE | Facility: CLINIC | Age: 61
End: 2021-10-20

## 2021-10-20 ENCOUNTER — TELEPHONE (OUTPATIENT)
Dept: UROLOGY | Facility: CLINIC | Age: 61
End: 2021-10-20

## 2021-10-20 DIAGNOSIS — N20.0 KIDNEY STONE: Primary | ICD-10-CM

## 2021-10-21 LAB
ALBUMIN SERPL-MCNC: 4.1 G/DL (ref 3.6–5.1)
ALBUMIN/GLOB SERPL: 1.5 (CALC) (ref 1–2.5)
ALP SERPL-CCNC: 60 U/L (ref 37–153)
ALT SERPL-CCNC: 31 U/L (ref 6–29)
AMYLASE SERPL-CCNC: 33 U/L (ref 21–101)
AST SERPL-CCNC: 21 U/L (ref 10–35)
BILIRUB SERPL-MCNC: 0.5 MG/DL (ref 0.2–1.2)
BUN SERPL-MCNC: 14 MG/DL (ref 7–25)
BUN/CREAT SERPL: ABNORMAL (CALC) (ref 6–22)
CALCIUM SERPL-MCNC: 10.1 MG/DL (ref 8.6–10.4)
CHLORIDE SERPL-SCNC: 104 MMOL/L (ref 98–110)
CO2 SERPL-SCNC: 26 MMOL/L (ref 20–32)
CREAT SERPL-MCNC: 0.94 MG/DL (ref 0.5–0.99)
GLOBULIN SER CALC-MCNC: 2.7 G/DL (CALC) (ref 1.9–3.7)
GLUCOSE SERPL-MCNC: 75 MG/DL (ref 65–99)
LIPASE SERPL-CCNC: 27 U/L (ref 7–60)
POTASSIUM SERPL-SCNC: 4.2 MMOL/L (ref 3.5–5.3)
PROT SERPL-MCNC: 6.8 G/DL (ref 6.1–8.1)
SODIUM SERPL-SCNC: 141 MMOL/L (ref 135–146)

## 2021-10-25 PROBLEM — N20.0 KIDNEY STONE: Status: ACTIVE | Noted: 2021-10-25

## 2021-10-27 ENCOUNTER — TELEPHONE (OUTPATIENT)
Dept: UROLOGY | Facility: CLINIC | Age: 61
End: 2021-10-27
Payer: COMMERCIAL

## 2021-11-09 ENCOUNTER — PATIENT MESSAGE (OUTPATIENT)
Dept: FAMILY MEDICINE | Facility: CLINIC | Age: 61
End: 2021-11-09
Payer: COMMERCIAL

## 2021-12-01 ENCOUNTER — TELEPHONE (OUTPATIENT)
Dept: FAMILY MEDICINE | Facility: CLINIC | Age: 61
End: 2021-12-01
Payer: COMMERCIAL

## 2021-12-01 DIAGNOSIS — E11.9 TYPE 2 DIABETES MELLITUS WITHOUT COMPLICATION, WITHOUT LONG-TERM CURRENT USE OF INSULIN: Primary | ICD-10-CM

## 2021-12-02 LAB — HBA1C MFR BLD: 7.9 % OF TOTAL HGB

## 2022-01-18 ENCOUNTER — TELEPHONE (OUTPATIENT)
Dept: FAMILY MEDICINE | Facility: CLINIC | Age: 62
End: 2022-01-18

## 2022-01-18 ENCOUNTER — OFFICE VISIT (OUTPATIENT)
Dept: FAMILY MEDICINE | Facility: CLINIC | Age: 62
End: 2022-01-18
Payer: COMMERCIAL

## 2022-01-18 VITALS
WEIGHT: 208 LBS | HEIGHT: 67 IN | SYSTOLIC BLOOD PRESSURE: 120 MMHG | OXYGEN SATURATION: 97 % | HEART RATE: 80 BPM | BODY MASS INDEX: 32.65 KG/M2 | DIASTOLIC BLOOD PRESSURE: 78 MMHG

## 2022-01-18 DIAGNOSIS — Z78.9 STATIN INTOLERANCE: ICD-10-CM

## 2022-01-18 DIAGNOSIS — Z12.11 SCREENING FOR COLON CANCER: ICD-10-CM

## 2022-01-18 DIAGNOSIS — E78.5 HYPERLIPIDEMIA, UNSPECIFIED HYPERLIPIDEMIA TYPE: ICD-10-CM

## 2022-01-18 DIAGNOSIS — Z12.31 OTHER SCREENING MAMMOGRAM: ICD-10-CM

## 2022-01-18 DIAGNOSIS — E11.9 TYPE 2 DIABETES MELLITUS WITHOUT COMPLICATION, WITHOUT LONG-TERM CURRENT USE OF INSULIN: Primary | ICD-10-CM

## 2022-01-18 PROCEDURE — 3008F BODY MASS INDEX DOCD: CPT | Mod: S$GLB,,, | Performed by: PHYSICIAN ASSISTANT

## 2022-01-18 PROCEDURE — 3074F SYST BP LT 130 MM HG: CPT | Mod: S$GLB,,, | Performed by: PHYSICIAN ASSISTANT

## 2022-01-18 PROCEDURE — 3078F DIAST BP <80 MM HG: CPT | Mod: S$GLB,,, | Performed by: PHYSICIAN ASSISTANT

## 2022-01-18 PROCEDURE — 3008F PR BODY MASS INDEX (BMI) DOCUMENTED: ICD-10-PCS | Mod: S$GLB,,, | Performed by: PHYSICIAN ASSISTANT

## 2022-01-18 PROCEDURE — 3051F HG A1C>EQUAL 7.0%<8.0%: CPT | Mod: S$GLB,,, | Performed by: PHYSICIAN ASSISTANT

## 2022-01-18 PROCEDURE — 3078F PR MOST RECENT DIASTOLIC BLOOD PRESSURE < 80 MM HG: ICD-10-PCS | Mod: S$GLB,,, | Performed by: PHYSICIAN ASSISTANT

## 2022-01-18 PROCEDURE — 99214 PR OFFICE/OUTPT VISIT, EST, LEVL IV, 30-39 MIN: ICD-10-PCS | Mod: S$GLB,,, | Performed by: PHYSICIAN ASSISTANT

## 2022-01-18 PROCEDURE — 3074F PR MOST RECENT SYSTOLIC BLOOD PRESSURE < 130 MM HG: ICD-10-PCS | Mod: S$GLB,,, | Performed by: PHYSICIAN ASSISTANT

## 2022-01-18 PROCEDURE — 3051F PR MOST RECENT HEMOGLOBIN A1C LEVEL 7.0 - < 8.0%: ICD-10-PCS | Mod: S$GLB,,, | Performed by: PHYSICIAN ASSISTANT

## 2022-01-18 PROCEDURE — 99214 OFFICE O/P EST MOD 30 MIN: CPT | Mod: S$GLB,,, | Performed by: PHYSICIAN ASSISTANT

## 2022-01-18 RX ORDER — EZETIMIBE 10 MG/1
10 TABLET ORAL DAILY
Qty: 90 TABLET | Refills: 3 | Status: SHIPPED | OUTPATIENT
Start: 2022-01-18 | End: 2023-05-01 | Stop reason: SDUPTHER

## 2022-01-18 RX ORDER — METFORMIN HYDROCHLORIDE 1000 MG/1
1000 TABLET, FILM COATED, EXTENDED RELEASE ORAL 2 TIMES DAILY WITH MEALS
Qty: 180 TABLET | Refills: 1 | Status: SHIPPED | OUTPATIENT
Start: 2022-01-18 | End: 2022-02-04 | Stop reason: SDUPTHER

## 2022-01-18 RX ORDER — GLIPIZIDE 10 MG/1
10 TABLET ORAL
Qty: 90 TABLET | Refills: 1 | Status: SHIPPED | OUTPATIENT
Start: 2022-01-18 | End: 2023-05-01 | Stop reason: SDUPTHER

## 2022-01-18 RX ORDER — METFORMIN HYDROCHLORIDE 1000 MG/1
1000 TABLET ORAL 2 TIMES DAILY WITH MEALS
Qty: 180 TABLET | Refills: 3 | Status: CANCELLED | OUTPATIENT
Start: 2022-01-18 | End: 2023-01-18

## 2022-01-18 NOTE — TELEPHONE ENCOUNTER
----- Message from Ceci Calles sent at 1/18/2022  2:23 PM CST -----  PA on Metformin ER 1000 mg modified tab?

## 2022-01-18 NOTE — PROGRESS NOTES
SUBJECTIVE:    Patient ID: Nubia Flaherty is a 62 y.o. female.    Chief Complaint: Follow-up (3 mo f/u for diabetes//no med bottles//declined colonoscopy//mammogram ordered//last eye exam last week//tc)    This is a 62-year-old female who presents today for three-month diabetic follow-up.  A1c was improved to 7.9% in early December.  This was down from 9.7% in October.  She maintains on metformin/glipizide. Using her mom's trulicity now that has really helped to get her BS better managed. 1.5mg. She does report some diarrhea with the metformin. Interested in a different formulation. Had eye exam completed last month. Retina looked good. Saw Dr. Narcisa Varghese.      Telephone on 12/01/2021   Component Date Value Ref Range Status    Hemoglobin A1C 12/01/2021 7.9* <5.7 % of total Hgb Final   Admission on 10/25/2021, Discharged on 10/25/2021   Component Date Value Ref Range Status    Hemoglobin 10/25/2021 14.2  12.0 - 16.0 g/dL Final    Hemoglobin A1C 10/25/2021 9.7* 0.0 - 5.6 % Final    Estimated Avg Glucose 10/25/2021 232* 68 - 131 mg/dL Final    POCT Glucose 10/25/2021 141* 70 - 110 mg/dL Final    Calculi Mass 10/25/2021 40  mg Final    Calculi Number 10/25/2021 2   Final    Calculi Size 10/25/2021 1 to 4  mm Final    Calculi Description 10/25/2021 See Note   Final    Calculi Composition 10/25/2021 See Note   Final    POCT Glucose 10/25/2021 111* 70 - 110 mg/dL Final   Patient Message on 10/19/2021   Component Date Value Ref Range Status    Lipase 10/20/2021 27  7 - 60 U/L Final    Amylase 10/20/2021 33  21 - 101 U/L Final    Glucose 10/20/2021 75  65 - 99 mg/dL Final    BUN 10/20/2021 14  7 - 25 mg/dL Final    Creatinine 10/20/2021 0.94  0.50 - 0.99 mg/dL Final    eGFR if non  10/20/2021 65  > OR = 60 mL/min/1.73m2 Final    eGFR if  10/20/2021 75  > OR = 60 mL/min/1.73m2 Final    BUN/Creatinine Ratio 10/20/2021 NOT APPLICABLE  6 - 22 (calc) Final    Sodium  10/20/2021 141  135 - 146 mmol/L Final    Potassium 10/20/2021 4.2  3.5 - 5.3 mmol/L Final    Chloride 10/20/2021 104  98 - 110 mmol/L Final    CO2 10/20/2021 26  20 - 32 mmol/L Final    Calcium 10/20/2021 10.1  8.6 - 10.4 mg/dL Final    Total Protein 10/20/2021 6.8  6.1 - 8.1 g/dL Final    Albumin 10/20/2021 4.1  3.6 - 5.1 g/dL Final    Globulin, Total 10/20/2021 2.7  1.9 - 3.7 g/dL (calc) Final    Albumin/Globulin Ratio 10/20/2021 1.5  1.0 - 2.5 (calc) Final    Total Bilirubin 10/20/2021 0.5  0.2 - 1.2 mg/dL Final    Alkaline Phosphatase 10/20/2021 60  37 - 153 U/L Final    AST 10/20/2021 21  10 - 35 U/L Final    ALT 10/20/2021 31* 6 - 29 U/L Final   Admission on 10/17/2021, Discharged on 10/18/2021   Component Date Value Ref Range Status    WBC 10/17/2021 6.98  3.90 - 12.70 K/uL Final    RBC 10/17/2021 4.89  4.00 - 5.40 M/uL Final    Hemoglobin 10/17/2021 14.7  12.0 - 16.0 g/dL Final    Hematocrit 10/17/2021 43.0  37.0 - 48.5 % Final    MCV 10/17/2021 88  82 - 98 fL Final    MCH 10/17/2021 30.1  27.0 - 31.0 pg Final    MCHC 10/17/2021 34.2  32.0 - 36.0 g/dL Final    RDW 10/17/2021 13.3  11.5 - 14.5 % Final    Platelets 10/17/2021 273  150 - 450 K/uL Final    MPV 10/17/2021 9.5  9.2 - 12.9 fL Final    Immature Granulocytes 10/17/2021 0.1  0.0 - 0.5 % Final    Gran # (ANC) 10/17/2021 3.4  1.8 - 7.7 K/uL Final    Immature Grans (Abs) 10/17/2021 0.01  0.00 - 0.04 K/uL Final    Lymph # 10/17/2021 2.8  1.0 - 4.8 K/uL Final    Mono # 10/17/2021 0.6  0.3 - 1.0 K/uL Final    Eos # 10/17/2021 0.2  0.0 - 0.5 K/uL Final    Baso # 10/17/2021 0.04  0.00 - 0.20 K/uL Final    nRBC 10/17/2021 0  0 /100 WBC Final    Gran % 10/17/2021 48.7  38.0 - 73.0 % Final    Lymph % 10/17/2021 40.0  18.0 - 48.0 % Final    Mono % 10/17/2021 8.0  4.0 - 15.0 % Final    Eosinophil % 10/17/2021 2.6  0.0 - 8.0 % Final    Basophil % 10/17/2021 0.6  0.0 - 1.9 % Final    Differential Method 10/17/2021 Automated    Final    Sodium 10/17/2021 139  136 - 145 mmol/L Final    Potassium 10/17/2021 3.6  3.5 - 5.1 mmol/L Final    Chloride 10/17/2021 104  95 - 110 mmol/L Final    CO2 10/17/2021 24  22 - 31 mmol/L Final    Glucose 10/17/2021 244* 70 - 110 mg/dL Final    BUN 10/17/2021 15  7 - 18 mg/dL Final    Creatinine 10/17/2021 1.06  0.50 - 1.40 mg/dL Final    Calcium 10/17/2021 9.8  8.4 - 10.2 mg/dL Final    Total Protein 10/17/2021 7.5  6.0 - 8.4 g/dL Final    Albumin 10/17/2021 4.5  3.5 - 5.2 g/dL Final    Total Bilirubin 10/17/2021 0.4  0.2 - 1.3 mg/dL Final    Alkaline Phosphatase 10/17/2021 72  38 - 145 U/L Final    AST 10/17/2021 54* 14 - 36 U/L Final    ALT 10/17/2021 41* 0 - 35 U/L Final    Anion Gap 10/17/2021 11  8 - 16 mmol/L Final    eGFR if African American 10/17/2021 >60  >60 mL/min/1.73 m^2 Final    eGFR if non  10/17/2021 56* >60 mL/min/1.73 m^2 Final    Specimen UA 10/17/2021 Urine, Clean Catch   Final    Color, UA 10/17/2021 Yellow  Yellow, Straw, Parul Final    Appearance, UA 10/17/2021 Cloudy* Clear Final    pH, UA 10/17/2021 5.5  5.0 - 8.0 Final    Specific Gravity, UA 10/17/2021 >=1.030* 1.005 - 1.030 Final    Protein, UA 10/17/2021 Negative  Negative Final    Glucose, UA 10/17/2021 2+* Negative Final    Ketones, UA 10/17/2021 2+* Negative Final    Bilirubin (UA) 10/17/2021 Negative  Negative Final    Occult Blood UA 10/17/2021 3+* Negative Final    Nitrite, UA 10/17/2021 Negative  Negative Final    Urobilinogen, UA 10/17/2021 0.2  <2.0 EU/dL Final    Leukocytes, UA 10/17/2021 1+* Negative Final    Troponin I 10/17/2021 <0.012  0.012 - 0.034 ng/mL Final    Lipase Result 10/17/2021 918* 23 - 300 U/L Final    Cholesterol 10/17/2021 211* 120 - 199 mg/dL Final    Triglycerides 10/17/2021 197* 30 - 150 mg/dL Final    HDL 10/17/2021 45  40 - 75 mg/dL Final    LDL Cholesterol 10/17/2021 126.6  63.0 - 159.0 mg/dL Final    HDL/Cholesterol Ratio 10/17/2021 21.3  20.0  - 50.0 % Final    Total Cholesterol/HDL Ratio 10/17/2021 4.7  2.0 - 5.0 Final    Non-HDL Cholesterol 10/17/2021 166  mg/dL Final    RBC, UA 10/17/2021 29* 0 - 4 /hpf Final    Squam Epithel, UA 10/17/2021 23  /hpf Final    WBC, UA 10/17/2021 16* 0 - 5 /hpf Final    Hyaline Casts, UA 10/17/2021 3* 0 - 1 /lpf Final    Bacteria 10/17/2021 Few* Negative /hpf Final    RBC, UA 10/17/2021 29* 0 - 4 /hpf Final    WBC, UA 10/17/2021 16* 0 - 5 /hpf Final    Bacteria 10/17/2021 Few* Negative /hpf Final    Squam Epithel, UA 10/17/2021 23  /hpf Final    Hyaline Casts, UA 10/17/2021 3* 0 - 1 /lpf Final    Microscopic Comment 10/17/2021 SEE COMMENT   Final    SARS-CoV-2 RNA, Amplification, Qual 10/17/2021 Negative  Negative Final    Urine Culture, Routine 10/17/2021 No growth   Final    POCT Glucose 10/17/2021 278* 70 - 110 mg/dL Final    POCT Glucose 10/17/2021 223* 70 - 110 mg/dL Final    POCT Glucose 10/17/2021 184* 70 - 110 mg/dL Final    POCT Glucose 10/18/2021 168* 70 - 110 mg/dL Final   Telephone on 09/30/2021   Component Date Value Ref Range Status    Glucose 10/04/2021 266* 65 - 99 mg/dL Final    BUN 10/04/2021 15  7 - 25 mg/dL Final    Creatinine 10/04/2021 0.92  0.50 - 0.99 mg/dL Final    eGFR if non  10/04/2021 66  > OR = 60 mL/min/1.73m2 Final    eGFR if  10/04/2021 77  > OR = 60 mL/min/1.73m2 Final    BUN/Creatinine Ratio 10/04/2021 NOT APPLICABLE  6 - 22 (calc) Final    Sodium 10/04/2021 140  135 - 146 mmol/L Final    Potassium 10/04/2021 4.3  3.5 - 5.3 mmol/L Final    Chloride 10/04/2021 104  98 - 110 mmol/L Final    CO2 10/04/2021 27  20 - 32 mmol/L Final    Calcium 10/04/2021 9.3  8.6 - 10.4 mg/dL Final    Total Protein 10/04/2021 6.5  6.1 - 8.1 g/dL Final    Albumin 10/04/2021 4.0  3.6 - 5.1 g/dL Final    Globulin, Total 10/04/2021 2.5  1.9 - 3.7 g/dL (calc) Final    Albumin/Globulin Ratio 10/04/2021 1.6  1.0 - 2.5 (calc) Final    Total Bilirubin  10/04/2021 0.7  0.2 - 1.2 mg/dL Final    Alkaline Phosphatase 10/04/2021 57  37 - 153 U/L Final    AST 10/04/2021 16  10 - 35 U/L Final    ALT 10/04/2021 23  6 - 29 U/L Final    Cholesterol 10/04/2021 221* <200 mg/dL Final    HDL 10/04/2021 53  > OR = 50 mg/dL Final    Triglycerides 10/04/2021 226* <150 mg/dL Final    LDL Cholesterol 10/04/2021 132* mg/dL (calc) Final    HDL/Cholesterol Ratio 10/04/2021 4.2  <5.0 (calc) Final    Non HDL Chol. (LDL+VLDL) 10/04/2021 168* <130 mg/dL (calc) Final    Hemoglobin A1C 10/04/2021 10.5* <5.7 % of total Hgb Final       Past Medical History:   Diagnosis Date    Diabetes mellitus     Hiatal hernia     Hypercholesteremia     Kidney stones     Kidney stones 11/01/2021    removed kidney stone left side    Migraine     Retina disorder     left eye cysts     Past Surgical History:   Procedure Laterality Date    CYSTOSCOPY W/ URETERAL STENT PLACEMENT Left 10/25/2021    Procedure: CYSTOSCOPY, WITH URETERAL STENT INSERTION;  Surgeon: Yonatan Max MD;  Location: [facility] OR;  Service: Urology;  Laterality: Left;    CYSTOURETEROSCOPY WITH RETROGRADE PYELOGRAPHY AND INSERTION OF STENT INTO URETER Left 10/17/2021    Procedure: CYSTOURETEROSCOPY, WITH RETROGRADE PYELOGRAM AND URETERAL STENT INSERTION;  Surgeon: Yonatan Max MD;  Location: [facility] OR;  Service: Urology;  Laterality: Left;    FOOT SURGERY Left 2015    bunion     laryngocele  06/2009    TONSILLECTOMY, ADENOIDECTOMY      URETEROSCOPY Left 10/25/2021    Procedure: URETEROSCOPY;  Surgeon: Yonatan Max MD;  Location: [facility] OR;  Service: Urology;  Laterality: Left;     Family History   Problem Relation Age of Onset    Heart disease Father     Diabetes Father     COPD Father     Heart disease Mother     Diabetes Mother     COPD Mother     Kidney disease Mother     Arthritis Mother     Breast cancer Maternal Aunt     Heart disease Brother     Heart disease Brother     Drug abuse Brother      Ovarian cancer Neg Hx        Marital Status:   Alcohol History:  reports current alcohol use.  Tobacco History:  reports that she has never smoked. She has never used smokeless tobacco.  Drug History:  reports no history of drug use.    Review of patient's allergies indicates:   Allergen Reactions    Codeine Tinitus    Morphine Other (See Comments)     Severe migrain       Current Outpatient Medications:     ascorbic acid, vitamin C, (VITAMIN C) 500 MG tablet, Take 500 mg by mouth once daily., Disp: , Rfl:     CALCIUM-MAGNESIUM-ZINC ORAL, Take 1 tablet by mouth once daily., Disp: , Rfl:     coenzyme Q10 100 mg capsule, Take 100 mg by mouth once daily., Disp: , Rfl:     eletriptan (RELPAX) 40 MG tablet, Take 40 mg by mouth once as needed (migraine. may repeat dose in 2 hours if no relief.). , Disp: , Rfl:     ezetimibe (ZETIA) 10 mg tablet, Take 1 tablet (10 mg total) by mouth once daily., Disp: 90 tablet, Rfl: 3    glipiZIDE (GLUCOTROL) 10 MG tablet, Take 1 tablet (10 mg total) by mouth 2 (two) times daily before meals., Disp: 90 tablet, Rfl: 1    metFORMIN (GLUMETZA) 1000 MG (MOD) 24hr tablet, Take 1 tablet (1,000 mg total) by mouth 2 (two) times daily with meals., Disp: 180 tablet, Rfl: 1    multivitamin (THERAGRAN) per tablet, Take 1 tablet by mouth once daily., Disp: , Rfl:     turmeric/turmeric ext/pepr ext (TURMERIC-TURMERIC EXT-PEPPER) 500-3 mg Cap, Take 1 capsule by mouth once daily., Disp: , Rfl:     vit A/vit C/vit E/zinc/copper (ICAPS AREDS ORAL), Take by mouth once daily., Disp: , Rfl:   No current facility-administered medications for this visit.    Facility-Administered Medications Ordered in Other Visits:     HYDROmorphone injection 0.5 mg, 0.5 mg, Intravenous, Q5 Min PRN, Jacqueline Volpi-Abadie, MD    lactated ringers infusion, , Intravenous, Continuous, Crystal Mayes MD, Stopped at 10/25/21 1658    LIDOcaine (PF) 10 mg/ml (1%) injection 10 mg, 1 mL, Intradermal, Once,  "Crystal Mayes MD    lorazepam injection 0.25 mg, 0.25 mg, Intravenous, Q15 Min PRN, Jacqueline Volpi-Abadie, MD    ondansetron injection 4 mg, 4 mg, Intravenous, Daily PRN, Jacqueline Volpi-Abadie, MD    oxyCODONE-acetaminophen 5-325 mg per tablet 1 tablet, 1 tablet, Oral, Q3H PRN, Jacqueline Volpi-Abadie, MD    sodium chloride 0.9% flush 3 mL, 3 mL, Intravenous, PRN, Jacqueline Volpi-Abadie, MD    Review of Systems   Constitutional: Negative for appetite change, chills, fatigue, fever and unexpected weight change.   HENT: Negative for congestion.    Respiratory: Negative for cough, chest tightness and shortness of breath.    Cardiovascular: Negative for chest pain and palpitations.   Gastrointestinal: Negative for abdominal distention and abdominal pain.   Endocrine: Negative for cold intolerance and heat intolerance.   Genitourinary: Negative for difficulty urinating and dysuria.   Musculoskeletal: Negative for arthralgias and back pain.   Neurological: Negative for dizziness, weakness and headaches.          Objective:      Vitals:    01/18/22 1101   BP: 120/78   Pulse: 80   SpO2: 97%   Weight: 94.3 kg (208 lb)   Height: 5' 7" (1.702 m)     Physical Exam  Constitutional:       General: She is not in acute distress.     Appearance: She is well-developed.   HENT:      Head: Normocephalic and atraumatic.   Eyes:      Conjunctiva/sclera: Conjunctivae normal.      Pupils: Pupils are equal, round, and reactive to light.   Neck:      Thyroid: No thyromegaly.   Cardiovascular:      Rate and Rhythm: Normal rate and regular rhythm.      Pulses:           Dorsalis pedis pulses are 2+ on the right side and 2+ on the left side.        Posterior tibial pulses are 2+ on the right side and 2+ on the left side.      Heart sounds: Normal heart sounds.   Pulmonary:      Effort: Pulmonary effort is normal.      Breath sounds: Normal breath sounds.   Abdominal:      General: Bowel sounds are normal. There is no distension.    "   Palpations: Abdomen is soft.      Tenderness: There is no abdominal tenderness.   Musculoskeletal:         General: Normal range of motion.      Cervical back: Normal range of motion and neck supple.   Feet:      Right foot:      Protective Sensation: 2 sites tested. 2 sites sensed.      Left foot:      Protective Sensation: 2 sites tested. 2 sites sensed.   Skin:     General: Skin is warm and dry.      Findings: No erythema.   Neurological:      Mental Status: She is alert and oriented to person, place, and time.      Cranial Nerves: No cranial nerve deficit.           Assessment:       1. Type 2 diabetes mellitus without complication, without long-term current use of insulin    2. Other screening mammogram    3. Hyperlipidemia, unspecified hyperlipidemia type    4. Screening for colon cancer    5. Statin intolerance         Plan:       Type 2 diabetes mellitus without complication, without long-term current use of insulin  Comments:  I do suspect that her a1c remains well managed. A1c last month was 7.9% Plan to change to metformin ER. f/u in 3 mos.  Orders:  -     glipiZIDE (GLUCOTROL) 10 MG tablet; Take 1 tablet (10 mg total) by mouth 2 (two) times daily before meals.  Dispense: 90 tablet; Refill: 1  -     metFORMIN (GLUMETZA) 1000 MG (MOD) 24hr tablet; Take 1 tablet (1,000 mg total) by mouth 2 (two) times daily with meals.  Dispense: 180 tablet; Refill: 1  -     Foot Exam Performed    Other screening mammogram  -     Mammo Digital Screening Bilat; Future; Expected date: 01/18/2022    Hyperlipidemia, unspecified hyperlipidemia type  -     ezetimibe (ZETIA) 10 mg tablet; Take 1 tablet (10 mg total) by mouth once daily.  Dispense: 90 tablet; Refill: 3    Screening for colon cancer  -     Cologuard Screening (Multitarget Stool DNA); Future; Expected date: 01/18/2022    Statin intolerance  Comments:  intolerant of statin. will maintain zetia as prescribed.      Follow up in about 3 months (around 4/18/2022) for  Diabetic Check-Up.        1/18/2022 Robert Suarez PA-C

## 2022-01-18 NOTE — PATIENT INSTRUCTIONS
"Patient Education       Diabetes and Diet   The Basics   Written by the doctors and editors at [facility]   Why is diet important in diabetes? -- Diet is important because it is part of diabetes treatment. Many people need to change what they eat and how much they eat to help treat their diabetes. It is important for people to treat their diabetes so that they:  · Keep their blood sugar at or near a normal level  · Prevent long-term problems, such as heart or kidney problems, that can happen in people with diabetes  Changing your diet can also help treat obesity, high blood pressure, and high cholesterol. These conditions can affect people with diabetes and can lead to future problems, such as heart attacks or strokes.  Who will work with me to change my diet? -- Your doctor or nurse will work with you to make a food plan to change your diet. They might also recommend that you work with a "dietitian." A dietitian is an expert on food and eating.  Do I need to eat at the same times every day? -- When and how often you should eat depends, in part, on the diabetes medicines you take. For example:  · People who take about the same amount of insulin at the same time each day (called a "fixed regimen") should eat meals at the same times. This is also true for people who take pills that increase insulin levels, such as sulfonylureas. Eating meals at the same time every day helps prevent low blood sugar.  · People who adjust the dose and timing of their insulin each day (called a "flexible regimen") do not always have to eat meals at the same time. That's because they can time their insulin dose for before they plan to eat, and also adjust the dose for how much they plan to eat.  · People who take medicines that don't usually cause low blood sugar, such as metformin, don't have to eat meals at the same time every day.  What do I need to think about when planning what to eat? -- Our bodies break down the food we eat into small " "pieces called carbohydrates, proteins, and fats.  When planning what to eat, people with diabetes need to think about:  · Carbohydrates (or "carbs") - Carbohydrates, which are sugars that our bodies use for energy, can raise a person's blood sugar level. Your doctor, nurse, or dietitian will tell you how many carbohydrates you should eat at each meal or snack. Foods that have carbohydrates include:  ? Bread, pasta, and rice  ? Vegetables and fruits  ? Dairy foods  ? Foods and drinks with added sugar  It is best to get your carbohydrates from fruits, vegetables, whole grains, and low-fat milk. It is best to avoid drinks with added sugar, like soda, juices, and sports drinks.   · Protein - Your doctor, nurse, or dietitian will tell you how much protein you should eat each day. It is best to eat lean meats, fish, eggs, beans, peas, soy products, nuts, and seeds.  · Fats - The type of fat you eat is more important than the amount of fat. "Saturated" and "trans" fats can increase your risk for heart problems, like a heart attack.  ? Foods that have saturated fats include meat, butter, cheese, and ice cream.  ? Foods that have trans fats include processed food with "partially hydrogenated oils" on the ingredient list. This may include fried foods, store bought cookies, muffins, pies, and cakes.  "Monounsaturated" and "polyunsaturated" fats are better for you. Foods with these types of fat include fish, avocado, olive oil, and nuts.  · Calories - People need to eat a certain amount of calories each day to keep their weight the same. People who are overweight and want to lose weight need to eat fewer calories each day.  · Fiber - Eating foods with a lot of fiber can help control a person's blood sugar level. Foods that have a lot of fiber include apples, green beans, peas, beans, lentils, nuts, oatmeal, and whole grains.  · Salt - People who have high blood pressure should not eat foods that contain a lot of salt (also " called sodium). People with high blood pressure should also eat healthy foods, such as fruits, vegetables, and low-fat dairy foods.  · Alcohol - Having more than 1 drink (for women) or 2 drinks (for men) a day can raise blood sugar levels. Also, drinks that have fruit juice or soda in them can raise blood sugar levels.  What can I do if I need to lose weight? -- If you need to lose weight, you can:  · Exercise - Try to get at least 30 minutes of physical activity a day, most days of the week. Even gentle exercise, like walking, is good for your health. Some people with diabetes need to change their medicine dose before they exercise. They might also need to check their blood sugar levels before and after exercising.  · Eat fewer calories - Your doctor, nurse, or dietitian can tell you how many calories you should eat each day in order to lose weight.  If you are worried about your weight, size, or shape, talk with your doctor, nurse, or dietitian. They can help you make changes to improve your health.  Can I eat the same foods as my family? -- Yes. You do not need to eat special foods if you have diabetes. You and your family can eat the same foods. Changing your diet is mostly about eating healthy foods and not eating too much.  What are the other parts of diabetes treatment? -- Besides changing your diet, the other parts of diabetes treatment are:  · Exercise  · Medicines  Some people with diabetes need to learn how to match their diet and exercise with their medicine dose. For example, people who use insulin might need to choose the dose of insulin they give themselves. To choose their dose, they need to think about:  · What they plan to eat at the next meal  · How much exercise they plan to do  · What their blood sugar level is  If the diet and exercise do not match the medicine dose, a person's blood sugar level can get too low or too high. Blood sugar levels that are too low or too high can cause  problems.  All topics are updated as new evidence becomes available and our peer review process is complete.  This topic retrieved from [employer] on: Sep 21, 2021.  Topic 00097 Version 7.0  Release: 29.4.2 - C29.263  © 2021 UpToDate, Inc. and/or its affiliates. All rights reserved.  Consumer Information Use and Disclaimer   This information is not specific medical advice and does not replace information you receive from your health care provider. This is only a brief summary of general information. It does NOT include all information about conditions, illnesses, injuries, tests, procedures, treatments, therapies, discharge instructions or life-style choices that may apply to you. You must talk with your health care provider for complete information about your health and treatment options. This information should not be used to decide whether or not to accept your health care provider's advice, instructions or recommendations. Only your health care provider has the knowledge and training to provide advice that is right for you. The use of this information is governed by the [employer] End User License Agreement, available at https://[email or web address]ill/en/solutions/[email or web address]/about/gunnar.The use of [employer] content is governed by the [employer] Terms of Use. ©2021 UpToDate, Inc. All rights reserved.  Copyright   © 2021 UpToDate, Inc. and/or its affiliates. All rights reserved.

## 2022-02-02 ENCOUNTER — HOSPITAL ENCOUNTER (OUTPATIENT)
Dept: RADIOLOGY | Facility: HOSPITAL | Age: 62
Discharge: HOME | End: 2022-02-02
Attending: PHYSICIAN ASSISTANT
Payer: COMMERCIAL

## 2022-02-02 DIAGNOSIS — Z12.31 OTHER SCREENING MAMMOGRAM: ICD-10-CM

## 2022-02-02 PROCEDURE — 77063 BREAST TOMOSYNTHESIS BI: CPT | Mod: TC,PO

## 2022-02-02 PROCEDURE — 77067 SCR MAMMO BI INCL CAD: CPT | Mod: TC,PO

## 2022-02-04 ENCOUNTER — TELEPHONE (OUTPATIENT)
Dept: FAMILY MEDICINE | Facility: CLINIC | Age: 62
End: 2022-02-04
Payer: COMMERCIAL

## 2022-02-04 DIAGNOSIS — E11.9 TYPE 2 DIABETES MELLITUS WITHOUT COMPLICATION, WITHOUT LONG-TERM CURRENT USE OF INSULIN: ICD-10-CM

## 2022-02-04 RX ORDER — METFORMIN HYDROCHLORIDE 500 MG/1
1000 TABLET, FILM COATED, EXTENDED RELEASE ORAL 2 TIMES DAILY WITH MEALS
Qty: 360 TABLET | Refills: 1 | Status: SHIPPED | OUTPATIENT
Start: 2022-02-04 | End: 2022-04-25

## 2022-02-04 NOTE — TELEPHONE ENCOUNTER
----- Message from Ceci Calles sent at 2/4/2022 11:05 AM CST -----  PA on Metformin ER 1000 mg modified tabs qty 180?

## 2022-04-10 NOTE — H&P
"[facility] Medicine  History & Physical  Date of service:11/11/2019 at 0200      Patient Name: Nubia Flaherty  MRN: [id number]  Admission Date: 11/10/2019  Attending Physician: Dara Perez MD   Primary Care Provider: Robert Upton PA-C         Patient information was obtained from patient, past medical records and ER records.     Subjective:     Principal Problem:Chest pain    Chief Complaint:   Chief Complaint   Patient presents with    Chest Pain     pt reports pain that originates at the epigastric and radiates up to her jaw that began at 9pm tonight        HPI: 59 year old with history of diabetes mellitus and hypertension presented to the ED with chest pain.    Location: Mid chest  Quality:  Aching  Radiation:  Neck and back  Onset:  Abrupt   Aggravating factors: None  Alleviating factors: None  Timing: Multiple brief episodes, lasting- 1-2 minutes, starting while at rest, PTA, last night, about 20 episodes in the past 2 hours  Severity: "Intense"  Associated symptoms: Burping/indigestion with no nausea/vomiting, or diaphoresis  Co morbidities:  Hypertension, diabetes mellitus (A1C 7.4 on 10/28/2019)  Other factors:  Denies tobacco, alcohol, or recreational drugs  Family history: Mother diagnosed with CAD in her 60s-60s. She had CABG. Father had a pacemaker. A brother was diagnosed with CAD at age 58. Another brother was diagnosed with CAD at age 55. Another brother was diagnosed with CAD at age 35 - he also had recreational drug use.  Prior diagnostic studies: Per chart review, Negative nuclear stress test 7/2016    Denies fever, chills, cough, shortness of breath, abdominal pain, nausea, vomiting, urinary symptoms.  Denies dyspnea on exertion, edema, weight gain, orthopnea, PND.    In the ED  Vital signs: Stable, afebrile, /71  Labs: Unremarkable  Troponin < 0.030  EKG, personally reviewed, sinus rhythm with PVCs, no ST elevation  CXR, personally reviewed, no obvious " infiltrates or overt heart failure          Past Medical History:   Diagnosis Date    Diabetes mellitus     Hypertension     Kidney stones     Prediabetes        Past Surgical History:   Procedure Laterality Date    FOOT SURGERY Left 2015    bunion     TONSILLECTOMY, ADENOIDECTOMY         Review of patient's allergies indicates:   Allergen Reactions    Codeine Tinitus    Morphine        No current facility-administered medications on file prior to encounter.      Current Outpatient Medications on File Prior to Encounter   Medication Sig    metFORMIN (GLUCOPHAGE) 1000 MG tablet Take 1 tablet (1,000 mg total) by mouth daily with breakfast.    telmisartan (MICARDIS) 20 MG Tab Take 1 tablet (20 mg total) by mouth once daily.     Family History     Problem Relation (Age of Onset)    Breast cancer Maternal Aunt    Diabetes Father, Mother    Heart disease Father, Mother        Tobacco Use    Smoking status: Never Smoker    Smokeless tobacco: Never Used   Substance and Sexual Activity    Alcohol use: No    Drug use: Never    Sexual activity: Yes     Partners: Male     Review of Systems   All other systems reviewed and are negative.    Objective:     Vital Signs (Most Recent):  Temp: 97.7 °F (36.5 °C) (11/10/19 2312)  Pulse: 72 (11/10/19 2312)  Resp: 16 (11/10/19 2312)  BP: (!) 162/71 (11/10/19 2312)  SpO2: 98 % (11/10/19 2312) Vital Signs (24h Range):  Temp:  [97.7 °F (36.5 °C)] 97.7 °F (36.5 °C)  Pulse:  [72] 72  Resp:  [16] 16  SpO2:  [98 %] 98 %  BP: (162)/(71) 162/71     Weight: 90.7 kg (200 lb)  Body mass index is 30.86 kg/m².    Physical Exam   Constitutional: She is oriented to person, place, and time. She appears well-developed and well-nourished.   HENT:   Head: Normocephalic and atraumatic.   Eyes: Conjunctivae and EOM are normal. Right eye exhibits no discharge. Left eye exhibits no discharge. No scleral icterus.   Neck: Normal range of motion. Neck supple. No JVD present.   Cardiovascular: Normal  rate and regular rhythm. Exam reveals no gallop and no friction rub.   No murmur heard.  Pulmonary/Chest: Effort normal. She has no wheezes. She has no rales. She exhibits no tenderness.   Abdominal: Soft.   Genitourinary: Vagina normal.   Musculoskeletal: Normal range of motion. She exhibits no edema or deformity.   Neurological: She is alert and oriented to person, place, and time.   Skin: Skin is warm and dry. Capillary refill takes less than 2 seconds. No erythema. No pallor.   Psychiatric: She has a normal mood and affect.   Vitals reviewed.        CRANIAL NERVES     CN III, IV, VI   Extraocular motions are normal.        Significant Labs:   CBC:   Recent Labs   Lab 11/10/19  2330   WBC 5.54   HGB 13.6   HCT 40.9        CMP:   Recent Labs   Lab 11/10/19  2330      K 3.8      CO2 25   *   BUN 19   CREATININE 0.9   CALCIUM 9.3   PROT 6.9   ALBUMIN 4.0   BILITOT 0.8   ALKPHOS 46*   AST 20   ALT 28   ANIONGAP 10   EGFRNONAA >60.0     Cardiac Markers:   Recent Labs   Lab 11/10/19  2330   BNP 32     Troponin:   Recent Labs   Lab 11/10/19  2330   TROPONINI <0.030     All pertinent labs within the past 24 hours have been reviewed.    Significant Imaging: I have reviewed all pertinent imaging results/findings within the past 24 hours.    Assessment/Plan:     * Chest pain  Normal EKG and troponin  Has risk factors:  Diabetes mellitus, hypertension, family history of CAD  Cardiac monitor for arrhythmia  Trend troponin  Aspirin and nitroglycerin  Nuclear treadmill stress test later today      Essential hypertension  Monitor  Resume home medication, telmisartan      Diabetes mellitus  Hemoglobin A1c 7.4, 2 weeks ago  Monitor blood glucose  Sliding scale insulin low-dose  Hold metformin for possible cardiac workup      VTE Risk Mitigation (From admission, onward)         Ordered     Place sequential compression device  Until discontinued      11/11/19 0219     IP VTE HIGH RISK PATIENT  Once       11/11/19 0219                   ABISAI Hernandez  Department of Hospital Medicine   [facility]   DM (diabetes mellitus)

## 2022-04-12 ENCOUNTER — TELEPHONE (OUTPATIENT)
Dept: FAMILY MEDICINE | Facility: CLINIC | Age: 62
End: 2022-04-12

## 2022-04-12 DIAGNOSIS — Z00.00 ROUTINE MEDICAL EXAM: ICD-10-CM

## 2022-04-12 DIAGNOSIS — I10 ESSENTIAL HYPERTENSION: ICD-10-CM

## 2022-04-12 DIAGNOSIS — E11.9 TYPE 2 DIABETES MELLITUS WITHOUT COMPLICATION, WITHOUT LONG-TERM CURRENT USE OF INSULIN: ICD-10-CM

## 2022-04-12 DIAGNOSIS — Z79.899 ENCOUNTER FOR LONG-TERM (CURRENT) USE OF OTHER MEDICATIONS: Primary | ICD-10-CM

## 2022-04-12 DIAGNOSIS — E78.5 HYPERLIPIDEMIA, UNSPECIFIED HYPERLIPIDEMIA TYPE: ICD-10-CM

## 2022-04-25 ENCOUNTER — OFFICE VISIT (OUTPATIENT)
Dept: FAMILY MEDICINE | Facility: CLINIC | Age: 62
End: 2022-04-25
Payer: COMMERCIAL

## 2022-04-25 VITALS
SYSTOLIC BLOOD PRESSURE: 122 MMHG | HEIGHT: 67 IN | WEIGHT: 208.63 LBS | DIASTOLIC BLOOD PRESSURE: 68 MMHG | BODY MASS INDEX: 32.74 KG/M2 | OXYGEN SATURATION: 97 % | HEART RATE: 82 BPM

## 2022-04-25 DIAGNOSIS — I10 ESSENTIAL HYPERTENSION: ICD-10-CM

## 2022-04-25 DIAGNOSIS — E11.9 TYPE 2 DIABETES MELLITUS WITHOUT COMPLICATION, WITHOUT LONG-TERM CURRENT USE OF INSULIN: Primary | ICD-10-CM

## 2022-04-25 DIAGNOSIS — Z12.11 COLON CANCER SCREENING: ICD-10-CM

## 2022-04-25 DIAGNOSIS — Z12.4 CERVICAL CANCER SCREENING: ICD-10-CM

## 2022-04-25 DIAGNOSIS — G43.909 MIGRAINE WITHOUT STATUS MIGRAINOSUS, NOT INTRACTABLE, UNSPECIFIED MIGRAINE TYPE: ICD-10-CM

## 2022-04-25 PROCEDURE — 3074F SYST BP LT 130 MM HG: CPT | Mod: S$GLB,,, | Performed by: PHYSICIAN ASSISTANT

## 2022-04-25 PROCEDURE — 3074F PR MOST RECENT SYSTOLIC BLOOD PRESSURE < 130 MM HG: ICD-10-PCS | Mod: S$GLB,,, | Performed by: PHYSICIAN ASSISTANT

## 2022-04-25 PROCEDURE — 3008F BODY MASS INDEX DOCD: CPT | Mod: S$GLB,,, | Performed by: PHYSICIAN ASSISTANT

## 2022-04-25 PROCEDURE — 3008F PR BODY MASS INDEX (BMI) DOCUMENTED: ICD-10-PCS | Mod: S$GLB,,, | Performed by: PHYSICIAN ASSISTANT

## 2022-04-25 PROCEDURE — 3078F DIAST BP <80 MM HG: CPT | Mod: S$GLB,,, | Performed by: PHYSICIAN ASSISTANT

## 2022-04-25 PROCEDURE — 3078F PR MOST RECENT DIASTOLIC BLOOD PRESSURE < 80 MM HG: ICD-10-PCS | Mod: S$GLB,,, | Performed by: PHYSICIAN ASSISTANT

## 2022-04-25 PROCEDURE — 99214 OFFICE O/P EST MOD 30 MIN: CPT | Mod: S$GLB,,, | Performed by: PHYSICIAN ASSISTANT

## 2022-04-25 PROCEDURE — 3051F PR MOST RECENT HEMOGLOBIN A1C LEVEL 7.0 - < 8.0%: ICD-10-PCS | Mod: S$GLB,,, | Performed by: PHYSICIAN ASSISTANT

## 2022-04-25 PROCEDURE — 99214 PR OFFICE/OUTPT VISIT, EST, LEVL IV, 30-39 MIN: ICD-10-PCS | Mod: S$GLB,,, | Performed by: PHYSICIAN ASSISTANT

## 2022-04-25 PROCEDURE — 3051F HG A1C>EQUAL 7.0%<8.0%: CPT | Mod: S$GLB,,, | Performed by: PHYSICIAN ASSISTANT

## 2022-04-25 RX ORDER — METFORMIN HYDROCHLORIDE 500 MG/1
1000 TABLET, EXTENDED RELEASE ORAL 2 TIMES DAILY WITH MEALS
Qty: 360 TABLET | Refills: 1 | Status: SHIPPED | OUTPATIENT
Start: 2022-04-25 | End: 2023-08-22 | Stop reason: SDUPTHER

## 2022-04-25 NOTE — PROGRESS NOTES
SUBJECTIVE:    Patient ID: Nubia Flaherty is a 62 y.o. female.    Chief Complaint: Diabetes (No bottles//Pt is here for a f/u appt for her diabetes.//Pt states that she stop taken the Metformin in 01/2022 for gastro issues.//referral for cologuard and cervical cancer screening today.//Pt had eye exam with [facility] in 01/2022 with get most recent office note. JOSE ALBERTO)    This is a 62-year-old female who presents today for regular diabetic checkup.  Previous A1c in December was 7.9% she is also treated for hyperlipidemia and migraine headache.  Today she reports that she has been rather stressed with coming back to work for the past 3 months for her old boss. Tax season stressors. She has been monitoring pressure but not so much the BS. Finished out her mother's trulicity but has not been able to afford GLP-1 through her current insurance.       Telephone on 12/01/2021   Component Date Value Ref Range Status    Hemoglobin A1C 12/01/2021 7.9 (A) <5.7 % of total Hgb Final       Past Medical History:   Diagnosis Date    Diabetes mellitus     Hiatal hernia     Hypercholesteremia     Kidney stones     Kidney stones 11/01/2021    removed kidney stone left side    Migraine     Retina disorder     left eye cysts     Past Surgical History:   Procedure Laterality Date    BREAST CYST ASPIRATION      CYSTOSCOPY W/ URETERAL STENT PLACEMENT Left 10/25/2021    Procedure: CYSTOSCOPY, WITH URETERAL STENT INSERTION;  Surgeon: Yonatan Max MD;  Location: [facility] OR;  Service: Urology;  Laterality: Left;    CYSTOURETEROSCOPY WITH RETROGRADE PYELOGRAPHY AND INSERTION OF STENT INTO URETER Left 10/17/2021    Procedure: CYSTOURETEROSCOPY, WITH RETROGRADE PYELOGRAM AND URETERAL STENT INSERTION;  Surgeon: Yonatan Max MD;  Location: [facility] OR;  Service: Urology;  Laterality: Left;    FOOT SURGERY Left 2015    bunion     laryngocele  06/2009    TONSILLECTOMY, ADENOIDECTOMY      URETEROSCOPY Left 10/25/2021     Procedure: URETEROSCOPY;  Surgeon: Yonatan Max MD;  Location: [facility];  Service: Urology;  Laterality: Left;     Family History   Problem Relation Age of Onset    Heart disease Father     Diabetes Father     COPD Father     Heart disease Mother     Diabetes Mother     COPD Mother     Kidney disease Mother     Arthritis Mother     Breast cancer Maternal Aunt     Heart disease Brother     Heart disease Brother     Drug abuse Brother     Ovarian cancer Neg Hx        Marital Status:   Alcohol History:  reports current alcohol use.  Tobacco History:  reports that she has never smoked. She has never used smokeless tobacco.  Drug History:  reports no history of drug use.    Review of patient's allergies indicates:   Allergen Reactions    Codeine Tinitus    Morphine Other (See Comments)     Severe migrain       Current Outpatient Medications:     ascorbic acid, vitamin C, (VITAMIN C) 500 MG tablet, Take 500 mg by mouth once daily., Disp: , Rfl:     CALCIUM-MAGNESIUM-ZINC ORAL, Take 1 tablet by mouth once daily., Disp: , Rfl:     coenzyme Q10 100 mg capsule, Take 100 mg by mouth once daily., Disp: , Rfl:     eletriptan (RELPAX) 40 MG tablet, Take 40 mg by mouth once as needed (migraine. may repeat dose in 2 hours if no relief.). , Disp: , Rfl:     ezetimibe (ZETIA) 10 mg tablet, Take 1 tablet (10 mg total) by mouth once daily., Disp: 90 tablet, Rfl: 3    glipiZIDE (GLUCOTROL) 10 MG tablet, Take 1 tablet (10 mg total) by mouth 2 (two) times daily before meals., Disp: 90 tablet, Rfl: 1    metFORMIN (GLUCOPHAGE-XR) 500 MG ER 24hr tablet, Take 2 tablets (1,000 mg total) by mouth 2 (two) times daily with meals., Disp: 360 tablet, Rfl: 1    multivitamin (THERAGRAN) per tablet, Take 1 tablet by mouth once daily., Disp: , Rfl:     turmeric/turmeric ext/pepr ext (TURMERIC-TURMERIC EXT-PEPPER) 500-3 mg Cap, Take 1 capsule by mouth once daily., Disp: , Rfl:     vit A/vit C/vit E/zinc/copper  "(ICAPS AREDS ORAL), Take by mouth once daily., Disp: , Rfl:   No current facility-administered medications for this visit.    Facility-Administered Medications Ordered in Other Visits:     HYDROmorphone injection 0.5 mg, 0.5 mg, Intravenous, Q5 Min PRN, Jacqueline Volpi-Abadie, MD    lactated ringers infusion, , Intravenous, Continuous, Crystal Mayes MD, Stopped at 10/25/21 1658    LIDOcaine (PF) 10 mg/ml (1%) injection 10 mg, 1 mL, Intradermal, Once, Crystal Mayes MD    lorazepam injection 0.25 mg, 0.25 mg, Intravenous, Q15 Min PRN, Jacqueline Volpi-Abadie, MD    ondansetron injection 4 mg, 4 mg, Intravenous, Daily PRN, Jacqueline Volpi-Abadie, MD    oxyCODONE-acetaminophen 5-325 mg per tablet 1 tablet, 1 tablet, Oral, Q3H PRN, Jacqueline Volpi-Abadie, MD    sodium chloride 0.9% flush 3 mL, 3 mL, Intravenous, PRN, Jacqueline Volpi-Abadie, MD    Review of Systems   Constitutional: Negative for appetite change, chills, fatigue, fever and unexpected weight change.   HENT: Negative for congestion.    Respiratory: Negative for cough, chest tightness and shortness of breath.    Cardiovascular: Negative for chest pain and palpitations.   Gastrointestinal: Negative for abdominal distention and abdominal pain.   Endocrine: Negative for cold intolerance and heat intolerance.   Genitourinary: Negative for difficulty urinating and dysuria.   Musculoskeletal: Negative for arthralgias and back pain.   Neurological: Negative for dizziness, weakness and headaches.          Objective:      Vitals:    04/25/22 0855   BP: 122/68   Pulse: 82   SpO2: 97%   Weight: 94.6 kg (208 lb 9.6 oz)   Height: 5' 7" (1.702 m)     Physical Exam  Constitutional:       General: She is not in acute distress.     Appearance: She is well-developed.   HENT:      Head: Normocephalic and atraumatic.   Eyes:      Conjunctiva/sclera: Conjunctivae normal.      Pupils: Pupils are equal, round, and reactive to light.   Neck:      Thyroid: No " thyromegaly.   Cardiovascular:      Rate and Rhythm: Normal rate and regular rhythm.      Heart sounds: Normal heart sounds.   Pulmonary:      Effort: Pulmonary effort is normal.      Breath sounds: Normal breath sounds.   Abdominal:      General: Bowel sounds are normal. There is no distension.      Palpations: Abdomen is soft.      Tenderness: There is no abdominal tenderness.   Musculoskeletal:         General: Normal range of motion.      Cervical back: Normal range of motion and neck supple.   Skin:     General: Skin is warm and dry.      Findings: No erythema.   Neurological:      Mental Status: She is alert and oriented to person, place, and time.      Cranial Nerves: No cranial nerve deficit.           Assessment:       1. Type 2 diabetes mellitus without complication, without long-term current use of insulin    2. Cervical cancer screening    3. Colon cancer screening    4. Essential hypertension    5. Migraine without status migrainosus, not intractable, unspecified migraine type         Plan:       Type 2 diabetes mellitus without complication, without long-term current use of insulin  Comments:  We will await a1c result. May still try to get her on metformin ER BID based upon readings. Planning to get more active.  Orders:  -     metFORMIN (GLUCOPHAGE-XR) 500 MG ER 24hr tablet; Take 2 tablets (1,000 mg total) by mouth 2 (two) times daily with meals.  Dispense: 360 tablet; Refill: 1    Cervical cancer screening  -     Ambulatory referral/consult to Obstetrics / Gynecology; Future; Expected date: 04/25/2022    Colon cancer screening  -     Cologuard Screening (Multitarget Stool DNA); Future; Expected date: 04/25/2022    Essential hypertension  Comments:  pressure is well controlled, continue as is.    Migraine without status migrainosus, not intractable, unspecified migraine type  Comments:  Has relpax but notices that her migraines have been better managed.      Follow up in about 3 months (around  7/25/2022) for Diabetic Check-Up.        4/25/2022 Robert Suarez PA-C

## 2022-04-26 ENCOUNTER — TELEPHONE (OUTPATIENT)
Dept: FAMILY MEDICINE | Facility: CLINIC | Age: 62
End: 2022-04-26

## 2022-04-26 LAB
ALBUMIN SERPL-MCNC: 4 G/DL (ref 3.6–5.1)
ALBUMIN/CREAT UR: 3 MCG/MG CREAT
ALBUMIN/GLOB SERPL: 1.5 (CALC) (ref 1–2.5)
ALP SERPL-CCNC: 55 U/L (ref 37–153)
ALT SERPL-CCNC: 21 U/L (ref 6–29)
APPEARANCE UR: CLEAR
AST SERPL-CCNC: 15 U/L (ref 10–35)
BACTERIA #/AREA URNS HPF: ABNORMAL /HPF
BACTERIA UR CULT: ABNORMAL
BASOPHILS # BLD AUTO: 18 CELLS/UL (ref 0–200)
BASOPHILS NFR BLD AUTO: 0.4 %
BILIRUB SERPL-MCNC: 0.6 MG/DL (ref 0.2–1.2)
BILIRUB UR QL STRIP: NEGATIVE
BUN SERPL-MCNC: 10 MG/DL (ref 7–25)
BUN/CREAT SERPL: ABNORMAL (CALC) (ref 6–22)
CALCIUM SERPL-MCNC: 9.4 MG/DL (ref 8.6–10.4)
CHLORIDE SERPL-SCNC: 107 MMOL/L (ref 98–110)
CHOLEST SERPL-MCNC: 204 MG/DL
CHOLEST/HDLC SERPL: 3.6 (CALC)
CO2 SERPL-SCNC: 28 MMOL/L (ref 20–32)
COLOR UR: YELLOW
CREAT SERPL-MCNC: 0.94 MG/DL (ref 0.5–0.99)
CREAT UR-MCNC: 146 MG/DL (ref 20–275)
EOSINOPHIL # BLD AUTO: 171 CELLS/UL (ref 15–500)
EOSINOPHIL NFR BLD AUTO: 3.8 %
ERYTHROCYTE [DISTWIDTH] IN BLOOD BY AUTOMATED COUNT: 13.6 % (ref 11–15)
GLOBULIN SER CALC-MCNC: 2.7 G/DL (CALC) (ref 1.9–3.7)
GLUCOSE SERPL-MCNC: 230 MG/DL (ref 65–99)
GLUCOSE UR QL STRIP: ABNORMAL
HBA1C MFR BLD: 8 % OF TOTAL HGB
HCT VFR BLD AUTO: 43.1 % (ref 35–45)
HDLC SERPL-MCNC: 57 MG/DL
HGB BLD-MCNC: 14.1 G/DL (ref 11.7–15.5)
HGB UR QL STRIP: NEGATIVE
HYALINE CASTS #/AREA URNS LPF: ABNORMAL /LPF
KETONES UR QL STRIP: NEGATIVE
LDLC SERPL CALC-MCNC: 118 MG/DL (CALC)
LEFT EYE DM RETINOPATHY: NORMAL
LEUKOCYTE ESTERASE UR QL STRIP: NEGATIVE
LYMPHOCYTES # BLD AUTO: 2061 CELLS/UL (ref 850–3900)
LYMPHOCYTES NFR BLD AUTO: 45.8 %
MCH RBC QN AUTO: 29.7 PG (ref 27–33)
MCHC RBC AUTO-ENTMCNC: 32.7 G/DL (ref 32–36)
MCV RBC AUTO: 90.9 FL (ref 80–100)
MICROALBUMIN UR-MCNC: 0.5 MG/DL
MONOCYTES # BLD AUTO: 423 CELLS/UL (ref 200–950)
MONOCYTES NFR BLD AUTO: 9.4 %
NEUTROPHILS # BLD AUTO: 1827 CELLS/UL (ref 1500–7800)
NEUTROPHILS NFR BLD AUTO: 40.6 %
NITRITE UR QL STRIP: NEGATIVE
NONHDLC SERPL-MCNC: 147 MG/DL (CALC)
PH UR STRIP: 5.5 [PH] (ref 5–8)
PLATELET # BLD AUTO: 234 THOUSAND/UL (ref 140–400)
PMV BLD REES-ECKER: 10.1 FL (ref 7.5–12.5)
POTASSIUM SERPL-SCNC: 4.5 MMOL/L (ref 3.5–5.3)
PROT SERPL-MCNC: 6.7 G/DL (ref 6.1–8.1)
PROT UR QL STRIP: NEGATIVE
RBC # BLD AUTO: 4.74 MILLION/UL (ref 3.8–5.1)
RBC #/AREA URNS HPF: ABNORMAL /HPF
RIGHT EYE DM RETINOPATHY: NORMAL
SODIUM SERPL-SCNC: 142 MMOL/L (ref 135–146)
SP GR UR STRIP: 1.02 (ref 1–1.03)
SQUAMOUS #/AREA URNS HPF: ABNORMAL /HPF
TRIGL SERPL-MCNC: 171 MG/DL
TSH SERPL-ACNC: 2.68 MIU/L (ref 0.4–4.5)
WBC # BLD AUTO: 4.5 THOUSAND/UL (ref 3.8–10.8)
WBC #/AREA URNS HPF: ABNORMAL /HPF

## 2022-04-26 NOTE — TELEPHONE ENCOUNTER
Call patient and let her know that a1c is indeed up as suspected. 8% now. I would like her to try to get the new metformin ER filled and taking that. It should be more affordable and hopefully much better on her stomach.

## 2022-04-26 NOTE — TELEPHONE ENCOUNTER
----- Message from Rosangela Pham LPN sent at 4/25/2022  4:13 PM CDT -----    ----- Message -----  From: Alicia Metzger  Sent: 4/25/2022   2:27 PM CDT  To: Robert Suarez Staff    OPHTHALMOLOGY

## 2022-07-21 ENCOUNTER — TELEPHONE (OUTPATIENT)
Dept: FAMILY MEDICINE | Facility: CLINIC | Age: 62
End: 2022-07-21

## 2022-07-21 ENCOUNTER — PATIENT MESSAGE (OUTPATIENT)
Dept: FAMILY MEDICINE | Facility: CLINIC | Age: 62
End: 2022-07-21

## 2022-07-21 DIAGNOSIS — Z79.899 ENCOUNTER FOR LONG-TERM (CURRENT) USE OF OTHER MEDICATIONS: Primary | ICD-10-CM

## 2022-07-21 NOTE — TELEPHONE ENCOUNTER
----- Message from Sindhu Kiser sent at 7/21/2022 12:52 PM CDT -----  Pt sent a message through [employer] and wanted to know if she need labs before her appt Monday.  [phone]

## 2022-07-25 ENCOUNTER — OFFICE VISIT (OUTPATIENT)
Dept: FAMILY MEDICINE | Facility: CLINIC | Age: 62
End: 2022-07-25
Payer: COMMERCIAL

## 2022-07-25 VITALS
HEART RATE: 80 BPM | WEIGHT: 203 LBS | OXYGEN SATURATION: 98 % | SYSTOLIC BLOOD PRESSURE: 100 MMHG | DIASTOLIC BLOOD PRESSURE: 64 MMHG | BODY MASS INDEX: 31.86 KG/M2 | HEIGHT: 67 IN

## 2022-07-25 DIAGNOSIS — E11.9 TYPE 2 DIABETES MELLITUS WITHOUT COMPLICATION, WITHOUT LONG-TERM CURRENT USE OF INSULIN: Primary | ICD-10-CM

## 2022-07-25 DIAGNOSIS — Z12.11 COLON CANCER SCREENING: ICD-10-CM

## 2022-07-25 DIAGNOSIS — H81.10 BENIGN PAROXYSMAL POSITIONAL VERTIGO, UNSPECIFIED LATERALITY: ICD-10-CM

## 2022-07-25 DIAGNOSIS — E66.9 OBESITY (BMI 30.0-34.9): ICD-10-CM

## 2022-07-25 LAB
ALBUMIN SERPL-MCNC: 4.1 G/DL (ref 3.6–5.1)
ALBUMIN/GLOB SERPL: 1.6 (CALC) (ref 1–2.5)
ALP SERPL-CCNC: 54 U/L (ref 37–153)
ALT SERPL-CCNC: 21 U/L (ref 6–29)
AST SERPL-CCNC: 14 U/L (ref 10–35)
BILIRUB SERPL-MCNC: 0.7 MG/DL (ref 0.2–1.2)
BUN SERPL-MCNC: 16 MG/DL (ref 7–25)
BUN/CREAT SERPL: ABNORMAL (CALC) (ref 6–22)
CALCIUM SERPL-MCNC: 9.9 MG/DL (ref 8.6–10.4)
CHLORIDE SERPL-SCNC: 105 MMOL/L (ref 98–110)
CHOLEST SERPL-MCNC: 211 MG/DL
CHOLEST/HDLC SERPL: 3.8 (CALC)
CO2 SERPL-SCNC: 26 MMOL/L (ref 20–32)
CREAT SERPL-MCNC: 0.99 MG/DL (ref 0.5–1.05)
EGFR: 64 ML/MIN/1.73M2
GLOBULIN SER CALC-MCNC: 2.6 G/DL (CALC) (ref 1.9–3.7)
GLUCOSE SERPL-MCNC: 188 MG/DL (ref 65–99)
HBA1C MFR BLD: 7.7 %
HBA1C MFR BLD: 7.9 % OF TOTAL HGB
HDLC SERPL-MCNC: 55 MG/DL
LDLC SERPL CALC-MCNC: 124 MG/DL (CALC)
NONHDLC SERPL-MCNC: 156 MG/DL (CALC)
POTASSIUM SERPL-SCNC: 4.4 MMOL/L (ref 3.5–5.3)
PROT SERPL-MCNC: 6.7 G/DL (ref 6.1–8.1)
SODIUM SERPL-SCNC: 140 MMOL/L (ref 135–146)
TRIGL SERPL-MCNC: 200 MG/DL

## 2022-07-25 PROCEDURE — 3008F BODY MASS INDEX DOCD: CPT | Mod: CPTII,S$GLB,, | Performed by: PHYSICIAN ASSISTANT

## 2022-07-25 PROCEDURE — 3061F NEG MICROALBUMINURIA REV: CPT | Mod: CPTII,S$GLB,, | Performed by: PHYSICIAN ASSISTANT

## 2022-07-25 PROCEDURE — 83036 HEMOGLOBIN GLYCOSYLATED A1C: CPT | Mod: QW,,, | Performed by: PHYSICIAN ASSISTANT

## 2022-07-25 PROCEDURE — 3051F HG A1C>EQUAL 7.0%<8.0%: CPT | Mod: CPTII,S$GLB,, | Performed by: PHYSICIAN ASSISTANT

## 2022-07-25 PROCEDURE — 3051F PR MOST RECENT HEMOGLOBIN A1C LEVEL 7.0 - < 8.0%: ICD-10-PCS | Mod: CPTII,S$GLB,, | Performed by: PHYSICIAN ASSISTANT

## 2022-07-25 PROCEDURE — 83036 POCT HEMOGLOBIN A1C: ICD-10-PCS | Mod: QW,,, | Performed by: PHYSICIAN ASSISTANT

## 2022-07-25 PROCEDURE — 99214 OFFICE O/P EST MOD 30 MIN: CPT | Mod: S$GLB,,, | Performed by: PHYSICIAN ASSISTANT

## 2022-07-25 PROCEDURE — 3008F PR BODY MASS INDEX (BMI) DOCUMENTED: ICD-10-PCS | Mod: CPTII,S$GLB,, | Performed by: PHYSICIAN ASSISTANT

## 2022-07-25 PROCEDURE — 99214 PR OFFICE/OUTPT VISIT, EST, LEVL IV, 30-39 MIN: ICD-10-PCS | Mod: S$GLB,,, | Performed by: PHYSICIAN ASSISTANT

## 2022-07-25 PROCEDURE — 1159F MED LIST DOCD IN RCRD: CPT | Mod: CPTII,S$GLB,, | Performed by: PHYSICIAN ASSISTANT

## 2022-07-25 PROCEDURE — 1159F PR MEDICATION LIST DOCUMENTED IN MEDICAL RECORD: ICD-10-PCS | Mod: CPTII,S$GLB,, | Performed by: PHYSICIAN ASSISTANT

## 2022-07-25 PROCEDURE — 3061F PR NEG MICROALBUMINURIA RESULT DOCUMENTED/REVIEW: ICD-10-PCS | Mod: CPTII,S$GLB,, | Performed by: PHYSICIAN ASSISTANT

## 2022-07-25 PROCEDURE — 3066F PR DOCUMENTATION OF TREATMENT FOR NEPHROPATHY: ICD-10-PCS | Mod: CPTII,S$GLB,, | Performed by: PHYSICIAN ASSISTANT

## 2022-07-25 PROCEDURE — 3066F NEPHROPATHY DOC TX: CPT | Mod: CPTII,S$GLB,, | Performed by: PHYSICIAN ASSISTANT

## 2022-07-25 RX ORDER — PHENTERMINE HYDROCHLORIDE 37.5 MG/1
37.5 TABLET ORAL
Qty: 30 TABLET | Refills: 1 | Status: SHIPPED | OUTPATIENT
Start: 2022-07-25 | End: 2022-09-08 | Stop reason: SDUPTHER

## 2022-07-25 NOTE — PROGRESS NOTES
SUBJECTIVE:    Patient ID: Nubia lFaherty is a 62 y.o. female.    Chief Complaint: Follow-up (3 mo f/u for diabetes//no med bottles//A1C ordered//cologuard ordered//tc)    This is a 62-year-old female who presents today for 3 month diabetic checkup.  A1c today 7.7% and improved slightly from 8% at the last visit.  She was placed on metformin ER b.i.d..  Reports no recent lows. Gotten a great deal better over the past few months. She is working a bit more now. Back and forth between [employer]. Getting her parents estate in order. She is trying to lose weight. Down about 5 lbs since the last visit. Hoping to continue with weight loss. [employer]  at home. Light weights. Has been successful with phentermine trial in the past.      Office Visit on 07/25/2022   Component Date Value Ref Range Status    Hemoglobin A1C 07/25/2022 7.7  % Final   Patient Message on 07/21/2022   Component Date Value Ref Range Status    Glucose 07/22/2022 188 (A) 65 - 99 mg/dL Final    BUN 07/22/2022 16  7 - 25 mg/dL Final    Creatinine 07/22/2022 0.99  0.50 - 1.05 mg/dL Final    EGFR 07/22/2022 64  > OR = 60 mL/min/1.73m2 Final    BUN/Creatinine Ratio 07/22/2022 NOT APPLICABLE  6 - 22 (calc) Final    Sodium 07/22/2022 140  135 - 146 mmol/L Final    Potassium 07/22/2022 4.4  3.5 - 5.3 mmol/L Final    Chloride 07/22/2022 105  98 - 110 mmol/L Final    CO2 07/22/2022 26  20 - 32 mmol/L Final    Calcium 07/22/2022 9.9  8.6 - 10.4 mg/dL Final    Total Protein 07/22/2022 6.7  6.1 - 8.1 g/dL Final    Albumin 07/22/2022 4.1  3.6 - 5.1 g/dL Final    Globulin, Total 07/22/2022 2.6  1.9 - 3.7 g/dL (calc) Final    Albumin/Globulin Ratio 07/22/2022 1.6  1.0 - 2.5 (calc) Final    Total Bilirubin 07/22/2022 0.7  0.2 - 1.2 mg/dL Final    Alkaline Phosphatase 07/22/2022 54  37 - 153 U/L Final    AST 07/22/2022 14  10 - 35 U/L Final    ALT 07/22/2022 21  6 - 29 U/L Final    Cholesterol 07/22/2022 211 (A) <200 mg/dL Final    HDL 07/22/2022  55  > OR = 50 mg/dL Final    Triglycerides 07/22/2022 200 (A) <150 mg/dL Final    LDL Cholesterol 07/22/2022 124 (A) mg/dL (calc) Final    HDL/Cholesterol Ratio 07/22/2022 3.8  <5.0 (calc) Final    Non HDL Chol. (LDL+VLDL) 07/22/2022 156 (A) <130 mg/dL (calc) Final   Telephone on 04/12/2022   Component Date Value Ref Range Status    WBC 04/25/2022 4.5  3.8 - 10.8 Thousand/uL Final    RBC 04/25/2022 4.74  3.80 - 5.10 Million/uL Final    Hemoglobin 04/25/2022 14.1  11.7 - 15.5 g/dL Final    Hematocrit 04/25/2022 43.1  35.0 - 45.0 % Final    MCV 04/25/2022 90.9  80.0 - 100.0 fL Final    MCH 04/25/2022 29.7  27.0 - 33.0 pg Final    MCHC 04/25/2022 32.7  32.0 - 36.0 g/dL Final    RDW 04/25/2022 13.6  11.0 - 15.0 % Final    Platelets 04/25/2022 234  140 - 400 Thousand/uL Final    MPV 04/25/2022 10.1  7.5 - 12.5 fL Final    Neutrophils, Abs 04/25/2022 1,827  1,500 - 7,800 cells/uL Final    Lymph # 04/25/2022 2,061  850 - 3,900 cells/uL Final    Mono # 04/25/2022 423  200 - 950 cells/uL Final    Eos # 04/25/2022 171  15 - 500 cells/uL Final    Baso # 04/25/2022 18  0 - 200 cells/uL Final    Neutrophils Relative 04/25/2022 40.6  % Final    Lymph % 04/25/2022 45.8  % Final    Mono % 04/25/2022 9.4  % Final    Eosinophil % 04/25/2022 3.8  % Final    Basophil % 04/25/2022 0.4  % Final    Glucose 04/25/2022 230 (A) 65 - 99 mg/dL Final    BUN 04/25/2022 10  7 - 25 mg/dL Final    Creatinine 04/25/2022 0.94  0.50 - 0.99 mg/dL Final    eGFR if non  04/25/2022 64  > OR = 60 mL/min/1.73m2 Final    eGFR if  04/25/2022 74  > OR = 60 mL/min/1.73m2 Final    BUN/Creatinine Ratio 04/25/2022 NOT APPLICABLE  6 - 22 (calc) Final    Sodium 04/25/2022 142  135 - 146 mmol/L Final    Potassium 04/25/2022 4.5  3.5 - 5.3 mmol/L Final    Chloride 04/25/2022 107  98 - 110 mmol/L Final    CO2 04/25/2022 28  20 - 32 mmol/L Final    Calcium 04/25/2022 9.4  8.6 - 10.4 mg/dL Final    Total  Protein 04/25/2022 6.7  6.1 - 8.1 g/dL Final    Albumin 04/25/2022 4.0  3.6 - 5.1 g/dL Final    Globulin, Total 04/25/2022 2.7  1.9 - 3.7 g/dL (calc) Final    Albumin/Globulin Ratio 04/25/2022 1.5  1.0 - 2.5 (calc) Final    Total Bilirubin 04/25/2022 0.6  0.2 - 1.2 mg/dL Final    Alkaline Phosphatase 04/25/2022 55  37 - 153 U/L Final    AST 04/25/2022 15  10 - 35 U/L Final    ALT 04/25/2022 21  6 - 29 U/L Final    Hemoglobin A1C 04/25/2022 8.0 (A) <5.7 % of total Hgb Final    Creatinine, Urine 04/25/2022 146  20 - 275 mg/dL Final    Microalb, Ur 04/25/2022 0.5  See Note: mg/dL Final    Microalb/Creat Ratio 04/25/2022 3  <30 mcg/mg creat Final    TSH w/reflex to FT4 04/25/2022 2.68  0.40 - 4.50 mIU/L Final    Color, UA 04/25/2022 YELLOW  YELLOW Final    Appearance, UA 04/25/2022 CLEAR  CLEAR Final    Specific [place], UA 04/25/2022 1.019  1.001 - 1.035 Final    pH, UA 04/25/2022 5.5  5.0 - 8.0 Final    Glucose, UA 04/25/2022 2+ (A) NEGATIVE Final    Bilirubin, UA 04/25/2022 NEGATIVE  NEGATIVE Final    Ketones, UA 04/25/2022 NEGATIVE  NEGATIVE Final    Occult Blood UA 04/25/2022 NEGATIVE  NEGATIVE Final    Protein, UA 04/25/2022 NEGATIVE  NEGATIVE Final    Nitrite, UA 04/25/2022 NEGATIVE  NEGATIVE Final    Leukocytes, UA 04/25/2022 NEGATIVE  NEGATIVE Final    WBC Casts, UA 04/25/2022 NONE SEEN  < OR = 5 /HPF Final    RBC Casts, UA 04/25/2022 NONE SEEN  < OR = 2 /HPF Final    Squam Epithel, UA 04/25/2022 0-5  < OR = 5 /HPF Final    Bacteria, UA 04/25/2022 NONE SEEN  NONE SEEN /HPF Final    Hyaline Casts, UA 04/25/2022 NONE SEEN  NONE SEEN /LPF Final    Reflexive Urine Culture 04/25/2022    Final    Cholesterol 04/25/2022 204 (A) <200 mg/dL Final    HDL 04/25/2022 57  > OR = 50 mg/dL Final    Triglycerides 04/25/2022 171 (A) <150 mg/dL Final    LDL Cholesterol 04/25/2022 118 (A) mg/dL (calc) Final    HDL/Cholesterol Ratio 04/25/2022 3.6  <5.0 (calc) Final    Non HDL Chol. (LDL+VLDL)  04/25/2022 147 (A) <130 mg/dL (calc) Final       Past Medical History:   Diagnosis Date    Diabetes mellitus     Hiatal hernia     Hypercholesteremia     Kidney stones     Kidney stones 11/01/2021    removed kidney stone left side    Migraine     Retina disorder     left eye cysts     Past Surgical History:   Procedure Laterality Date    BREAST CYST ASPIRATION      CYSTOSCOPY W/ URETERAL STENT PLACEMENT Left 10/25/2021    Procedure: CYSTOSCOPY, WITH URETERAL STENT INSERTION;  Surgeon: Yonatan Max MD;  Location: [facility];  Service: Urology;  Laterality: Left;    CYSTOURETEROSCOPY WITH RETROGRADE PYELOGRAPHY AND INSERTION OF STENT INTO URETER Left 10/17/2021    Procedure: CYSTOURETEROSCOPY, WITH RETROGRADE PYELOGRAM AND URETERAL STENT INSERTION;  Surgeon: Yonatan Max MD;  Location: [facility] OR;  Service: Urology;  Laterality: Left;    FOOT SURGERY Left 2015    bunion     laryngocele  06/2009    TONSILLECTOMY, ADENOIDECTOMY      URETEROSCOPY Left 10/25/2021    Procedure: URETEROSCOPY;  Surgeon: Yonatan Max MD;  Location: [facility] OR;  Service: Urology;  Laterality: Left;     Family History   Problem Relation Age of Onset    Heart disease Father     Diabetes Father     COPD Father     Heart disease Mother     Diabetes Mother     COPD Mother     Kidney disease Mother     Arthritis Mother     Breast cancer Maternal Aunt     Heart disease Brother     Heart disease Brother     Drug abuse Brother     Ovarian cancer Neg Hx        Marital Status:   Alcohol History:  reports current alcohol use.  Tobacco History:  reports that she has never smoked. She has never used smokeless tobacco.  Drug History:  reports no history of drug use.    Review of patient's allergies indicates:   Allergen Reactions    Codeine Tinitus    Morphine Other (See Comments)     Severe migrain       Current Outpatient Medications:     ascorbic acid, vitamin C, (VITAMIN C) 500 MG tablet, Take 500 mg by  mouth once daily., Disp: , Rfl:     CALCIUM-MAGNESIUM-ZINC ORAL, Take 1 tablet by mouth once daily., Disp: , Rfl:     coenzyme Q10 100 mg capsule, Take 100 mg by mouth once daily., Disp: , Rfl:     eletriptan (RELPAX) 40 MG tablet, Take 40 mg by mouth once as needed (migraine. may repeat dose in 2 hours if no relief.). , Disp: , Rfl:     ezetimibe (ZETIA) 10 mg tablet, Take 1 tablet (10 mg total) by mouth once daily., Disp: 90 tablet, Rfl: 3    glipiZIDE (GLUCOTROL) 10 MG tablet, Take 1 tablet (10 mg total) by mouth 2 (two) times daily before meals., Disp: 90 tablet, Rfl: 1    metFORMIN (GLUCOPHAGE-XR) 500 MG ER 24hr tablet, Take 2 tablets (1,000 mg total) by mouth 2 (two) times daily with meals., Disp: 360 tablet, Rfl: 1    multivitamin (THERAGRAN) per tablet, Take 1 tablet by mouth once daily., Disp: , Rfl:     phentermine (ADIPEX-P) 37.5 mg tablet, Take 1 tablet (37.5 mg total) by mouth before breakfast., Disp: 30 tablet, Rfl: 1    turmeric/turmeric ext/pepr ext (TURMERIC-TURMERIC EXT-PEPPER) 500-3 mg Cap, Take 1 capsule by mouth once daily., Disp: , Rfl:     vit A/vit C/vit E/zinc/copper (ICAPS AREDS ORAL), Take by mouth once daily., Disp: , Rfl:   No current facility-administered medications for this visit.    Facility-Administered Medications Ordered in Other Visits:     HYDROmorphone injection 0.5 mg, 0.5 mg, Intravenous, Q5 Min PRN, Jacqueline Volpi-Abadie, MD    lactated ringers infusion, , Intravenous, Continuous, Crystal Mayes MD, Stopped at 10/25/21 1658    LIDOcaine (PF) 10 mg/ml (1%) injection 10 mg, 1 mL, Intradermal, Once, Crystal Mayes MD    lorazepam injection 0.25 mg, 0.25 mg, Intravenous, Q15 Min PRN, Jacqueline Volpi-Abadie, MD    ondansetron injection 4 mg, 4 mg, Intravenous, Daily PRN, Jacqueline Volpi-Abadie, MD    oxyCODONE-acetaminophen 5-325 mg per tablet 1 tablet, 1 tablet, Oral, Q3H PRN, Jacqueline Volpi-Abadie, MD    sodium chloride 0.9% flush 3 mL, 3 mL,  "Intravenous, PRN, Jacqueline Volpi-Abadie, MD    Review of Systems   Constitutional: Negative for appetite change, chills, fatigue, fever and unexpected weight change.   HENT: Negative for congestion.    Respiratory: Negative for cough, chest tightness and shortness of breath.    Cardiovascular: Negative for chest pain and palpitations.   Gastrointestinal: Negative for abdominal distention and abdominal pain.   Endocrine: Negative for cold intolerance and heat intolerance.   Genitourinary: Negative for difficulty urinating and dysuria.   Musculoskeletal: Negative for arthralgias and back pain.   Neurological: Negative for dizziness, weakness and headaches.          Objective:      Vitals:    07/25/22 0920   BP: 100/64   Pulse: 80   SpO2: 98%   Weight: 92.1 kg (203 lb)   Height: 5' 7" (1.702 m)     Physical Exam  Constitutional:       General: She is not in acute distress.     Appearance: She is well-developed. She is obese.   HENT:      Head: Normocephalic and atraumatic.   Eyes:      Conjunctiva/sclera: Conjunctivae normal.      Pupils: Pupils are equal, round, and reactive to light.   Neck:      Thyroid: No thyromegaly.   Cardiovascular:      Rate and Rhythm: Normal rate and regular rhythm.      Heart sounds: Normal heart sounds.   Pulmonary:      Effort: Pulmonary effort is normal.      Breath sounds: Normal breath sounds.   Abdominal:      General: Bowel sounds are normal. There is no distension.      Palpations: Abdomen is soft.      Tenderness: There is no abdominal tenderness.   Musculoskeletal:         General: Normal range of motion.      Cervical back: Normal range of motion and neck supple.   Skin:     General: Skin is warm and dry.      Findings: No erythema.   Neurological:      Mental Status: She is alert and oriented to person, place, and time.      Cranial Nerves: No cranial nerve deficit.           Assessment:       1. Type 2 diabetes mellitus without complication, without long-term current use of " insulin    2. Colon cancer screening    3. Obesity (BMI 30.0-34.9)    4. Benign paroxysmal positional vertigo, unspecified laterality         Plan:       Type 2 diabetes mellitus without complication, without long-term current use of insulin  Comments:  A1c 7.7% and slightly improved. will have her increase to 1000mg BID met and keep glipizide in place.  Orders:  -     Hemoglobin A1C, POCT    Colon cancer screening  Comments:  agrees to cologuard.  Orders:  -     Cologuard Screening (Multitarget Stool DNA); Future; Expected date: 07/25/2022    Obesity (BMI 30.0-34.9)  Comments:  open to trial with phentermine. discussed side effects and HTN. will plan to meet back up in 8 wks.  Orders:  -     phentermine (ADIPEX-P) 37.5 mg tablet; Take 1 tablet (37.5 mg total) by mouth before breakfast.  Dispense: 30 tablet; Refill: 1    Benign paroxysmal positional vertigo, unspecified laterality  Comments:  will plan to see ENT for further maneuvering and treatment.      Follow up in about 2 months (around 9/25/2022) for weight loss..        7/25/2022 Robert Suarez PA-C

## 2022-09-08 ENCOUNTER — TELEPHONE (OUTPATIENT)
Dept: FAMILY MEDICINE | Facility: CLINIC | Age: 62
End: 2022-09-08

## 2022-09-08 DIAGNOSIS — E66.9 OBESITY (BMI 30.0-34.9): ICD-10-CM

## 2022-09-08 RX ORDER — PHENTERMINE HYDROCHLORIDE 37.5 MG/1
37.5 TABLET ORAL
Qty: 30 TABLET | Refills: 1 | Status: SHIPPED | OUTPATIENT
Start: 2022-09-08 | End: 2022-11-07

## 2022-09-09 LAB — NONINV COLON CA DNA+OCC BLD SCRN STL QL: NEGATIVE

## 2022-12-12 ENCOUNTER — PATIENT MESSAGE (OUTPATIENT)
Dept: FAMILY MEDICINE | Facility: CLINIC | Age: 62
End: 2022-12-12

## 2022-12-12 DIAGNOSIS — Z79.899 ENCOUNTER FOR LONG-TERM (CURRENT) USE OF OTHER MEDICATIONS: Primary | ICD-10-CM

## 2022-12-12 DIAGNOSIS — E78.5 HYPERLIPIDEMIA, UNSPECIFIED HYPERLIPIDEMIA TYPE: ICD-10-CM

## 2022-12-12 DIAGNOSIS — I10 ESSENTIAL HYPERTENSION: ICD-10-CM

## 2022-12-12 DIAGNOSIS — E11.9 TYPE 2 DIABETES MELLITUS WITHOUT COMPLICATION, WITHOUT LONG-TERM CURRENT USE OF INSULIN: ICD-10-CM

## 2022-12-14 LAB
ALBUMIN SERPL-MCNC: 4 G/DL (ref 3.6–5.1)
ALBUMIN/GLOB SERPL: 1.7 (CALC) (ref 1–2.5)
ALP SERPL-CCNC: 48 U/L (ref 37–153)
ALT SERPL-CCNC: 18 U/L (ref 6–29)
AST SERPL-CCNC: 15 U/L (ref 10–35)
BILIRUB SERPL-MCNC: 0.5 MG/DL (ref 0.2–1.2)
BUN SERPL-MCNC: 14 MG/DL (ref 7–25)
BUN/CREAT SERPL: ABNORMAL (CALC) (ref 6–22)
CALCIUM SERPL-MCNC: 9.5 MG/DL (ref 8.6–10.4)
CHLORIDE SERPL-SCNC: 107 MMOL/L (ref 98–110)
CHOLEST SERPL-MCNC: 207 MG/DL
CHOLEST/HDLC SERPL: 3.4 (CALC)
CO2 SERPL-SCNC: 26 MMOL/L (ref 20–32)
CREAT SERPL-MCNC: 0.86 MG/DL (ref 0.5–1.05)
EGFR: 75 ML/MIN/1.73M2
GLOBULIN SER CALC-MCNC: 2.3 G/DL (CALC) (ref 1.9–3.7)
GLUCOSE SERPL-MCNC: 155 MG/DL (ref 65–99)
HDLC SERPL-MCNC: 61 MG/DL
LDLC SERPL CALC-MCNC: 121 MG/DL (CALC)
NONHDLC SERPL-MCNC: 146 MG/DL (CALC)
POTASSIUM SERPL-SCNC: 4.2 MMOL/L (ref 3.5–5.3)
PROT SERPL-MCNC: 6.3 G/DL (ref 6.1–8.1)
SODIUM SERPL-SCNC: 141 MMOL/L (ref 135–146)
TRIGL SERPL-MCNC: 141 MG/DL

## 2022-12-15 ENCOUNTER — TELEPHONE (OUTPATIENT)
Dept: FAMILY MEDICINE | Facility: CLINIC | Age: 62
End: 2022-12-15

## 2022-12-15 NOTE — TELEPHONE ENCOUNTER
----- Message from Jing Shea sent at 12/15/2022  1:18 PM CST -----  Regarding: labs  Pt received results of her labs.  Flavio ordered a A1C but it looks like it was not done.  Nubia would like to know if she needs it done tomorrow for her appointment Monday? Please call [phone].

## 2022-12-15 NOTE — TELEPHONE ENCOUNTER
A1c was ordered. Spoke pt amd she stated they did not do a1c when she had her labs done. Pt is going to come Friday morning and do labs

## 2022-12-16 ENCOUNTER — TELEPHONE (OUTPATIENT)
Dept: FAMILY MEDICINE | Facility: CLINIC | Age: 62
End: 2022-12-16

## 2022-12-16 DIAGNOSIS — E11.9 TYPE 2 DIABETES MELLITUS WITHOUT COMPLICATION, WITHOUT LONG-TERM CURRENT USE OF INSULIN: Primary | ICD-10-CM

## 2022-12-17 LAB — HBA1C MFR BLD: 7.3 % OF TOTAL HGB

## 2022-12-19 ENCOUNTER — OFFICE VISIT (OUTPATIENT)
Dept: FAMILY MEDICINE | Facility: CLINIC | Age: 62
End: 2022-12-19
Payer: COMMERCIAL

## 2022-12-19 VITALS
HEIGHT: 67 IN | BODY MASS INDEX: 30.29 KG/M2 | SYSTOLIC BLOOD PRESSURE: 116 MMHG | DIASTOLIC BLOOD PRESSURE: 73 MMHG | HEART RATE: 100 BPM | WEIGHT: 193 LBS | OXYGEN SATURATION: 98 %

## 2022-12-19 DIAGNOSIS — E66.9 OBESITY (BMI 30.0-34.9): ICD-10-CM

## 2022-12-19 DIAGNOSIS — J06.9 UPPER RESPIRATORY TRACT INFECTION, UNSPECIFIED TYPE: ICD-10-CM

## 2022-12-19 DIAGNOSIS — E11.9 TYPE 2 DIABETES MELLITUS WITHOUT COMPLICATION, WITHOUT LONG-TERM CURRENT USE OF INSULIN: Primary | ICD-10-CM

## 2022-12-19 DIAGNOSIS — I10 ESSENTIAL HYPERTENSION: ICD-10-CM

## 2022-12-19 DIAGNOSIS — E78.5 HYPERLIPIDEMIA, UNSPECIFIED HYPERLIPIDEMIA TYPE: ICD-10-CM

## 2022-12-19 PROCEDURE — 3078F DIAST BP <80 MM HG: CPT | Mod: CPTII,S$GLB,, | Performed by: PHYSICIAN ASSISTANT

## 2022-12-19 PROCEDURE — 3008F BODY MASS INDEX DOCD: CPT | Mod: CPTII,S$GLB,, | Performed by: PHYSICIAN ASSISTANT

## 2022-12-19 PROCEDURE — 3078F PR MOST RECENT DIASTOLIC BLOOD PRESSURE < 80 MM HG: ICD-10-PCS | Mod: CPTII,S$GLB,, | Performed by: PHYSICIAN ASSISTANT

## 2022-12-19 PROCEDURE — 3074F PR MOST RECENT SYSTOLIC BLOOD PRESSURE < 130 MM HG: ICD-10-PCS | Mod: CPTII,S$GLB,, | Performed by: PHYSICIAN ASSISTANT

## 2022-12-19 PROCEDURE — 3061F NEG MICROALBUMINURIA REV: CPT | Mod: CPTII,S$GLB,, | Performed by: PHYSICIAN ASSISTANT

## 2022-12-19 PROCEDURE — 3051F PR MOST RECENT HEMOGLOBIN A1C LEVEL 7.0 - < 8.0%: ICD-10-PCS | Mod: CPTII,S$GLB,, | Performed by: PHYSICIAN ASSISTANT

## 2022-12-19 PROCEDURE — 3066F NEPHROPATHY DOC TX: CPT | Mod: CPTII,S$GLB,, | Performed by: PHYSICIAN ASSISTANT

## 2022-12-19 PROCEDURE — 96372 PR INJECTION,THERAP/PROPH/DIAG2ST, IM OR SUBCUT: ICD-10-PCS | Mod: S$GLB,,, | Performed by: PHYSICIAN ASSISTANT

## 2022-12-19 PROCEDURE — 3061F PR NEG MICROALBUMINURIA RESULT DOCUMENTED/REVIEW: ICD-10-PCS | Mod: CPTII,S$GLB,, | Performed by: PHYSICIAN ASSISTANT

## 2022-12-19 PROCEDURE — 3074F SYST BP LT 130 MM HG: CPT | Mod: CPTII,S$GLB,, | Performed by: PHYSICIAN ASSISTANT

## 2022-12-19 PROCEDURE — 99214 OFFICE O/P EST MOD 30 MIN: CPT | Mod: 25,S$GLB,, | Performed by: PHYSICIAN ASSISTANT

## 2022-12-19 PROCEDURE — 3008F PR BODY MASS INDEX (BMI) DOCUMENTED: ICD-10-PCS | Mod: CPTII,S$GLB,, | Performed by: PHYSICIAN ASSISTANT

## 2022-12-19 PROCEDURE — 3066F PR DOCUMENTATION OF TREATMENT FOR NEPHROPATHY: ICD-10-PCS | Mod: CPTII,S$GLB,, | Performed by: PHYSICIAN ASSISTANT

## 2022-12-19 PROCEDURE — 99214 PR OFFICE/OUTPT VISIT, EST, LEVL IV, 30-39 MIN: ICD-10-PCS | Mod: 25,S$GLB,, | Performed by: PHYSICIAN ASSISTANT

## 2022-12-19 PROCEDURE — 96372 THER/PROPH/DIAG INJ SC/IM: CPT | Mod: S$GLB,,, | Performed by: PHYSICIAN ASSISTANT

## 2022-12-19 PROCEDURE — 3051F HG A1C>EQUAL 7.0%<8.0%: CPT | Mod: CPTII,S$GLB,, | Performed by: PHYSICIAN ASSISTANT

## 2022-12-19 RX ORDER — AZITHROMYCIN 250 MG/1
TABLET, FILM COATED ORAL
Qty: 6 TABLET | Refills: 0 | Status: SHIPPED | OUTPATIENT
Start: 2022-12-19 | End: 2022-12-24

## 2022-12-19 RX ORDER — PHENTERMINE HYDROCHLORIDE 37.5 MG/1
37.5 TABLET ORAL
Qty: 30 TABLET | Refills: 1 | Status: SHIPPED | OUTPATIENT
Start: 2022-12-19 | End: 2023-02-17

## 2022-12-19 RX ORDER — DEXAMETHASONE SODIUM PHOSPHATE 4 MG/ML
8 INJECTION, SOLUTION INTRA-ARTICULAR; INTRALESIONAL; INTRAMUSCULAR; INTRAVENOUS; SOFT TISSUE ONCE
Status: COMPLETED | OUTPATIENT
Start: 2022-12-19 | End: 2022-12-19

## 2022-12-19 RX ADMIN — DEXAMETHASONE SODIUM PHOSPHATE 8 MG: 4 INJECTION, SOLUTION INTRA-ARTICULAR; INTRALESIONAL; INTRAMUSCULAR; INTRAVENOUS; SOFT TISSUE at 09:12

## 2022-12-19 NOTE — PROGRESS NOTES
SUBJECTIVE:    Patient ID: Nubia Flaherty is a 62 y.o. female.    Chief Complaint: Follow-up (3 mo f/u//no med bottles//cough and sinus drainage since Wednesday//declined flu vac//tc)    This is a 62-year-old female who presents for regular checkup and refills. Reports that she has been doing great with the weight loss. Down about 20 lbs in the past few months. A1c improved to 7.3%. She has some URI sxs. Barking cough. Got while keeping her grandson. No fever/chills. Day 5-6 now. Cough just lingering. Grandson ended up having to get on abx to fully clear it up. She has not required BP medication for the past couple years.      Telephone on 12/16/2022   Component Date Value Ref Range Status    Hemoglobin A1C 12/16/2022 7.3 (H)  <5.7 % of total Hgb Final   Patient Message on 12/12/2022   Component Date Value Ref Range Status    Cholesterol 12/13/2022 207 (H)  <200 mg/dL Final    HDL 12/13/2022 61  > OR = 50 mg/dL Final    Triglycerides 12/13/2022 141  <150 mg/dL Final    LDL Cholesterol 12/13/2022 121 (H)  mg/dL (calc) Final    HDL/Cholesterol Ratio 12/13/2022 3.4  <5.0 (calc) Final    Non HDL Chol. (LDL+VLDL) 12/13/2022 146 (H)  <130 mg/dL (calc) Final    Glucose 12/13/2022 155 (H)  65 - 99 mg/dL Final    BUN 12/13/2022 14  7 - 25 mg/dL Final    Creatinine 12/13/2022 0.86  0.50 - 1.05 mg/dL Final    eGFR 12/13/2022 75  > OR = 60 mL/min/1.73m2 Final    BUN/Creatinine Ratio 12/13/2022 NOT APPLICABLE  6 - 22 (calc) Final    Sodium 12/13/2022 141  135 - 146 mmol/L Final    Potassium 12/13/2022 4.2  3.5 - 5.3 mmol/L Final    Chloride 12/13/2022 107  98 - 110 mmol/L Final    CO2 12/13/2022 26  20 - 32 mmol/L Final    Calcium 12/13/2022 9.5  8.6 - 10.4 mg/dL Final    Total Protein 12/13/2022 6.3  6.1 - 8.1 g/dL Final    Albumin 12/13/2022 4.0  3.6 - 5.1 g/dL Final    Globulin, Total 12/13/2022 2.3  1.9 - 3.7 g/dL (calc) Final    Albumin/Globulin Ratio 12/13/2022 1.7  1.0 - 2.5 (calc) Final    Total Bilirubin 12/13/2022 0.5   0.2 - 1.2 mg/dL Final    Alkaline Phosphatase 12/13/2022 48  37 - 153 U/L Final    AST 12/13/2022 15  10 - 35 U/L Final    ALT 12/13/2022 18  6 - 29 U/L Final   Office Visit on 07/25/2022   Component Date Value Ref Range Status    Hemoglobin A1C 07/25/2022 7.7  % Final    Cologuard Result 09/06/2022 Negative  Negative Final   Patient Message on 07/21/2022   Component Date Value Ref Range Status    Glucose 07/22/2022 188 (H)  65 - 99 mg/dL Final    BUN 07/22/2022 16  7 - 25 mg/dL Final    Creatinine 07/22/2022 0.99  0.50 - 1.05 mg/dL Final    eGFR 07/22/2022 64  > OR = 60 mL/min/1.73m2 Final    BUN/Creatinine Ratio 07/22/2022 NOT APPLICABLE  6 - 22 (calc) Final    Sodium 07/22/2022 140  135 - 146 mmol/L Final    Potassium 07/22/2022 4.4  3.5 - 5.3 mmol/L Final    Chloride 07/22/2022 105  98 - 110 mmol/L Final    CO2 07/22/2022 26  20 - 32 mmol/L Final    Calcium 07/22/2022 9.9  8.6 - 10.4 mg/dL Final    Total Protein 07/22/2022 6.7  6.1 - 8.1 g/dL Final    Albumin 07/22/2022 4.1  3.6 - 5.1 g/dL Final    Globulin, Total 07/22/2022 2.6  1.9 - 3.7 g/dL (calc) Final    Albumin/Globulin Ratio 07/22/2022 1.6  1.0 - 2.5 (calc) Final    Total Bilirubin 07/22/2022 0.7  0.2 - 1.2 mg/dL Final    Alkaline Phosphatase 07/22/2022 54  37 - 153 U/L Final    AST 07/22/2022 14  10 - 35 U/L Final    ALT 07/22/2022 21  6 - 29 U/L Final    Cholesterol 07/22/2022 211 (H)  <200 mg/dL Final    HDL 07/22/2022 55  > OR = 50 mg/dL Final    Triglycerides 07/22/2022 200 (H)  <150 mg/dL Final    LDL Cholesterol 07/22/2022 124 (H)  mg/dL (calc) Final    HDL/Cholesterol Ratio 07/22/2022 3.8  <5.0 (calc) Final    Non HDL Chol. (LDL+VLDL) 07/22/2022 156 (H)  <130 mg/dL (calc) Final    Hemoglobin A1C 07/22/2022 7.9 (H)  <5.7 % of total Hgb Final       Past Medical History:   Diagnosis Date    Diabetes mellitus     Hiatal hernia     Hypercholesteremia     Kidney stones     Kidney stones 11/01/2021    removed kidney stone left side    Migraine      Retina disorder     left eye cysts     Past Surgical History:   Procedure Laterality Date    BREAST CYST ASPIRATION      CYSTOSCOPY W/ URETERAL STENT PLACEMENT Left 10/25/2021    Procedure: CYSTOSCOPY, WITH URETERAL STENT INSERTION;  Surgeon: Yonatan Max MD;  Location: [facility] OR;  Service: Urology;  Laterality: Left;    CYSTOURETEROSCOPY WITH RETROGRADE PYELOGRAPHY AND INSERTION OF STENT INTO URETER Left 10/17/2021    Procedure: CYSTOURETEROSCOPY, WITH RETROGRADE PYELOGRAM AND URETERAL STENT INSERTION;  Surgeon: Yonatan Max MD;  Location: [facility] OR;  Service: Urology;  Laterality: Left;    FOOT SURGERY Left 2015    bunion     laryngocele  06/2009    TONSILLECTOMY, ADENOIDECTOMY      URETEROSCOPY Left 10/25/2021    Procedure: URETEROSCOPY;  Surgeon: Yonatan Max MD;  Location: [facility] OR;  Service: Urology;  Laterality: Left;     Family History   Problem Relation Age of Onset    Heart disease Father     Diabetes Father     COPD Father     Heart disease Mother     Diabetes Mother     COPD Mother     Kidney disease Mother     Arthritis Mother     Breast cancer Maternal Aunt     Heart disease Brother     Heart disease Brother     Drug abuse Brother     Ovarian cancer Neg Hx        Marital Status:   Alcohol History:  reports current alcohol use.  Tobacco History:  reports that she has never smoked. She has never used smokeless tobacco.  Drug History:  reports no history of drug use.    Review of patient's allergies indicates:   Allergen Reactions    Codeine Tinitus    Morphine Other (See Comments)     Severe migrain       Current Outpatient Medications:     ascorbic acid, vitamin C, (VITAMIN C) 500 MG tablet, Take 500 mg by mouth once daily., Disp: , Rfl:     azithromycin (Z-JAIME) 250 MG tablet, Take 2 tablets by mouth on day 1; Take 1 tablet by mouth on days 2-5, Disp: 6 tablet, Rfl: 0    CALCIUM-MAGNESIUM-ZINC ORAL, Take 1 tablet by mouth once daily., Disp: , Rfl:     coenzyme Q10 100 mg capsule,  Take 100 mg by mouth once daily., Disp: , Rfl:     eletriptan (RELPAX) 40 MG tablet, Take 40 mg by mouth once as needed (migraine. may repeat dose in 2 hours if no relief.). , Disp: , Rfl:     ezetimibe (ZETIA) 10 mg tablet, Take 1 tablet (10 mg total) by mouth once daily., Disp: 90 tablet, Rfl: 3    glipiZIDE (GLUCOTROL) 10 MG tablet, Take 1 tablet (10 mg total) by mouth 2 (two) times daily before meals., Disp: 90 tablet, Rfl: 1    metFORMIN (GLUCOPHAGE-XR) 500 MG ER 24hr tablet, Take 2 tablets (1,000 mg total) by mouth 2 (two) times daily with meals., Disp: 360 tablet, Rfl: 1    multivitamin (THERAGRAN) per tablet, Take 1 tablet by mouth once daily., Disp: , Rfl:     phentermine (ADIPEX-P) 37.5 mg tablet, Take 1 tablet (37.5 mg total) by mouth before breakfast., Disp: 30 tablet, Rfl: 1    turmeric/turmeric ext/pepr ext (TURMERIC-TURMERIC EXT-PEPPER) 500-3 mg Cap, Take 1 capsule by mouth once daily., Disp: , Rfl:     vit A/vit C/vit E/zinc/copper (ICAPS AREDS ORAL), Take by mouth once daily., Disp: , Rfl:     Current Facility-Administered Medications:     dexAMETHasone injection 8 mg, 8 mg, Intramuscular, Once, Robert Suarez PA-C    Facility-Administered Medications Ordered in Other Visits:     HYDROmorphone injection 0.5 mg, 0.5 mg, Intravenous, Q5 Min PRN, Jacqueline Volpi-Abadie, MD    lactated ringers infusion, , Intravenous, Continuous, Crystal Mayes MD, Stopped at 10/25/21 1658    LIDOcaine (PF) 10 mg/ml (1%) injection 10 mg, 1 mL, Intradermal, Once, Crystal Mayes MD    lorazepam injection 0.25 mg, 0.25 mg, Intravenous, Q15 Min PRN, Jacqueline Volpi-Abadie, MD    ondansetron injection 4 mg, 4 mg, Intravenous, Daily PRN, Jacqueline Volpi-Abadie, MD    oxyCODONE-acetaminophen 5-325 mg per tablet 1 tablet, 1 tablet, Oral, Q3H PRN, Jacqueline Volpi-Abadie, MD    sodium chloride 0.9% flush 3 mL, 3 mL, Intravenous, PRN, Jacqueline Volpi-Abadie, MD    Review of Systems   Constitutional:  Negative for  "appetite change, chills, fatigue, fever and unexpected weight change.   HENT:  Negative for congestion.    Respiratory:  Negative for cough, chest tightness and shortness of breath.    Cardiovascular:  Negative for chest pain and palpitations.   Gastrointestinal:  Negative for abdominal distention and abdominal pain.   Endocrine: Negative for cold intolerance and heat intolerance.   Genitourinary:  Negative for difficulty urinating and dysuria.   Musculoskeletal:  Negative for arthralgias and back pain.   Neurological:  Negative for dizziness, weakness and headaches.        Objective:      Vitals:    12/19/22 0845   BP: 116/73   Pulse: 100   SpO2: 98%   Weight: 87.5 kg (193 lb)   Height: 5' 7" (1.702 m)     Physical Exam  Constitutional:       General: She is not in acute distress.     Appearance: She is well-developed.   HENT:      Head: Normocephalic and atraumatic.   Eyes:      Conjunctiva/sclera: Conjunctivae normal.      Pupils: Pupils are equal, round, and reactive to light.   Neck:      Thyroid: No thyromegaly.   Cardiovascular:      Rate and Rhythm: Normal rate and regular rhythm.      Heart sounds: Normal heart sounds.   Pulmonary:      Effort: Pulmonary effort is normal.      Breath sounds: Normal breath sounds.   Abdominal:      General: Bowel sounds are normal. There is no distension.      Palpations: Abdomen is soft.      Tenderness: There is no abdominal tenderness.   Musculoskeletal:         General: Normal range of motion.      Cervical back: Normal range of motion and neck supple.   Skin:     General: Skin is warm and dry.      Findings: No erythema.   Neurological:      Mental Status: She is alert and oriented to person, place, and time.      Cranial Nerves: No cranial nerve deficit.         Assessment:       1. Type 2 diabetes mellitus without complication, without long-term current use of insulin    2. Upper respiratory tract infection, unspecified type    3. Essential hypertension    4. " Hyperlipidemia, unspecified hyperlipidemia type    5. Obesity (BMI 30.0-34.9)         Plan:       Type 2 diabetes mellitus without complication, without long-term current use of insulin  Comments:  A1c is 7.3% and very close to goal. will maintain as is.    Upper respiratory tract infection, unspecified type  Comments:  2cc deacdron in clinic. rest and push fluids start abx if no better in about 3-4 days.   Orders:  -     azithromycin (Z-JAIME) 250 MG tablet; Take 2 tablets by mouth on day 1; Take 1 tablet by mouth on days 2-5  Dispense: 6 tablet; Refill: 0  -     dexAMETHasone injection 8 mg    Essential hypertension  Comments:  BP remains well controlled. continue as is. Will monitor closely while on phentermine for the last month.    Hyperlipidemia, unspecified hyperlipidemia type  Comments:  stable, continue as is.    Obesity (BMI 30.0-34.9)  -     phentermine (ADIPEX-P) 37.5 mg tablet; Take 1 tablet (37.5 mg total) by mouth before breakfast.  Dispense: 30 tablet; Refill: 1      Follow up in about 4 months (around 4/19/2023) for Diabetic Check-Up.        12/19/2022 Robert Suarez PA-C

## 2023-03-15 DIAGNOSIS — Z12.31 OTHER SCREENING MAMMOGRAM: ICD-10-CM

## 2023-04-11 ENCOUNTER — PATIENT MESSAGE (OUTPATIENT)
Dept: ADMINISTRATIVE | Facility: HOSPITAL | Age: 63
End: 2023-04-11
Payer: MEDICARE

## 2023-04-17 ENCOUNTER — PATIENT MESSAGE (OUTPATIENT)
Dept: ADMINISTRATIVE | Facility: HOSPITAL | Age: 63
End: 2023-04-17
Payer: MEDICARE

## 2023-05-01 DIAGNOSIS — E11.9 TYPE 2 DIABETES MELLITUS WITHOUT COMPLICATION, WITHOUT LONG-TERM CURRENT USE OF INSULIN: ICD-10-CM

## 2023-05-01 DIAGNOSIS — E78.5 HYPERLIPIDEMIA, UNSPECIFIED HYPERLIPIDEMIA TYPE: ICD-10-CM

## 2023-05-01 RX ORDER — GLIPIZIDE 10 MG/1
10 TABLET ORAL
Qty: 90 TABLET | Refills: 1 | Status: SHIPPED | OUTPATIENT
Start: 2023-05-01 | End: 2023-08-22 | Stop reason: SDUPTHER

## 2023-05-01 RX ORDER — EZETIMIBE 10 MG/1
10 TABLET ORAL DAILY
Qty: 90 TABLET | Refills: 3 | Status: SHIPPED | OUTPATIENT
Start: 2023-05-01 | End: 2024-04-30

## 2023-06-02 ENCOUNTER — PATIENT MESSAGE (OUTPATIENT)
Dept: FAMILY MEDICINE | Facility: CLINIC | Age: 63
End: 2023-06-02

## 2023-08-08 ENCOUNTER — PATIENT MESSAGE (OUTPATIENT)
Dept: FAMILY MEDICINE | Facility: CLINIC | Age: 63
End: 2023-08-08

## 2023-08-08 DIAGNOSIS — E78.5 HYPERLIPIDEMIA, UNSPECIFIED HYPERLIPIDEMIA TYPE: ICD-10-CM

## 2023-08-08 DIAGNOSIS — Z79.899 ENCOUNTER FOR LONG-TERM (CURRENT) USE OF OTHER MEDICATIONS: Primary | ICD-10-CM

## 2023-08-08 DIAGNOSIS — Z00.00 ROUTINE MEDICAL EXAM: ICD-10-CM

## 2023-08-08 DIAGNOSIS — I10 ESSENTIAL HYPERTENSION: ICD-10-CM

## 2023-08-08 DIAGNOSIS — E11.9 TYPE 2 DIABETES MELLITUS WITHOUT COMPLICATION, WITHOUT LONG-TERM CURRENT USE OF INSULIN: ICD-10-CM

## 2023-08-15 LAB
ALBUMIN SERPL-MCNC: 3.9 G/DL (ref 3.6–5.1)
ALBUMIN/GLOB SERPL: 1.4 (CALC) (ref 1–2.5)
ALP SERPL-CCNC: 53 U/L (ref 37–153)
ALT SERPL-CCNC: 23 U/L (ref 6–29)
AST SERPL-CCNC: 16 U/L (ref 10–35)
BASOPHILS # BLD AUTO: 31 CELLS/UL (ref 0–200)
BASOPHILS NFR BLD AUTO: 0.6 %
BILIRUB SERPL-MCNC: 0.5 MG/DL (ref 0.2–1.2)
BUN SERPL-MCNC: 10 MG/DL (ref 7–25)
BUN/CREAT SERPL: ABNORMAL (CALC) (ref 6–22)
CALCIUM SERPL-MCNC: 9.1 MG/DL (ref 8.6–10.4)
CHLORIDE SERPL-SCNC: 108 MMOL/L (ref 98–110)
CHOLEST SERPL-MCNC: 178 MG/DL
CHOLEST/HDLC SERPL: 3.3 (CALC)
CO2 SERPL-SCNC: 27 MMOL/L (ref 20–32)
CREAT SERPL-MCNC: 0.8 MG/DL (ref 0.5–1.05)
EGFR: 82 ML/MIN/1.73M2
EOSINOPHIL # BLD AUTO: 203 CELLS/UL (ref 15–500)
EOSINOPHIL NFR BLD AUTO: 3.9 %
ERYTHROCYTE [DISTWIDTH] IN BLOOD BY AUTOMATED COUNT: 13.6 % (ref 11–15)
GLOBULIN SER CALC-MCNC: 2.7 G/DL (CALC) (ref 1.9–3.7)
GLUCOSE SERPL-MCNC: 162 MG/DL (ref 65–99)
HCT VFR BLD AUTO: 42 % (ref 35–45)
HDLC SERPL-MCNC: 54 MG/DL
HGB BLD-MCNC: 13.9 G/DL (ref 11.7–15.5)
LDLC SERPL CALC-MCNC: 95 MG/DL (CALC)
LYMPHOCYTES # BLD AUTO: 2090 CELLS/UL (ref 850–3900)
LYMPHOCYTES NFR BLD AUTO: 40.2 %
MCH RBC QN AUTO: 30 PG (ref 27–33)
MCHC RBC AUTO-ENTMCNC: 33.1 G/DL (ref 32–36)
MCV RBC AUTO: 90.7 FL (ref 80–100)
MONOCYTES # BLD AUTO: 442 CELLS/UL (ref 200–950)
MONOCYTES NFR BLD AUTO: 8.5 %
NEUTROPHILS # BLD AUTO: 2434 CELLS/UL (ref 1500–7800)
NEUTROPHILS NFR BLD AUTO: 46.8 %
NONHDLC SERPL-MCNC: 124 MG/DL (CALC)
PLATELET # BLD AUTO: 266 THOUSAND/UL (ref 140–400)
PMV BLD REES-ECKER: 9.7 FL (ref 7.5–12.5)
POTASSIUM SERPL-SCNC: 4.2 MMOL/L (ref 3.5–5.3)
PROT SERPL-MCNC: 6.6 G/DL (ref 6.1–8.1)
RBC # BLD AUTO: 4.63 MILLION/UL (ref 3.8–5.1)
SODIUM SERPL-SCNC: 143 MMOL/L (ref 135–146)
TRIGL SERPL-MCNC: 196 MG/DL
TSH SERPL-ACNC: 2.43 MIU/L (ref 0.4–4.5)
WBC # BLD AUTO: 5.2 THOUSAND/UL (ref 3.8–10.8)

## 2023-08-21 ENCOUNTER — OFFICE VISIT (OUTPATIENT)
Dept: PODIATRY | Facility: CLINIC | Age: 63
End: 2023-08-21
Payer: MEDICARE

## 2023-08-21 ENCOUNTER — HOSPITAL ENCOUNTER (OUTPATIENT)
Dept: RADIOLOGY | Facility: CLINIC | Age: 63
Discharge: HOME | End: 2023-08-21
Attending: PODIATRIST
Payer: MEDICARE

## 2023-08-21 VITALS — HEIGHT: 67 IN | HEART RATE: 68 BPM | WEIGHT: 198.88 LBS | BODY MASS INDEX: 31.21 KG/M2 | OXYGEN SATURATION: 98 %

## 2023-08-21 DIAGNOSIS — M21.619 BUNION: Primary | ICD-10-CM

## 2023-08-21 DIAGNOSIS — L85.1 ACQUIRED KERATODERMA: ICD-10-CM

## 2023-08-21 DIAGNOSIS — M79.672 LEFT FOOT PAIN: ICD-10-CM

## 2023-08-21 PROCEDURE — 1159F MED LIST DOCD IN RCRD: CPT | Mod: CPTII,S$GLB,, | Performed by: PODIATRIST

## 2023-08-21 PROCEDURE — 99999 PR PBB SHADOW E&M-EST. PATIENT-LVL III: CPT | Mod: PBBFAC,,, | Performed by: PODIATRIST

## 2023-08-21 PROCEDURE — 1160F PR REVIEW ALL MEDS BY PRESCRIBER/CLIN PHARMACIST DOCUMENTED: ICD-10-PCS | Mod: CPTII,S$GLB,, | Performed by: PODIATRIST

## 2023-08-21 PROCEDURE — 99999 PR PBB SHADOW E&M-EST. PATIENT-LVL III: ICD-10-PCS | Mod: PBBFAC,,, | Performed by: PODIATRIST

## 2023-08-21 PROCEDURE — 3008F BODY MASS INDEX DOCD: CPT | Mod: CPTII,S$GLB,, | Performed by: PODIATRIST

## 2023-08-21 PROCEDURE — 99203 OFFICE O/P NEW LOW 30 MIN: CPT | Mod: S$GLB,,, | Performed by: PODIATRIST

## 2023-08-21 PROCEDURE — 73630 X-RAY EXAM OF FOOT: CPT | Mod: LT,S$GLB,, | Performed by: RADIOLOGY

## 2023-08-21 PROCEDURE — 73630 XR FOOT COMPLETE 3 VIEW LEFT: ICD-10-PCS | Mod: LT,S$GLB,, | Performed by: RADIOLOGY

## 2023-08-21 PROCEDURE — 99203 PR OFFICE/OUTPT VISIT, NEW, LEVL III, 30-44 MIN: ICD-10-PCS | Mod: S$GLB,,, | Performed by: PODIATRIST

## 2023-08-21 PROCEDURE — 1160F RVW MEDS BY RX/DR IN RCRD: CPT | Mod: CPTII,S$GLB,, | Performed by: PODIATRIST

## 2023-08-21 PROCEDURE — 3008F PR BODY MASS INDEX (BMI) DOCUMENTED: ICD-10-PCS | Mod: CPTII,S$GLB,, | Performed by: PODIATRIST

## 2023-08-21 PROCEDURE — 1159F PR MEDICATION LIST DOCUMENTED IN MEDICAL RECORD: ICD-10-PCS | Mod: CPTII,S$GLB,, | Performed by: PODIATRIST

## 2023-08-21 NOTE — PROGRESS NOTES
"  [address] [facility] [address]  [place] [address]  Phone: [phone]   [phone]    Patient's PCP:Robert Suarez PA-C  Referring Provider: Judit Self    Subjective:      Chief Complaint:: Bunions (Left great toe ) and Callouses (Left great toe)    HPI  Nubia Flaherty is a 63 y.o. female who presents today with a complaint of left great toe "twisting" and callus. The current episode started years.  The symptoms include none. Probable cause of complaint none, had previous bunion surgery.  The symptoms are aggravated by small shoes or tennis shoes . The problem has stayed the same. Treatment to date have included none which provided no relief.       Vitals:    08/21/23 1431   Pulse: 68   SpO2: 98%   Weight: 90.2 kg (198 lb 13.7 oz)   Height: 5' 7" (1.702 m)   PainSc: 0-No pain      Shoe Size: 9-10    Past Surgical History:   Procedure Laterality Date    BREAST CYST ASPIRATION      CYSTOSCOPY W/ URETERAL STENT PLACEMENT Left 10/25/2021    Procedure: CYSTOSCOPY, WITH URETERAL STENT INSERTION;  Surgeon: Yonatan Max MD;  Location: [facility] OR;  Service: Urology;  Laterality: Left;    CYSTOURETEROSCOPY WITH RETROGRADE PYELOGRAPHY AND INSERTION OF STENT INTO URETER Left 10/17/2021    Procedure: CYSTOURETEROSCOPY, WITH RETROGRADE PYELOGRAM AND URETERAL STENT INSERTION;  Surgeon: Yonatan Max MD;  Location: [facility] OR;  Service: Urology;  Laterality: Left;    FOOT SURGERY Left 2015    bunion     laryngocele  06/2009    TONSILLECTOMY, ADENOIDECTOMY      URETEROSCOPY Left 10/25/2021    Procedure: URETEROSCOPY;  Surgeon: Yonatan Max MD;  Location: [facility] OR;  Service: Urology;  Laterality: Left;     Past Medical History:   Diagnosis Date    Diabetes mellitus     Hiatal hernia     Hypercholesteremia     Kidney stones     Kidney stones 11/01/2021    removed kidney stone left side    Migraine     Retina disorder     left eye cysts     Family History   Problem Relation Age of Onset    Heart disease " Father     Diabetes Father     COPD Father     Heart disease Mother     Diabetes Mother     COPD Mother     Kidney disease Mother     Arthritis Mother     Breast cancer Maternal Aunt     Heart disease Brother     Heart disease Brother     Drug abuse Brother     Ovarian cancer Neg Hx         Social History:   Marital Status:   Alcohol History:  reports current alcohol use.  Tobacco History:  reports that she has never smoked. She has never used smokeless tobacco.  Drug History:  reports no history of drug use.    Review of patient's allergies indicates:   Allergen Reactions    Codeine Tinitus    Morphine Other (See Comments)     Severe migrain       Current Outpatient Medications   Medication Sig Dispense Refill    ascorbic acid, vitamin C, (VITAMIN C) 500 MG tablet Take 500 mg by mouth once daily.      coenzyme Q10 100 mg capsule Take 100 mg by mouth once daily.      eletriptan (RELPAX) 40 MG tablet Take 40 mg by mouth once as needed (migraine. may repeat dose in 2 hours if no relief.).       ezetimibe (ZETIA) 10 mg tablet Take 1 tablet (10 mg total) by mouth once daily. 90 tablet 3    glipiZIDE (GLUCOTROL) 10 MG tablet Take 1 tablet (10 mg total) by mouth 2 (two) times daily before meals. 90 tablet 1    multivitamin (THERAGRAN) per tablet Take 1 tablet by mouth once daily.      turmeric/turmeric ext/pepr ext (TURMERIC-TURMERIC EXT-PEPPER) 500-3 mg Cap Take 1 capsule by mouth once daily.      CALCIUM-MAGNESIUM-ZINC ORAL Take 1 tablet by mouth once daily.      metFORMIN (GLUCOPHAGE-XR) 500 MG ER 24hr tablet Take 2 tablets (1,000 mg total) by mouth 2 (two) times daily with meals. 360 tablet 1    vit A/vit C/vit E/zinc/copper (ICAPS AREDS ORAL) Take by mouth once daily.       No current facility-administered medications for this visit.     Facility-Administered Medications Ordered in Other Visits   Medication Dose Route Frequency Provider Last Rate Last Admin    HYDROmorphone injection 0.5 mg  0.5 mg Intravenous  Q5 Min PRN Volpi-Abadie, Jacqueline, MD        lactated ringers infusion   Intravenous Continuous Crystal Mayes MD   Stopped at 10/25/21 1658    LIDOcaine (PF) 10 mg/ml (1%) injection 10 mg  1 mL Intradermal Once Crystal Mayes MD        lorazepam injection 0.25 mg  0.25 mg Intravenous Q15 Min PRN Volpi-Abadie, Jacqueline, MD        ondansetron injection 4 mg  4 mg Intravenous Daily PRN Volpi-Abadie, Jacqueline, MD        oxyCODONE-acetaminophen 5-325 mg per tablet 1 tablet  1 tablet Oral Q3H PRN Volpi-Abadie, Jacqueline, MD        sodium chloride 0.9% flush 3 mL  3 mL Intravenous PRN Volpi-Abadie, Jacqueline, MD           Review of Systems   Constitutional:  Negative for chills, fatigue, fever and unexpected weight change.   HENT:  Negative for hearing loss and trouble swallowing.    Eyes:  Negative for photophobia and visual disturbance.   Respiratory:  Negative for cough, shortness of breath and wheezing.    Cardiovascular:  Negative for chest pain, palpitations and leg swelling.   Gastrointestinal:  Negative for abdominal pain and nausea.   Genitourinary:  Negative for dysuria and frequency.   Musculoskeletal:  Positive for arthralgias and joint swelling. Negative for back pain, gait problem, myalgias and neck pain.   Skin:  Negative for rash and wound.   Neurological:  Negative for tremors, seizures, weakness, numbness and headaches.   Hematological:  Does not bruise/bleed easily.         Objective:        Physical Exam:   Foot Exam    General  General Appearance: appears stated age and healthy   Orientation: alert and oriented to person, place, and time   Affect: appropriate   Gait: unimpaired       Left Foot/Ankle      Inspection and Palpation  Ecchymosis: none  Tenderness: none   Swelling: none   Arch: normal  Hammertoes: absent  Claw toes: absent  Hallux valgus: yes  Hallux limitus: no  Skin Exam: callus; no drainage, no ulcer and no erythema   Neurovascular  Dorsalis pedis: 2+  Posterior tibial:  2+  Capillary refill: 2+  Varicose veins: not present  Saphenous nerve sensation: normal  Tibial nerve sensation: normal  Superficial peroneal nerve sensation: normal  Deep peroneal nerve sensation: normal  Sural nerve sensation: normal    Muscle Strength  Ankle dorsiflexion: 5  Ankle plantar flexion: 5  Ankle inversion: 5  Ankle eversion: 5  Great toe extension: 5  Great toe flexion: 5    Range of Motion    Normal left ankle ROM    Tests  Anterior drawer: negative   Talar tilt: negative   PT Tinel's sign: negative  Paresthesia: negative  Comments  Mild bunion deformity is present.  There is valgus rotation of the great toe.    Physical Exam  Cardiovascular:      Pulses:           Dorsalis pedis pulses are 2+ on the left side.        Posterior tibial pulses are 2+ on the left side.   Musculoskeletal:      Left foot: Bunion present.   Feet:      Left foot:      Skin integrity: Callus present. No ulcer or erythema.               Left Ankle/Foot Exam     Range of Motion   The patient has normal left ankle ROM.     Comments:  Mild bunion deformity is present.  There is valgus rotation of the great toe.      Muscle Strength   Left Lower Extremity   Ankle Dorsiflexion:  5   Plantar flexion:  5/5     Vascular Exam       Left Pulses  Dorsalis Pedis:      2+  Posterior Tibial:      2+           Imaging: X-Ray Foot Complete Left  Narrative: EXAMINATION:  XR FOOT COMPLETE 3 VIEW LEFT    CLINICAL HISTORY:  .  Pain in left foot    TECHNIQUE:  AP, lateral and oblique views of the left foot were performed.    COMPARISON:  10/25/2018    FINDINGS:  There has been osteotomy of the 1st metatarsal with 2 screws.  There has been osteotomy of the 2nd metatarsal with a single screw.  The screws are intact and engaged.  Good alignment is present.  There is mild hallux valgus.  A small plantar heel spur is present.  There is mild forefoot soft tissue swelling.  Impression: As above    Electronically signed by: Toni Isaac  MD  Date:    08/21/2023  Time:    15:08               Assessment:       1. Bunion    2. Left foot pain    3. Acquired keratoderma      Plan:   Bunion    Left foot pain  -     X-Ray Foot Complete Left    Acquired keratoderma      Follow up if symptoms worsen or fail to improve.    Procedures        I discussed the patient that she has recurrence of her bunion deformity.  I explained that this is possible with the previous surgical procedure she had performed.  I explained conservative treatment of proper shoe gear and toe separators.  I explained that unfortunately the only way to fully correct this would be through further surgical intervention.    I recommend urea cream 40% for her left foot calluses.    Counseling:     I provided patient education verbally regarding:   Patient diagnosis, treatment options, as well as alternatives, risks, and benefits.     This note was created using Dragon voice recognition software that occasionally misinterpreted phrases or words.

## 2023-08-22 ENCOUNTER — OFFICE VISIT (OUTPATIENT)
Dept: FAMILY MEDICINE | Facility: CLINIC | Age: 63
End: 2023-08-22
Payer: MEDICARE

## 2023-08-22 DIAGNOSIS — E11.9 TYPE 2 DIABETES MELLITUS WITHOUT COMPLICATION, WITHOUT LONG-TERM CURRENT USE OF INSULIN: Primary | ICD-10-CM

## 2023-08-22 DIAGNOSIS — I10 ESSENTIAL HYPERTENSION: ICD-10-CM

## 2023-08-22 DIAGNOSIS — E78.5 HYPERLIPIDEMIA, UNSPECIFIED HYPERLIPIDEMIA TYPE: ICD-10-CM

## 2023-08-22 LAB — HBA1C MFR BLD: 7.2 %

## 2023-08-22 PROCEDURE — 83036 POCT HEMOGLOBIN A1C: ICD-10-PCS | Mod: QW,,, | Performed by: PHYSICIAN ASSISTANT

## 2023-08-22 PROCEDURE — 1159F MED LIST DOCD IN RCRD: CPT | Mod: CPTII,S$GLB,, | Performed by: PHYSICIAN ASSISTANT

## 2023-08-22 PROCEDURE — 99214 PR OFFICE/OUTPT VISIT, EST, LEVL IV, 30-39 MIN: ICD-10-PCS | Mod: S$GLB,,, | Performed by: PHYSICIAN ASSISTANT

## 2023-08-22 PROCEDURE — 1159F PR MEDICATION LIST DOCUMENTED IN MEDICAL RECORD: ICD-10-PCS | Mod: CPTII,S$GLB,, | Performed by: PHYSICIAN ASSISTANT

## 2023-08-22 PROCEDURE — 99214 OFFICE O/P EST MOD 30 MIN: CPT | Mod: S$GLB,,, | Performed by: PHYSICIAN ASSISTANT

## 2023-08-22 PROCEDURE — 83036 HEMOGLOBIN GLYCOSYLATED A1C: CPT | Mod: QW,,, | Performed by: PHYSICIAN ASSISTANT

## 2023-08-22 RX ORDER — SEMAGLUTIDE 0.68 MG/ML
0.5 INJECTION, SOLUTION SUBCUTANEOUS
Qty: 3 ML | Refills: 5 | Status: SHIPPED | OUTPATIENT
Start: 2023-08-22 | End: 2023-11-13 | Stop reason: SDUPTHER

## 2023-08-22 RX ORDER — METFORMIN HYDROCHLORIDE 500 MG/1
1000 TABLET, EXTENDED RELEASE ORAL 2 TIMES DAILY WITH MEALS
Qty: 360 TABLET | Refills: 1 | Status: SHIPPED | OUTPATIENT
Start: 2023-08-22 | End: 2024-02-18

## 2023-08-22 RX ORDER — GLIPIZIDE 10 MG/1
10 TABLET ORAL
Qty: 90 TABLET | Refills: 1 | Status: SHIPPED | OUTPATIENT
Start: 2023-08-22 | End: 2023-12-22

## 2023-08-22 NOTE — PATIENT INSTRUCTIONS
Bunion    You have a bunion. This is a bony bump at the base of your big toe, along the inside edge of your foot. As the bump gets bigger, it can become red, swollen, and painful with shoe wear.  Bunions may occur if you wear shoes that are too tight and pinch your toes together. High heels may make this worse. In some cases a bunion is due to poor alignment of the foot and ankle. This puts extra weight on the instep of each foot.  Once a bunion forms, it changes the way weight is spread all across your foot. This causes the bunion to get worse over time. The big toe will bend more and more toward the other toes.  A minor bunion can be treated by:  Wearing properly fitting shoes  Using bunion pads  Wearing shoe inserts, called orthotics, to better align the foot and ankle  Physical therapy with ultrasound or whirlpool baths can ease pain, redness, and swelling. Severe cases may require surgery. If you dont treat what is causing the bunion, it may get larger and more painful.  Home care  Limit high heels. These shoes force your foot forward, crowding the toes together.  Switch to comfortable shoes with a wide toe area. Or have your existing shoes stretched by a shoe repair shop.  Avoid shoes that are tight, narrow, or pointed.  If you are flat-footed, using arch supports may help prevent further deformity. The best shoe inserts are the ones custom made by a foot specialist, called a podiatrist, or other healthcare provider.  Put a bunion pad over the bunion to ease pressure of your shoe against the bunion. You can buy these pads at most pharmacies without a prescription  To reduce pain and swelling, apply an ice pack over the injured area for 15 to 20 minutes. Do this every 1 to 2 hours the first day. Keep using ice 3 to 4 times a day until the pain and swelling goes away.  To make an ice pack, put ice cubes in a sealed zip-lock plastic bag. Wrap the bag in a clean, thin towel or cloth. Never put ice or an ice pack  directly on the skin.  You may use over-the-counter pain medicine to control pain, unless another medicine was prescribed. Talk with your provider before using these medicines if you have chronic liver or kidney disease, or ever had a stomach ulcer or GI (gastrointestinal) bleeding.  Follow-up care  Follow up with a podiatrist or foot doctor, or as advised.  If X-rays were taken, you will be notified of any new findings that may affect your care.  When to seek medical care  Contact your healthcare provider if any of the following occur:  Increasing pain or redness around the base of the big toe  Painful ingrown toenail, with redness and swelling or pus around the nail  Date Last Reviewed: 11/21/2015  © 3642-1103 [employer]. [address] [place] [address], [place] [address]. All rights reserved. This information is not intended as a substitute for professional medical care. Always follow your healthcare professional's instructions.     What is a Corn? What is a Callus?    Corns and calluses are areas of thickened skin that develop to protect that area from irritation. They occur when something rubs against the foot repeatedly or causes excess pressure against part of the foot. The term callus commonly is used if the thickening of skin occurs on the bottom of the foot, and if thickening occurs on the top of the foot (or toe), it's called a corn. However, the location of the thickened skin is less important than the pattern of thickening: flat, widespread skin thickening indicates a callus, and skin lesions that are thicker or deeper indicate a corn.    Corns and calluses are not contagious but may become painful if they get too thick. In people with diabetes or decreased circulation, they can lead to more serious foot problems.      Causes  Corns often occur where a toe rubs against the interior of a shoe. Excessive pressure at the balls of the feet--common in women who regularly wear high heels--may cause  calluses to develop on the balls of the feet.    People with certain deformities of the foot, such as hammer toes, are prone to corns and calluses.      Symptoms  Corns and calluses typically have a rough, dull appearance. They may be raised or rounded, and they can be hard to differentiate from warts. Corns or calluses sometimes cause pain.      Home Care  Mild corns and calluses may not require treatment. If the corn or callus isn't bothering you, it can probably be left alone. It's a good idea, though, to investigate possible causes of the corn or callus. If your footwear is contributing to the development of a corn or callus, it's time to look for other shoes.    Over-the-counter treatments can do more harm than good, especially if you have any medical conditions such as diabetes. Some over-the-counter treatments contain harsh chemicals, which can lead to burns or even foot ulcers.       When to Visit a Podiatrist  If corns or calluses are causing pain and discomfort or inhibiting your daily life in any way, see a podiatrist. Also, people with diabetes, poor circulation, or other serious illnesses should have their feet checked.      Diagnosis and Treatment  The podiatrist will conduct a complete examination of your feet. X-rays may be taken; your podiatrist may also want to inspect your shoes and watch you walk. He or she will also take a complete medical history. Corns and calluses are diagnosed based on appearance and history.    If you have mild corns or calluses, your podiatrist may suggest changing your shoes and/or adding padding to your shoes. Larger corns and calluses are most effectively reduced (made smaller) with a surgical blade. A podiatrist can use the blade to carefully shave away the thickened, dead skin--right in the office. The procedure is painless because the skin is already dead. Additional treatments may be needed if the corn or callus recurs.    Cortisone injections into the foot or toe  may be given if the corn or callus is causing significant pain. Surgery may be necessary in cases that do not respond to conservative treatment.      Prevention  Wear properly fitted shoes. If you have any deformities of the toe or foot, talk to your podiatrist to find out what shoes are best for you.  Gel pad inserts may decrease friction points and pressure. Your podiatrist can help you determine where pads might be useful.

## 2023-08-22 NOTE — PROGRESS NOTES
SUBJECTIVE:    Patient ID: Nubia Flaherty is a 63 y.o. female.    Chief Complaint: Diabetes    This is a 63 year old female presenting for 8 month diabetic follow up. A1c is 7.2% today; 7.3% previously. Reports feeling well overall. 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 She does this about 3 days a week. States that diet has been poor. She wishes to lose more weight than she has in the past year. Also states that she takes half of the daily dose of metformin and glipizide currently. Requesting to see if she can start ozempic now that she has new insurance. No other concerns at this time.        Patient Message on 08/08/2023   Component Date Value Ref Range Status    Glucose 08/14/2023 162 (H)  65 - 99 mg/dL Final    BUN 08/14/2023 10  7 - 25 mg/dL Final    Creatinine 08/14/2023 0.80  0.50 - 1.05 mg/dL Final    eGFR 08/14/2023 82  > OR = 60 mL/min/1.73m2 Final    BUN/Creatinine Ratio 08/14/2023 SEE NOTE:  6 - 22 (calc) Final    Sodium 08/14/2023 143  135 - 146 mmol/L Final    Potassium 08/14/2023 4.2  3.5 - 5.3 mmol/L Final    Chloride 08/14/2023 108  98 - 110 mmol/L Final    CO2 08/14/2023 27  20 - 32 mmol/L Final    Calcium 08/14/2023 9.1  8.6 - 10.4 mg/dL Final    Total Protein 08/14/2023 6.6  6.1 - 8.1 g/dL Final    Albumin 08/14/2023 3.9  3.6 - 5.1 g/dL Final    Globulin, Total 08/14/2023 2.7  1.9 - 3.7 g/dL (calc) Final    Albumin/Globulin Ratio 08/14/2023 1.4  1.0 - 2.5 (calc) Final    Total Bilirubin 08/14/2023 0.5  0.2 - 1.2 mg/dL Final    Alkaline Phosphatase 08/14/2023 53  37 - 153 U/L Final    AST 08/14/2023 16  10 - 35 U/L Final    ALT 08/14/2023 23  6 - 29 U/L Final    Cholesterol 08/14/2023 178  <200 mg/dL Final    HDL 08/14/2023 54  > OR = 50 mg/dL Final    Triglycerides 08/14/2023 196 (H)  <150 mg/dL Final    LDL Cholesterol 08/14/2023 95  mg/dL (calc) Final    HDL/Cholesterol Ratio 08/14/2023 3.3  <5.0  (calc) Final    Non HDL Chol. (LDL+VLDL) 08/14/2023 124  <130 mg/dL (calc) Final    TSH w/reflex to FT4 08/14/2023 2.43  0.40 - 4.50 mIU/L Final    WBC 08/14/2023 5.2  3.8 - 10.8 Thousand/uL Final    RBC 08/14/2023 4.63  3.80 - 5.10 Million/uL Final    Hemoglobin 08/14/2023 13.9  11.7 - 15.5 g/dL Final    Hematocrit 08/14/2023 42.0  35.0 - 45.0 % Final    MCV 08/14/2023 90.7  80.0 - 100.0 fL Final    MCH 08/14/2023 30.0  27.0 - 33.0 pg Final    MCHC 08/14/2023 33.1  32.0 - 36.0 g/dL Final    RDW 08/14/2023 13.6  11.0 - 15.0 % Final    Platelets 08/14/2023 266  140 - 400 Thousand/uL Final    MPV 08/14/2023 9.7  7.5 - 12.5 fL Final    Neutrophils, Abs 08/14/2023 2,434  1,500 - 7,800 cells/uL Final    Lymph # 08/14/2023 2,090  850 - 3,900 cells/uL Final    Mono # 08/14/2023 442  200 - 950 cells/uL Final    Eos # 08/14/2023 203  15 - 500 cells/uL Final    Baso # 08/14/2023 31  0 - 200 cells/uL Final    Neutrophils Relative 08/14/2023 46.8  % Final    Lymph % 08/14/2023 40.2  % Final    Mono % 08/14/2023 8.5  % Final    Eosinophil % 08/14/2023 3.9  % Final    Basophil % 08/14/2023 0.6  % Final       Past Medical History:   Diagnosis Date    Diabetes mellitus     Hiatal hernia     Hypercholesteremia     Kidney stones     Kidney stones 11/01/2021    removed kidney stone left side    Migraine     Retina disorder     left eye cysts     Social History     Socioeconomic History    Marital status:    Tobacco Use    Smoking status: Never    Smokeless tobacco: Never   Substance and Sexual Activity    Alcohol use: Yes     Comment: rarely    Drug use: Never    Sexual activity: Yes     Partners: Male     Social Determinants of Health     Stress: Stress Concern Present (10/28/2019)    [facility] - Occupational Stress Questionnaire     Feeling of Stress : To some extent     Past Surgical History:   Procedure Laterality Date    BREAST CYST ASPIRATION      CYSTOSCOPY W/ URETERAL STENT PLACEMENT Left  10/25/2021    Procedure: CYSTOSCOPY, WITH URETERAL STENT INSERTION;  Surgeon: Yonatan Max MD;  Location: [facility] OR;  Service: Urology;  Laterality: Left;    CYSTOURETEROSCOPY WITH RETROGRADE PYELOGRAPHY AND INSERTION OF STENT INTO URETER Left 10/17/2021    Procedure: CYSTOURETEROSCOPY, WITH RETROGRADE PYELOGRAM AND URETERAL STENT INSERTION;  Surgeon: Yonatan Max MD;  Location: [facility] OR;  Service: Urology;  Laterality: Left;    FOOT SURGERY Left 2015    bunion     laryngocele  06/2009    TONSILLECTOMY, ADENOIDECTOMY      URETEROSCOPY Left 10/25/2021    Procedure: URETEROSCOPY;  Surgeon: Yonatan Max MD;  Location: [facility] OR;  Service: Urology;  Laterality: Left;     Family History   Problem Relation Age of Onset    Heart disease Father     Diabetes Father     COPD Father     Heart disease Mother     Diabetes Mother     COPD Mother     Kidney disease Mother     Arthritis Mother     Breast cancer Maternal Aunt     Heart disease Brother     Heart disease Brother     Drug abuse Brother     Ovarian cancer Neg Hx        Review of patient's allergies indicates:   Allergen Reactions    Codeine Tinitus    Morphine Other (See Comments)     Severe migrain       Current Outpatient Medications:     ascorbic acid, vitamin C, (VITAMIN C) 500 MG tablet, Take 500 mg by mouth once daily., Disp: , Rfl:     coenzyme Q10 100 mg capsule, Take 100 mg by mouth once daily., Disp: , Rfl:     eletriptan (RELPAX) 40 MG tablet, Take 40 mg by mouth once as needed (migraine. may repeat dose in 2 hours if no relief.). , Disp: , Rfl:     ezetimibe (ZETIA) 10 mg tablet, Take 1 tablet (10 mg total) by mouth once daily., Disp: 90 tablet, Rfl: 3    multivitamin (THERAGRAN) per tablet, Take 1 tablet by mouth once daily., Disp: , Rfl:     turmeric/turmeric ext/pepr ext (TURMERIC-TURMERIC EXT-PEPPER) 500-3 mg Cap, Take 1 capsule by mouth once daily., Disp: , Rfl:     glipiZIDE (GLUCOTROL) 10 MG tablet, Take 1 tablet (10 mg total) by mouth  "daily with breakfast., Disp: 90 tablet, Rfl: 1    metFORMIN (GLUCOPHAGE-XR) 500 MG ER 24hr tablet, Take 2 tablets (1,000 mg total) by mouth 2 (two) times daily with meals., Disp: 360 tablet, Rfl: 1    semaglutide (OZEMPIC) 0.25 mg or 0.5 mg (2 mg/3 mL) pen injector, Inject 0.5 mg into the skin every 7 days., Disp: 3 mL, Rfl: 5  No current facility-administered medications for this visit.    Facility-Administered Medications Ordered in Other Visits:     HYDROmorphone injection 0.5 mg, 0.5 mg, Intravenous, Q5 Min PRN, Volpi-Abadie, Jacqueline, MD    lactated ringers infusion, , Intravenous, Continuous, Crystal Mayes MD, Stopped at 10/25/21 1658    LIDOcaine (PF) 10 mg/ml (1%) injection 10 mg, 1 mL, Intradermal, Once, Crystal Mayes MD    lorazepam injection 0.25 mg, 0.25 mg, Intravenous, Q15 Min PRN, Volpi-Abadie, Jacqueline, MD    ondansetron injection 4 mg, 4 mg, Intravenous, Daily PRN, Volpi-Abadie, Jacqueline, MD    oxyCODONE-acetaminophen 5-325 mg per tablet 1 tablet, 1 tablet, Oral, Q3H PRN, Volpi-Abadie, Jacqueline, MD    sodium chloride 0.9% flush 3 mL, 3 mL, Intravenous, PRN, Volpi-Abadie, Jacqueline, MD    Review of Systems   Constitutional:  Negative for appetite change, chills, fatigue, fever and unexpected weight change.   HENT:  Negative for congestion.    Respiratory:  Negative for cough, chest tightness and shortness of breath.    Cardiovascular:  Negative for chest pain and palpitations.   Gastrointestinal:  Negative for abdominal distention and abdominal pain.   Endocrine: Negative for cold intolerance and heat intolerance.   Genitourinary:  Negative for difficulty urinating and dysuria.   Musculoskeletal:  Negative for arthralgias and back pain.   Neurological:  Negative for dizziness, weakness and headaches.          Objective:      Vitals:    08/22/23 1432   BP: (P) 130/82   Pulse: (P) 82   Resp: (P) 16   SpO2: (P) 96%   Weight: (P) 89.7 kg (197 lb 12.8 oz)   Height: (P) 5' 7" (1.702 m) "     Physical Exam  Constitutional:       General: She is not in acute distress.     Appearance: She is well-developed.   HENT:      Head: Normocephalic and atraumatic.   Eyes:      Conjunctiva/sclera: Conjunctivae normal.      Pupils: Pupils are equal, round, and reactive to light.   Neck:      Thyroid: No thyromegaly.   Cardiovascular:      Rate and Rhythm: Normal rate and regular rhythm.      Heart sounds: Normal heart sounds.   Pulmonary:      Effort: Pulmonary effort is normal.      Breath sounds: Normal breath sounds.   Abdominal:      General: Bowel sounds are normal. There is no distension.      Palpations: Abdomen is soft.      Tenderness: There is no abdominal tenderness.   Musculoskeletal:         General: Normal range of motion.      Cervical back: Normal range of motion and neck supple.      Right lower leg: No edema.      Left lower leg: No edema.   Skin:     General: Skin is warm and dry.      Findings: No erythema.   Neurological:      Mental Status: She is alert and oriented to person, place, and time.      Cranial Nerves: No cranial nerve deficit.           Assessment:       1. Type 2 diabetes mellitus without complication, without long-term current use of insulin    2. Essential hypertension    3. Hyperlipidemia, unspecified hyperlipidemia type         Plan:       Type 2 diabetes mellitus without complication, without long-term current use of insulin  Comments:  A1c is improved slightly to 7.2% but still not at goal. will add ozempic now and see her back in 3 mos.   Orders:  -     POCT HEMOGLOBIN A1C  -     semaglutide (OZEMPIC) 0.25 mg or 0.5 mg (2 mg/3 mL) pen injector; Inject 0.5 mg into the skin every 7 days.  Dispense: 3 mL; Refill: 5  -     metFORMIN (GLUCOPHAGE-XR) 500 MG ER 24hr tablet; Take 2 tablets (1,000 mg total) by mouth 2 (two) times daily with meals.  Dispense: 360 tablet; Refill: 1  -     glipiZIDE (GLUCOTROL) 10 MG tablet; Take 1 tablet (10 mg total) by mouth daily with breakfast.   Dispense: 90 tablet; Refill: 1    Essential hypertension  Comments:  BP is at goal. continue as is.    Hyperlipidemia, unspecified hyperlipidemia type  Comments:  LDL improved. will maintain zetia as is.      Follow up in about 3 months (around 11/22/2023) for Diabetic Check-Up.        8/22/2023 Robert Suarez

## 2023-08-23 LAB
ALBUMIN/CREAT UR: NORMAL MCG/MG CREAT
APPEARANCE UR: CLEAR
BACTERIA #/AREA URNS HPF: NORMAL /HPF
BACTERIA UR CULT: NORMAL
BILIRUB UR QL STRIP: NEGATIVE
COLOR UR: YELLOW
CREAT UR-MCNC: 58 MG/DL (ref 20–275)
GLUCOSE UR QL STRIP: NEGATIVE
HGB UR QL STRIP: NEGATIVE
HYALINE CASTS #/AREA URNS LPF: NORMAL /LPF
KETONES UR QL STRIP: NEGATIVE
LEUKOCYTE ESTERASE UR QL STRIP: NEGATIVE
MICROALBUMIN UR-MCNC: <0.2 MG/DL
NITRITE UR QL STRIP: NEGATIVE
PH UR STRIP: 5.5 [PH] (ref 5–8)
PROT UR QL STRIP: NEGATIVE
RBC #/AREA URNS HPF: NORMAL /HPF
SERVICE CMNT-IMP: NORMAL
SP GR UR STRIP: 1.01 (ref 1–1.03)
SQUAMOUS #/AREA URNS HPF: NORMAL /HPF
WBC #/AREA URNS HPF: NORMAL /HPF

## 2023-09-20 DIAGNOSIS — Z78.0 ASYMPTOMATIC MENOPAUSAL STATE: ICD-10-CM

## 2023-09-21 ENCOUNTER — OFFICE VISIT (OUTPATIENT)
Dept: OBSTETRICS AND GYNECOLOGY | Facility: CLINIC | Age: 63
End: 2023-09-21
Payer: MEDICARE

## 2023-09-21 VITALS
DIASTOLIC BLOOD PRESSURE: 88 MMHG | HEIGHT: 67 IN | WEIGHT: 195.13 LBS | BODY MASS INDEX: 30.63 KG/M2 | SYSTOLIC BLOOD PRESSURE: 134 MMHG

## 2023-09-21 DIAGNOSIS — Z12.31 ENCOUNTER FOR SCREENING MAMMOGRAM FOR MALIGNANT NEOPLASM OF BREAST: ICD-10-CM

## 2023-09-21 DIAGNOSIS — Z01.419 WELL WOMAN EXAM: Primary | ICD-10-CM

## 2023-09-21 PROCEDURE — 99999 PR PBB SHADOW E&M-EST. PATIENT-LVL III: ICD-10-PCS | Mod: PBBFAC,,, | Performed by: STUDENT IN AN ORGANIZED HEALTH CARE EDUCATION/TRAINING PROGRAM

## 2023-09-21 PROCEDURE — 3066F NEPHROPATHY DOC TX: CPT | Mod: CPTII,S$GLB,, | Performed by: STUDENT IN AN ORGANIZED HEALTH CARE EDUCATION/TRAINING PROGRAM

## 2023-09-21 PROCEDURE — 1101F PT FALLS ASSESS-DOCD LE1/YR: CPT | Mod: CPTII,S$GLB,, | Performed by: STUDENT IN AN ORGANIZED HEALTH CARE EDUCATION/TRAINING PROGRAM

## 2023-09-21 PROCEDURE — 3051F HG A1C>EQUAL 7.0%<8.0%: CPT | Mod: CPTII,S$GLB,, | Performed by: STUDENT IN AN ORGANIZED HEALTH CARE EDUCATION/TRAINING PROGRAM

## 2023-09-21 PROCEDURE — 1159F MED LIST DOCD IN RCRD: CPT | Mod: CPTII,S$GLB,, | Performed by: STUDENT IN AN ORGANIZED HEALTH CARE EDUCATION/TRAINING PROGRAM

## 2023-09-21 PROCEDURE — G0101 PR CA SCREEN;PELVIC/BREAST EXAM: ICD-10-PCS | Mod: S$GLB,,, | Performed by: STUDENT IN AN ORGANIZED HEALTH CARE EDUCATION/TRAINING PROGRAM

## 2023-09-21 PROCEDURE — 3288F FALL RISK ASSESSMENT DOCD: CPT | Mod: CPTII,S$GLB,, | Performed by: STUDENT IN AN ORGANIZED HEALTH CARE EDUCATION/TRAINING PROGRAM

## 2023-09-21 PROCEDURE — 1159F PR MEDICATION LIST DOCUMENTED IN MEDICAL RECORD: ICD-10-PCS | Mod: CPTII,S$GLB,, | Performed by: STUDENT IN AN ORGANIZED HEALTH CARE EDUCATION/TRAINING PROGRAM

## 2023-09-21 PROCEDURE — G0101 CA SCREEN;PELVIC/BREAST EXAM: HCPCS | Mod: S$GLB,,, | Performed by: STUDENT IN AN ORGANIZED HEALTH CARE EDUCATION/TRAINING PROGRAM

## 2023-09-21 PROCEDURE — 87624 HPV HI-RISK TYP POOLED RSLT: CPT | Performed by: STUDENT IN AN ORGANIZED HEALTH CARE EDUCATION/TRAINING PROGRAM

## 2023-09-21 PROCEDURE — 3079F PR MOST RECENT DIASTOLIC BLOOD PRESSURE 80-89 MM HG: ICD-10-PCS | Mod: CPTII,S$GLB,, | Performed by: STUDENT IN AN ORGANIZED HEALTH CARE EDUCATION/TRAINING PROGRAM

## 2023-09-21 PROCEDURE — 3008F BODY MASS INDEX DOCD: CPT | Mod: CPTII,S$GLB,, | Performed by: STUDENT IN AN ORGANIZED HEALTH CARE EDUCATION/TRAINING PROGRAM

## 2023-09-21 PROCEDURE — 3008F PR BODY MASS INDEX (BMI) DOCUMENTED: ICD-10-PCS | Mod: CPTII,S$GLB,, | Performed by: STUDENT IN AN ORGANIZED HEALTH CARE EDUCATION/TRAINING PROGRAM

## 2023-09-21 PROCEDURE — 88175 CYTOPATH C/V AUTO FLUID REDO: CPT | Performed by: STUDENT IN AN ORGANIZED HEALTH CARE EDUCATION/TRAINING PROGRAM

## 2023-09-21 PROCEDURE — 3051F PR MOST RECENT HEMOGLOBIN A1C LEVEL 7.0 - < 8.0%: ICD-10-PCS | Mod: CPTII,S$GLB,, | Performed by: STUDENT IN AN ORGANIZED HEALTH CARE EDUCATION/TRAINING PROGRAM

## 2023-09-21 PROCEDURE — 3079F DIAST BP 80-89 MM HG: CPT | Mod: CPTII,S$GLB,, | Performed by: STUDENT IN AN ORGANIZED HEALTH CARE EDUCATION/TRAINING PROGRAM

## 2023-09-21 PROCEDURE — 3075F PR MOST RECENT SYSTOLIC BLOOD PRESS GE 130-139MM HG: ICD-10-PCS | Mod: CPTII,S$GLB,, | Performed by: STUDENT IN AN ORGANIZED HEALTH CARE EDUCATION/TRAINING PROGRAM

## 2023-09-21 PROCEDURE — 3061F PR NEG MICROALBUMINURIA RESULT DOCUMENTED/REVIEW: ICD-10-PCS | Mod: CPTII,S$GLB,, | Performed by: STUDENT IN AN ORGANIZED HEALTH CARE EDUCATION/TRAINING PROGRAM

## 2023-09-21 PROCEDURE — 3288F PR FALLS RISK ASSESSMENT DOCUMENTED: ICD-10-PCS | Mod: CPTII,S$GLB,, | Performed by: STUDENT IN AN ORGANIZED HEALTH CARE EDUCATION/TRAINING PROGRAM

## 2023-09-21 PROCEDURE — 3066F PR DOCUMENTATION OF TREATMENT FOR NEPHROPATHY: ICD-10-PCS | Mod: CPTII,S$GLB,, | Performed by: STUDENT IN AN ORGANIZED HEALTH CARE EDUCATION/TRAINING PROGRAM

## 2023-09-21 PROCEDURE — 99999 PR PBB SHADOW E&M-EST. PATIENT-LVL III: CPT | Mod: PBBFAC,,, | Performed by: STUDENT IN AN ORGANIZED HEALTH CARE EDUCATION/TRAINING PROGRAM

## 2023-09-21 PROCEDURE — 3061F NEG MICROALBUMINURIA REV: CPT | Mod: CPTII,S$GLB,, | Performed by: STUDENT IN AN ORGANIZED HEALTH CARE EDUCATION/TRAINING PROGRAM

## 2023-09-21 PROCEDURE — 3075F SYST BP GE 130 - 139MM HG: CPT | Mod: CPTII,S$GLB,, | Performed by: STUDENT IN AN ORGANIZED HEALTH CARE EDUCATION/TRAINING PROGRAM

## 2023-09-21 PROCEDURE — 1101F PR PT FALLS ASSESS DOC 0-1 FALLS W/OUT INJ PAST YR: ICD-10-PCS | Mod: CPTII,S$GLB,, | Performed by: STUDENT IN AN ORGANIZED HEALTH CARE EDUCATION/TRAINING PROGRAM

## 2023-09-21 NOTE — PROGRESS NOTES
History & Physical  Gynecology      SUBJECTIVE:     Chief Complaint: Well Woman and Establish Care       History of Present Illness:    Here today for well woman exam.    Gyn: no PMB, no HRT, no hx of abnormal pap. Not sexually active. No immediate FH of gyn or colon cancer    No tobacco     Moving to [place] soon   Review of patient's allergies indicates:   Allergen Reactions    Codeine Tinitus    Morphine Other (See Comments)     Severe migrain       Past Medical History:   Diagnosis Date    Diabetes mellitus     Hiatal hernia     Hypercholesteremia     Kidney stones     Kidney stones 2021    removed kidney stone left side    Migraine     Retina disorder     left eye cysts     Past Surgical History:   Procedure Laterality Date    BREAST CYST ASPIRATION      CYSTOSCOPY W/ URETERAL STENT PLACEMENT Left 10/25/2021    Procedure: CYSTOSCOPY, WITH URETERAL STENT INSERTION;  Surgeon: Yonatan Max MD;  Location: [facility] OR;  Service: Urology;  Laterality: Left;    CYSTOURETEROSCOPY WITH RETROGRADE PYELOGRAPHY AND INSERTION OF STENT INTO URETER Left 10/17/2021    Procedure: CYSTOURETEROSCOPY, WITH RETROGRADE PYELOGRAM AND URETERAL STENT INSERTION;  Surgeon: Yonatan Max MD;  Location: [facility] OR;  Service: Urology;  Laterality: Left;    FOOT SURGERY Left     bunion     laryngocele  2009    TONSILLECTOMY, ADENOIDECTOMY      URETEROSCOPY Left 10/25/2021    Procedure: URETEROSCOPY;  Surgeon: Yonatan Max MD;  Location: [facility] OR;  Service: Urology;  Laterality: Left;     OB History          3    Para   3    Term   3            AB        Living             SAB        IAB        Ectopic        Multiple        Live Births                   Family History   Problem Relation Age of Onset    Heart disease Father     Diabetes Father     COPD Father     Heart disease Mother     Diabetes Mother     COPD Mother     Kidney disease Mother     Arthritis Mother     Breast cancer Maternal Aunt      Heart disease Brother     Heart disease Brother     Drug abuse Brother     Ovarian cancer Neg Hx      Social History     Tobacco Use    Smoking status: Never    Smokeless tobacco: Never   Substance Use Topics    Alcohol use: Yes     Comment: rarely    Drug use: Never       Current Outpatient Medications   Medication Sig    ascorbic acid, vitamin C, (VITAMIN C) 500 MG tablet Take 500 mg by mouth once daily.    coenzyme Q10 100 mg capsule Take 100 mg by mouth once daily.    ezetimibe (ZETIA) 10 mg tablet Take 1 tablet (10 mg total) by mouth once daily.    metFORMIN (GLUCOPHAGE-XR) 500 MG ER 24hr tablet Take 2 tablets (1,000 mg total) by mouth 2 (two) times daily with meals.    multivitamin (THERAGRAN) per tablet Take 1 tablet by mouth once daily.    semaglutide (OZEMPIC) 0.25 mg or 0.5 mg (2 mg/3 mL) pen injector Inject 0.5 mg into the skin every 7 days.    turmeric/turmeric ext/pepr ext (TURMERIC-TURMERIC EXT-PEPPER) 500-3 mg Cap Take 1 capsule by mouth once daily.    eletriptan (RELPAX) 40 MG tablet Take 40 mg by mouth once as needed (migraine. may repeat dose in 2 hours if no relief.).     glipiZIDE (GLUCOTROL) 10 MG tablet Take 1 tablet (10 mg total) by mouth daily with breakfast. (Patient not taking: Reported on 9/21/2023)     No current facility-administered medications for this visit.     Facility-Administered Medications Ordered in Other Visits   Medication    HYDROmorphone injection 0.5 mg    lactated ringers infusion    LIDOcaine (PF) 10 mg/ml (1%) injection 10 mg    lorazepam injection 0.25 mg    ondansetron injection 4 mg    oxyCODONE-acetaminophen 5-325 mg per tablet 1 tablet    sodium chloride 0.9% flush 3 mL         Review of Systems:  Review of Systems   Constitutional:  Negative for chills, fatigue and fever.   HENT:  Negative for congestion.    Eyes:  Negative for visual disturbance.   Respiratory:  Negative for cough and shortness of breath.    Cardiovascular:  Negative for chest pain and  palpitations.   Gastrointestinal:  Negative for abdominal distention, abdominal pain, constipation, diarrhea, nausea and vomiting.   Genitourinary:  Negative for difficulty urinating, dysuria, hematuria, vaginal bleeding and vaginal discharge.   Skin:  Negative for rash.   Neurological:  Negative for dizziness, seizures, light-headedness and headaches.   Hematological:  Does not bruise/bleed easily.   Psychiatric/Behavioral:  Negative for dysphoric mood. The patient is not nervous/anxious.         OBJECTIVE:     Physical Exam:  Physical Exam  Vitals reviewed.   Constitutional:       General: She is not in acute distress.     Appearance: She is well-developed.   HENT:      Head: Normocephalic and atraumatic.   Cardiovascular:      Rate and Rhythm: Normal rate and regular rhythm.      Heart sounds: Normal heart sounds.   Pulmonary:      Effort: Pulmonary effort is normal.      Breath sounds: Normal breath sounds.   Abdominal:      General: Bowel sounds are normal. There is no distension.      Palpations: Abdomen is soft.   Genitourinary:     Vagina: Normal.   Skin:     General: Skin is warm.   Neurological:      Mental Status: She is alert and oriented to person, place, and time.   Psychiatric:         Behavior: Behavior normal.         Thought Content: Thought content normal.         Judgment: Judgment normal.           ASSESSMENT:       ICD-10-CM ICD-9-CM    1. Well woman exam  Z01.419 V72.31 Mammo Digital Screening Bilat      Liquid-Based Pap Smear, Screening      HPV High Risk Genotypes, PCR      2. Encounter for screening mammogram for malignant neoplasm of breast  Z12.31 V76.12 Mammo Digital Screening Bilat          Orders Placed This Encounter   Procedures    HPV High Risk Genotypes, PCR     Release to patient->Immediate     Order Specific Question:   Source     Answer:   Cervix     Order Specific Question:   Release to patient     Answer:   Immediate    Mammo Digital Screening Bilat     Standing Status:    Future     Standing Expiration Date:   11/20/2024           Plan:      Well woman:  - Pap cotest today, if normal will stop  - MMG ordered  - colonoscopy n/a, has done cologuard  - tobacco cessation n/a  - contraception n/a  - HPV vaccine n/a  - Safe Sex n/a  - DEXA ordered   - Counseled to take daily multivitamin. If patient is of reproductive age and not on contraception, to take prenatal vitamin. Patient has been counseled on the vitamin D and calcium requirements per ACOG recommendations.  Age   Calcium(mg/day)   Vitamin D (IU/day)  9-18    1300                       600  19-50  1000                       600  51-70  1200                       600  >70      1,200                      800  Patient to start vit D    Nataliya Acevedo M.D.  Obstetrics and Gynecology

## 2023-09-27 ENCOUNTER — HOSPITAL ENCOUNTER (OUTPATIENT)
Dept: RADIOLOGY | Facility: HOSPITAL | Age: 63
Discharge: HOME | End: 2023-09-27
Attending: STUDENT IN AN ORGANIZED HEALTH CARE EDUCATION/TRAINING PROGRAM
Payer: MEDICARE

## 2023-09-27 ENCOUNTER — HOSPITAL ENCOUNTER (OUTPATIENT)
Dept: RADIOLOGY | Facility: HOSPITAL | Age: 63
Discharge: HOME | End: 2023-09-27
Attending: PHYSICIAN ASSISTANT
Payer: MEDICARE

## 2023-09-27 DIAGNOSIS — Z78.0 ASYMPTOMATIC MENOPAUSAL STATE: ICD-10-CM

## 2023-09-27 DIAGNOSIS — Z01.419 WELL WOMAN EXAM: ICD-10-CM

## 2023-09-27 DIAGNOSIS — Z12.31 ENCOUNTER FOR SCREENING MAMMOGRAM FOR MALIGNANT NEOPLASM OF BREAST: ICD-10-CM

## 2023-09-27 PROCEDURE — 77080 DXA BONE DENSITY AXIAL: CPT | Mod: TC,PO

## 2023-09-27 PROCEDURE — 77067 SCR MAMMO BI INCL CAD: CPT | Mod: TC,PO

## 2023-09-28 LAB
FINAL PATHOLOGIC DIAGNOSIS: NORMAL
HPV HR 12 DNA SPEC QL NAA+PROBE: NEGATIVE
HPV16 AG SPEC QL: NEGATIVE
HPV18 DNA SPEC QL NAA+PROBE: NEGATIVE
Lab: NORMAL

## 2023-10-04 ENCOUNTER — PATIENT OUTREACH (OUTPATIENT)
Dept: ADMINISTRATIVE | Facility: HOSPITAL | Age: 63
End: 2023-10-04
Payer: MEDICARE

## 2023-10-04 NOTE — PROGRESS NOTES
Population Health Chart Review & Patient Outreach Details:     Reason for Outreach Encounter:     []  Non-Compliant Report   [x]  Payor Report ([employer], PHN, BCBS, MSSP, MCIP, UHC, etc.)   []  Pre-Visit Chart Review     Updates Requested / Reviewed:     []  Care Everywhere    []     []  External Sources ([facility], [facility], DIS, etc.)   [x]  Care Team Updated    Patient Outreach Method:    []  Telephone Outreach Completed   [] Successful   [] Left Voicemail   [] Unable to Contact (wrong number, no voicemail)  []  [employer] Portal Outreach Sent  []  Letter Outreach Mailed  []  Fax Sent for External Records  []  External Records Upload    Health Maintenance Topics Addressed and Outreach Outcomes / Actions Taken:        []      Breast Cancer Screening []  Mammo Scheduled      []  External Records Requested     []  Added Reminder to Complete to Upcoming Primary Care Appt Notes     []  Patient Declined     []  Patient Will Call Back to Schedule     []  Patient Will Schedule with External Provider / Order Routed if Applicable             []       Cervical Cancer Screening []  Pap Scheduled      []  External Records Requested     []  Added Reminder to Complete to Upcoming Primary Care Appt Notes     []  Patient Declined     []  Patient Will Call Back to Schedule     []  Patient Will Schedule with External Provider               []          Colorectal Cancer Screening []  Colonoscopy Case Request or Referral Placed     []  External Records Requested     []  Added Reminder to Complete to Upcoming Primary Care Appt Notes     []  Patient Declined     []  Patient Will Call Back to Schedule     []  Patient Will Schedule with External Provider     []  Fit Kit Mailed (add the SmartPhrase under additional notes)     []  Reminded Patient to Complete Home Test             []      Diabetic Eye Exam []  Eye Camera Scheduled or Optometry Referral Placed     []  External Records Requested     []  Added Reminder to Complete to  Upcoming Primary Care Appt Notes     []  Patient Declined     []  Patient Will Call Back to Schedule     []  Patient Will Schedule with External Provider             []      Blood Pressure Control []  Primary Care Follow Up Visit Scheduled     []  Remote Blood Pressure Reading Captured     []  Added Reminder to Complete to Upcoming Primary Care Appt Notes     []  Patient Declined     []  Patient Will Call Back / Patient Will Send Portal Message with Reading     []  Patient Will Call Back to Schedule Provider Visit             []       HbA1c & Other Labs []  Lab Appt Scheduled for Due Labs     []  Primary Care Follow Up Visit Scheduled      []  Reminded Patient to Complete Home Test     []  Added Reminder to Complete to Upcoming Primary Care Appt Notes     []  Patient Declined     []  Patient Will Call Back to Schedule     []  Patient Will Schedule with External Provider / Order Routed if Applicable           []    Schedule Primary Care Appt []  Primary Care Appt Scheduled     []  Patient Declined     []  Patient Will Call Back to Schedule     []  Pt Established with External Provider & Updated Care Team             [x]      Medication Adherence []  Primary Care Appointment Scheduled     []  Added Reminder to Upcoming Primary Care Appt Notes     []  Patient Reminded to  Prescription     []  Patient Declined, Provider Notified if Needed     [x]  Sent Provider Message to Review and/or Add Exclusion to Problem List             []      Osteoporosis Screening []  DXA Appointment Scheduled     []  External Records Requested     []  Added Reminder to Complete to Upcoming Primary Care Appt Notes     []  Patient Declined     []  Patient Will Call Back to Schedule     []  Patient Will Schedule with External Provider / Order Routed if Applicable     Additional Care Coordinator Notes:         Further Action Needed If Patient Returns Outreach:

## 2023-11-06 ENCOUNTER — PATIENT MESSAGE (OUTPATIENT)
Dept: FAMILY MEDICINE | Facility: CLINIC | Age: 63
End: 2023-11-06

## 2023-11-06 DIAGNOSIS — E11.9 TYPE 2 DIABETES MELLITUS WITHOUT COMPLICATION, WITHOUT LONG-TERM CURRENT USE OF INSULIN: ICD-10-CM

## 2023-11-13 RX ORDER — SEMAGLUTIDE 0.68 MG/ML
0.5 INJECTION, SOLUTION SUBCUTANEOUS
Qty: 3 ML | Refills: 5 | Status: SHIPPED | OUTPATIENT
Start: 2023-11-13 | End: 2024-05-11

## 2023-11-13 NOTE — TELEPHONE ENCOUNTER
Pt would like 90 day supply of ozempic printed out for . Rx order set up. Please adjust quantity.

## 2023-11-29 ENCOUNTER — TELEPHONE (OUTPATIENT)
Dept: FAMILY MEDICINE | Facility: CLINIC | Age: 63
End: 2023-11-29

## 2023-11-29 DIAGNOSIS — Z79.899 ENCOUNTER FOR LONG-TERM (CURRENT) USE OF OTHER MEDICATIONS: Primary | ICD-10-CM

## 2023-11-29 DIAGNOSIS — I10 ESSENTIAL HYPERTENSION: ICD-10-CM

## 2023-11-29 DIAGNOSIS — E78.5 HYPERLIPIDEMIA, UNSPECIFIED HYPERLIPIDEMIA TYPE: ICD-10-CM

## 2023-11-29 DIAGNOSIS — E11.9 TYPE 2 DIABETES MELLITUS WITHOUT COMPLICATION, WITHOUT LONG-TERM CURRENT USE OF INSULIN: ICD-10-CM

## 2023-12-19 LAB
ALBUMIN SERPL-MCNC: 4.2 G/DL (ref 3.6–5.1)
ALBUMIN/GLOB SERPL: 1.7 (CALC) (ref 1–2.5)
ALP SERPL-CCNC: 50 U/L (ref 37–153)
ALT SERPL-CCNC: 18 U/L (ref 6–29)
AST SERPL-CCNC: 14 U/L (ref 10–35)
BILIRUB SERPL-MCNC: 0.7 MG/DL (ref 0.2–1.2)
BUN SERPL-MCNC: 13 MG/DL (ref 7–25)
BUN/CREAT SERPL: ABNORMAL (CALC) (ref 6–22)
CALCIUM SERPL-MCNC: 9.7 MG/DL (ref 8.6–10.4)
CHLORIDE SERPL-SCNC: 107 MMOL/L (ref 98–110)
CHOLEST SERPL-MCNC: 215 MG/DL
CHOLEST/HDLC SERPL: 3.4 (CALC)
CO2 SERPL-SCNC: 25 MMOL/L (ref 20–32)
CREAT SERPL-MCNC: 1.01 MG/DL (ref 0.5–1.05)
EGFR: 62 ML/MIN/1.73M2
GLOBULIN SER CALC-MCNC: 2.5 G/DL (CALC) (ref 1.9–3.7)
GLUCOSE SERPL-MCNC: 159 MG/DL (ref 65–99)
HBA1C MFR BLD: 7.4 % OF TOTAL HGB
HDLC SERPL-MCNC: 63 MG/DL
LDLC SERPL CALC-MCNC: 123 MG/DL (CALC)
NONHDLC SERPL-MCNC: 152 MG/DL (CALC)
POTASSIUM SERPL-SCNC: 4.5 MMOL/L (ref 3.5–5.3)
PROT SERPL-MCNC: 6.7 G/DL (ref 6.1–8.1)
SODIUM SERPL-SCNC: 143 MMOL/L (ref 135–146)
TRIGL SERPL-MCNC: 177 MG/DL

## 2023-12-22 ENCOUNTER — PATIENT MESSAGE (OUTPATIENT)
Dept: ADMINISTRATIVE | Facility: OTHER | Age: 63
End: 2023-12-22
Payer: MEDICARE

## 2023-12-22 ENCOUNTER — OFFICE VISIT (OUTPATIENT)
Dept: FAMILY MEDICINE | Facility: CLINIC | Age: 63
End: 2023-12-22
Payer: MEDICARE

## 2023-12-22 VITALS
WEIGHT: 196 LBS | SYSTOLIC BLOOD PRESSURE: 120 MMHG | DIASTOLIC BLOOD PRESSURE: 70 MMHG | HEIGHT: 67 IN | HEART RATE: 92 BPM | BODY MASS INDEX: 30.76 KG/M2 | OXYGEN SATURATION: 98 %

## 2023-12-22 DIAGNOSIS — Z13.6 ENCOUNTER FOR SCREENING FOR CARDIOVASCULAR DISORDERS: Primary | ICD-10-CM

## 2023-12-22 DIAGNOSIS — E11.9 TYPE 2 DIABETES MELLITUS WITHOUT COMPLICATION, WITHOUT LONG-TERM CURRENT USE OF INSULIN: ICD-10-CM

## 2023-12-22 DIAGNOSIS — Z82.49 FAMILY HISTORY OF EARLY CAD: ICD-10-CM

## 2023-12-22 PROCEDURE — 1159F PR MEDICATION LIST DOCUMENTED IN MEDICAL RECORD: ICD-10-PCS | Mod: CPTII,S$GLB,, | Performed by: PHYSICIAN ASSISTANT

## 2023-12-22 PROCEDURE — 3066F NEPHROPATHY DOC TX: CPT | Mod: CPTII,S$GLB,, | Performed by: PHYSICIAN ASSISTANT

## 2023-12-22 PROCEDURE — 3074F PR MOST RECENT SYSTOLIC BLOOD PRESSURE < 130 MM HG: ICD-10-PCS | Mod: CPTII,S$GLB,, | Performed by: PHYSICIAN ASSISTANT

## 2023-12-22 PROCEDURE — 3051F PR MOST RECENT HEMOGLOBIN A1C LEVEL 7.0 - < 8.0%: ICD-10-PCS | Mod: CPTII,S$GLB,, | Performed by: PHYSICIAN ASSISTANT

## 2023-12-22 PROCEDURE — 3066F PR DOCUMENTATION OF TREATMENT FOR NEPHROPATHY: ICD-10-PCS | Mod: CPTII,S$GLB,, | Performed by: PHYSICIAN ASSISTANT

## 2023-12-22 PROCEDURE — 3061F PR NEG MICROALBUMINURIA RESULT DOCUMENTED/REVIEW: ICD-10-PCS | Mod: CPTII,S$GLB,, | Performed by: PHYSICIAN ASSISTANT

## 2023-12-22 PROCEDURE — 3008F PR BODY MASS INDEX (BMI) DOCUMENTED: ICD-10-PCS | Mod: CPTII,S$GLB,, | Performed by: PHYSICIAN ASSISTANT

## 2023-12-22 PROCEDURE — 3288F PR FALLS RISK ASSESSMENT DOCUMENTED: ICD-10-PCS | Mod: CPTII,S$GLB,, | Performed by: PHYSICIAN ASSISTANT

## 2023-12-22 PROCEDURE — 3288F FALL RISK ASSESSMENT DOCD: CPT | Mod: CPTII,S$GLB,, | Performed by: PHYSICIAN ASSISTANT

## 2023-12-22 PROCEDURE — 1101F PT FALLS ASSESS-DOCD LE1/YR: CPT | Mod: CPTII,S$GLB,, | Performed by: PHYSICIAN ASSISTANT

## 2023-12-22 PROCEDURE — 3051F HG A1C>EQUAL 7.0%<8.0%: CPT | Mod: CPTII,S$GLB,, | Performed by: PHYSICIAN ASSISTANT

## 2023-12-22 PROCEDURE — 99214 OFFICE O/P EST MOD 30 MIN: CPT | Mod: S$GLB,,, | Performed by: PHYSICIAN ASSISTANT

## 2023-12-22 PROCEDURE — 3074F SYST BP LT 130 MM HG: CPT | Mod: CPTII,S$GLB,, | Performed by: PHYSICIAN ASSISTANT

## 2023-12-22 PROCEDURE — 99214 PR OFFICE/OUTPT VISIT, EST, LEVL IV, 30-39 MIN: ICD-10-PCS | Mod: S$GLB,,, | Performed by: PHYSICIAN ASSISTANT

## 2023-12-22 PROCEDURE — 3061F NEG MICROALBUMINURIA REV: CPT | Mod: CPTII,S$GLB,, | Performed by: PHYSICIAN ASSISTANT

## 2023-12-22 PROCEDURE — 3008F BODY MASS INDEX DOCD: CPT | Mod: CPTII,S$GLB,, | Performed by: PHYSICIAN ASSISTANT

## 2023-12-22 PROCEDURE — 1159F MED LIST DOCD IN RCRD: CPT | Mod: CPTII,S$GLB,, | Performed by: PHYSICIAN ASSISTANT

## 2023-12-22 PROCEDURE — 1101F PR PT FALLS ASSESS DOC 0-1 FALLS W/OUT INJ PAST YR: ICD-10-PCS | Mod: CPTII,S$GLB,, | Performed by: PHYSICIAN ASSISTANT

## 2023-12-22 PROCEDURE — 3078F PR MOST RECENT DIASTOLIC BLOOD PRESSURE < 80 MM HG: ICD-10-PCS | Mod: CPTII,S$GLB,, | Performed by: PHYSICIAN ASSISTANT

## 2023-12-22 PROCEDURE — 3078F DIAST BP <80 MM HG: CPT | Mod: CPTII,S$GLB,, | Performed by: PHYSICIAN ASSISTANT

## 2023-12-22 NOTE — PROGRESS NOTES
SUBJECTIVE:    Patient ID: Nubia Flaherty is a 63 y.o. female.    Chief Complaint: Diabetes    This is a 63-year-old female who presents today for regular diabetic checkup.  Just had blood work completed in here to review.  Kidneys, liver, cholesterol all stable at this time.  A1c has increased slightly to 7.4%. She has a new granddaughter recently born and enjoying this stage. Does report that she has been eating pretty poorly. Knows that she is snacking too much. Going to try to curb this..Does get some good exercise with the dogs. Focusing on losing weight starting in Jan.        Telephone on 11/29/2023   Component Date Value Ref Range Status    Glucose 12/18/2023 159 (H)  65 - 99 mg/dL Final    BUN 12/18/2023 13  7 - 25 mg/dL Final    Creatinine 12/18/2023 1.01  0.50 - 1.05 mg/dL Final    eGFR 12/18/2023 62  > OR = 60 mL/min/1.73m2 Final    BUN/Creatinine Ratio 12/18/2023 SEE NOTE:  6 - 22 (calc) Final    Sodium 12/18/2023 143  135 - 146 mmol/L Final    Potassium 12/18/2023 4.5  3.5 - 5.3 mmol/L Final    Chloride 12/18/2023 107  98 - 110 mmol/L Final    CO2 12/18/2023 25  20 - 32 mmol/L Final    Calcium 12/18/2023 9.7  8.6 - 10.4 mg/dL Final    Total Protein 12/18/2023 6.7  6.1 - 8.1 g/dL Final    Albumin 12/18/2023 4.2  3.6 - 5.1 g/dL Final    Globulin, Total 12/18/2023 2.5  1.9 - 3.7 g/dL (calc) Final    Albumin/Globulin Ratio 12/18/2023 1.7  1.0 - 2.5 (calc) Final    Total Bilirubin 12/18/2023 0.7  0.2 - 1.2 mg/dL Final    Alkaline Phosphatase 12/18/2023 50  37 - 153 U/L Final    AST 12/18/2023 14  10 - 35 U/L Final    ALT 12/18/2023 18  6 - 29 U/L Final    Cholesterol 12/18/2023 215 (H)  <200 mg/dL Final    HDL 12/18/2023 63  > OR = 50 mg/dL Final    Triglycerides 12/18/2023 177 (H)  <150 mg/dL Final    LDL Cholesterol 12/18/2023 123 (H)  mg/dL (calc) Final    HDL/Cholesterol Ratio 12/18/2023 3.4  <5.0 (calc) Final    Non HDL Chol. (LDL+VLDL) 12/18/2023 152 (H)  <130 mg/dL (calc) Final    Hemoglobin A1C  12/18/2023 7.4 (H)  <5.7 % of total Hgb Final   Office Visit on 09/21/2023   Component Date Value Ref Range Status    Final Pathologic Diagnosis 09/21/2023    Final                    Value:Specimen Adequacy  Satisfactory for interpretation. Endocervical component is present.    [place] Category  Negative for intraepithelial lesion or malignancy.  Inflammation present.      Disclaimer 09/21/2023    Final                    Value:The Pap smear is a screening test that aids in the detection of cervical cancer and cancer precursors. Both false positive and false negative results can occur. The test should be used at regular intervals, and positive results should be confirmed before   definitive therapy.  This liquid based specimen is processed using the  or  Thin PrepPAP System. This specimen has been analyzed by the ThinPrep Imaging System (PROSimity), an automated imaging and review system which assists the laboratory in evaluating   cells on ThinPrep PAP tests. Following automated imaging, selected fields from every slide are reviewed by a cytotechnologist and/or pathologist.     Screening was performed at [facility], [address] [place] [id number].      HPV other High Risk types, PCR 09/21/2023 Negative  Negative Final    HPV High Risk type 16, PCR 09/21/2023 Negative  Negative Final    HPV High Risk type 18, PCR 09/21/2023 Negative  Negative Final   Office Visit on 08/22/2023   Component Date Value Ref Range Status    Hemoglobin A1C, POC 08/22/2023 7.2  % Final   Patient Message on 08/08/2023   Component Date Value Ref Range Status    Glucose 08/14/2023 162 (H)  65 - 99 mg/dL Final    BUN 08/14/2023 10  7 - 25 mg/dL Final    Creatinine 08/14/2023 0.80  0.50 - 1.05 mg/dL Final    eGFR 08/14/2023 82  > OR = 60 mL/min/1.73m2 Final    BUN/Creatinine Ratio 08/14/2023 SEE NOTE:  6 - 22 (calc) Final    Sodium 08/14/2023 143  135 - 146 mmol/L  Final    Potassium 08/14/2023 4.2  3.5 - 5.3 mmol/L Final    Chloride 08/14/2023 108  98 - 110 mmol/L Final    CO2 08/14/2023 27  20 - 32 mmol/L Final    Calcium 08/14/2023 9.1  8.6 - 10.4 mg/dL Final    Total Protein 08/14/2023 6.6  6.1 - 8.1 g/dL Final    Albumin 08/14/2023 3.9  3.6 - 5.1 g/dL Final    Globulin, Total 08/14/2023 2.7  1.9 - 3.7 g/dL (calc) Final    Albumin/Globulin Ratio 08/14/2023 1.4  1.0 - 2.5 (calc) Final    Total Bilirubin 08/14/2023 0.5  0.2 - 1.2 mg/dL Final    Alkaline Phosphatase 08/14/2023 53  37 - 153 U/L Final    AST 08/14/2023 16  10 - 35 U/L Final    ALT 08/14/2023 23  6 - 29 U/L Final    Cholesterol 08/14/2023 178  <200 mg/dL Final    HDL 08/14/2023 54  > OR = 50 mg/dL Final    Triglycerides 08/14/2023 196 (H)  <150 mg/dL Final    LDL Cholesterol 08/14/2023 95  mg/dL (calc) Final    HDL/Cholesterol Ratio 08/14/2023 3.3  <5.0 (calc) Final    Non HDL Chol. (LDL+VLDL) 08/14/2023 124  <130 mg/dL (calc) Final    Creatinine, Urine 08/22/2023 58  20 - 275 mg/dL Final    Microalb, Ur 08/22/2023 <0.2  See Note: mg/dL Final    Microalb/Creat Ratio 08/22/2023 NOTE  <30 mcg/mg creat Final    TSH w/reflex to FT4 08/14/2023 2.43  0.40 - 4.50 mIU/L Final    Color, UA 08/22/2023 YELLOW  YELLOW Final    Appearance, UA 08/22/2023 CLEAR  CLEAR Final    Specific Gravity, UA 08/22/2023 1.009  1.001 - 1.035 Final    pH, UA 08/22/2023 5.5  5.0 - 8.0 Final    Glucose, UA 08/22/2023 NEGATIVE  NEGATIVE Final    Bilirubin, UA 08/22/2023 NEGATIVE  NEGATIVE Final    Ketones, UA 08/22/2023 NEGATIVE  NEGATIVE Final    Occult Blood UA 08/22/2023 NEGATIVE  NEGATIVE Final    Protein, UA 08/22/2023 NEGATIVE  NEGATIVE Final    Nitrite, UA 08/22/2023 NEGATIVE  NEGATIVE Final    Leukocytes, UA 08/22/2023 NEGATIVE  NEGATIVE Final    WBC Casts, UA 08/22/2023 NONE SEEN  < OR = 5 /HPF Final    RBC Casts, UA 08/22/2023 NONE SEEN  < OR = 2 /HPF Final    Squam Epithel, UA 08/22/2023 NONE SEEN  < OR = 5 /HPF Final    Bacteria,  UA 08/22/2023 NONE SEEN  NONE SEEN /HPF Final    Hyaline Casts, UA 08/22/2023 NONE SEEN  NONE SEEN /LPF Final    Service Cmt: 08/22/2023    Final    Reflexive Urine Culture 08/22/2023    Final    WBC 08/14/2023 5.2  3.8 - 10.8 Thousand/uL Final    RBC 08/14/2023 4.63  3.80 - 5.10 Million/uL Final    Hemoglobin 08/14/2023 13.9  11.7 - 15.5 g/dL Final    Hematocrit 08/14/2023 42.0  35.0 - 45.0 % Final    MCV 08/14/2023 90.7  80.0 - 100.0 fL Final    MCH 08/14/2023 30.0  27.0 - 33.0 pg Final    MCHC 08/14/2023 33.1  32.0 - 36.0 g/dL Final    RDW 08/14/2023 13.6  11.0 - 15.0 % Final    Platelets 08/14/2023 266  140 - 400 Thousand/uL Final    MPV 08/14/2023 9.7  7.5 - 12.5 fL Final    Neutrophils, Abs 08/14/2023 2,434  1,500 - 7,800 cells/uL Final    Lymph # 08/14/2023 2,090  850 - 3,900 cells/uL Final    Mono # 08/14/2023 442  200 - 950 cells/uL Final    Eos # 08/14/2023 203  15 - 500 cells/uL Final    Baso # 08/14/2023 31  0 - 200 cells/uL Final    Neutrophils Relative 08/14/2023 46.8  % Final    Lymph % 08/14/2023 40.2  % Final    Mono % 08/14/2023 8.5  % Final    Eosinophil % 08/14/2023 3.9  % Final    Basophil % 08/14/2023 0.6  % Final       Past Medical History:   Diagnosis Date    Diabetes mellitus     Hiatal hernia     Hypercholesteremia     Kidney stones     Kidney stones 11/01/2021    removed kidney stone left side    Migraine     Retina disorder     left eye cysts     Past Surgical History:   Procedure Laterality Date    BREAST CYST ASPIRATION      CYSTOSCOPY W/ URETERAL STENT PLACEMENT Left 10/25/2021    Procedure: CYSTOSCOPY, WITH URETERAL STENT INSERTION;  Surgeon: Yonatan Max MD;  Location: [facility] OR;  Service: Urology;  Laterality: Left;    CYSTOURETEROSCOPY WITH RETROGRADE PYELOGRAPHY AND INSERTION OF STENT INTO URETER Left 10/17/2021    Procedure: CYSTOURETEROSCOPY, WITH RETROGRADE PYELOGRAM AND URETERAL STENT INSERTION;  Surgeon: Yonatan Max MD;  Location: [facility];  Service: Urology;   Laterality: Left;    FOOT SURGERY Left 2015    bunion     laryngocele  06/2009    TONSILLECTOMY, ADENOIDECTOMY      URETEROSCOPY Left 10/25/2021    Procedure: URETEROSCOPY;  Surgeon: Yonatan Max MD;  Location: [facility];  Service: Urology;  Laterality: Left;     Family History   Problem Relation Age of Onset    Heart disease Father     Diabetes Father     COPD Father     Heart disease Mother     Diabetes Mother     COPD Mother     Kidney disease Mother     Arthritis Mother     Breast cancer Maternal Aunt     Heart disease Brother     Heart disease Brother     Drug abuse Brother     Ovarian cancer Neg Hx        Marital Status:   Alcohol History:  reports current alcohol use.  Tobacco History:  reports that she has never smoked. She has been exposed to tobacco smoke. She has never used smokeless tobacco.  Drug History:  reports no history of drug use.    Review of patient's allergies indicates:   Allergen Reactions    Codeine Tinitus    Morphine Other (See Comments)     Severe migrain       Current Outpatient Medications:     ascorbic acid, vitamin C, (VITAMIN C) 500 MG tablet, Take 500 mg by mouth once daily., Disp: , Rfl:     coenzyme Q10 100 mg capsule, Take 100 mg by mouth once daily., Disp: , Rfl:     ezetimibe (ZETIA) 10 mg tablet, Take 1 tablet (10 mg total) by mouth once daily., Disp: 90 tablet, Rfl: 3    metFORMIN (GLUCOPHAGE-XR) 500 MG ER 24hr tablet, Take 2 tablets (1,000 mg total) by mouth 2 (two) times daily with meals., Disp: 360 tablet, Rfl: 1    multivitamin (THERAGRAN) per tablet, Take 1 tablet by mouth once daily., Disp: , Rfl:     semaglutide (OZEMPIC) 0.25 mg or 0.5 mg (2 mg/3 mL) pen injector, Inject 0.5 mg into the skin every 7 days., Disp: 3 mL, Rfl: 5    turmeric/turmeric ext/pepr ext (TURMERIC-TURMERIC EXT-PEPPER) 500-3 mg Cap, Take 1 capsule by mouth once daily., Disp: , Rfl:   No current facility-administered medications for this visit.    Facility-Administered Medications  "Ordered in Other Visits:     HYDROmorphone injection 0.5 mg, 0.5 mg, Intravenous, Q5 Min PRN, Volpi-Abadie, Jacqueline, MD    lactated ringers infusion, , Intravenous, Continuous, Crystal Mayes MD, Stopped at 10/25/21 1658    LIDOcaine (PF) 10 mg/ml (1%) injection 10 mg, 1 mL, Intradermal, Once, Crystal Mayes MD    lorazepam injection 0.25 mg, 0.25 mg, Intravenous, Q15 Min PRN, Volpi-Abadie, Jacqueline, MD    ondansetron injection 4 mg, 4 mg, Intravenous, Daily PRN, Volpi-Abadie, Jacqueline, MD    oxyCODONE-acetaminophen 5-325 mg per tablet 1 tablet, 1 tablet, Oral, Q3H PRN, Volpi-Abadie, Jacqueline, MD    sodium chloride 0.9% flush 3 mL, 3 mL, Intravenous, PRN, Volpi-Abadie, Jacqueline, MD    Review of Systems   Constitutional:  Negative for activity change and unexpected weight change.   HENT:  Negative for hearing loss, rhinorrhea and trouble swallowing.    Eyes:  Negative for discharge and visual disturbance.   Respiratory:  Negative for chest tightness and wheezing.    Cardiovascular:  Negative for chest pain and palpitations.   Gastrointestinal:  Negative for blood in stool, constipation, diarrhea and vomiting.   Endocrine: Negative for polydipsia and polyuria.   Genitourinary:  Negative for difficulty urinating, dysuria, hematuria and menstrual problem.   Musculoskeletal:  Negative for arthralgias, joint swelling and neck pain.   Neurological:  Negative for weakness and headaches.   Psychiatric/Behavioral:  Negative for confusion and dysphoric mood.           Objective:      Vitals:    12/22/23 0942   BP: 120/70   Pulse: 92   SpO2: 98%   Weight: 88.9 kg (196 lb)   Height: 5' 7" (1.702 m)     Physical Exam  Constitutional:       General: She is not in acute distress.     Appearance: She is well-developed.   HENT:      Head: Normocephalic and atraumatic.   Eyes:      Conjunctiva/sclera: Conjunctivae normal.      Pupils: Pupils are equal, round, and reactive to light.   Neck:      Thyroid: No " thyromegaly.   Cardiovascular:      Rate and Rhythm: Normal rate and regular rhythm.      Heart sounds: Normal heart sounds.   Pulmonary:      Effort: Pulmonary effort is normal.      Breath sounds: Normal breath sounds.   Abdominal:      General: Bowel sounds are normal. There is no distension.      Palpations: Abdomen is soft.      Tenderness: There is no abdominal tenderness.   Musculoskeletal:         General: Normal range of motion.      Cervical back: Normal range of motion and neck supple.   Skin:     General: Skin is warm and dry.      Findings: No erythema.   Neurological:      Mental Status: She is alert and oriented to person, place, and time.      Cranial Nerves: No cranial nerve deficit.           Assessment:       1. Encounter for screening for cardiovascular disorders    2. Type 2 diabetes mellitus without complication, without long-term current use of insulin    3. Family history of early CAD         Plan:       Encounter for screening for cardiovascular disorders  Comments:  given strong fhx of cad and being diabetic. she is agreeable to further eval with calcium score ct. can risk stratify and then see cards if needed.  Orders:  -     CT Cardiac Scoring; Future; Expected date: 12/22/2023    Type 2 diabetes mellitus without complication, without long-term current use of insulin  Comments:  A1c at 7.4% and will really focus on diet over the coming months.    Family history of early CAD  Comments:  would like to risk stratify.  Orders:  -     CT Cardiac Scoring; Future; Expected date: 12/22/2023      Follow up in about 4 months (around 4/22/2024) for Diabetic Check-Up.        12/22/2023 Robert Suarez PA-C

## 2024-02-06 ENCOUNTER — OFFICE VISIT (OUTPATIENT)
Dept: FAMILY MEDICINE | Facility: CLINIC | Age: 64
End: 2024-02-06
Payer: MEDICARE

## 2024-02-06 VITALS
DIASTOLIC BLOOD PRESSURE: 80 MMHG | WEIGHT: 197.75 LBS | TEMPERATURE: 97 F | HEIGHT: 67 IN | SYSTOLIC BLOOD PRESSURE: 120 MMHG | BODY MASS INDEX: 31.04 KG/M2 | HEART RATE: 111 BPM | OXYGEN SATURATION: 98 %

## 2024-02-06 DIAGNOSIS — J06.9 VIRAL URI WITH COUGH: Primary | ICD-10-CM

## 2024-02-06 PROCEDURE — 99213 OFFICE O/P EST LOW 20 MIN: CPT | Mod: S$GLB,,, | Performed by: FAMILY MEDICINE

## 2024-02-06 PROCEDURE — 3288F FALL RISK ASSESSMENT DOCD: CPT | Mod: CPTII,S$GLB,, | Performed by: FAMILY MEDICINE

## 2024-02-06 PROCEDURE — 1160F RVW MEDS BY RX/DR IN RCRD: CPT | Mod: CPTII,S$GLB,, | Performed by: FAMILY MEDICINE

## 2024-02-06 PROCEDURE — 1126F AMNT PAIN NOTED NONE PRSNT: CPT | Mod: CPTII,S$GLB,, | Performed by: FAMILY MEDICINE

## 2024-02-06 PROCEDURE — 3008F BODY MASS INDEX DOCD: CPT | Mod: CPTII,S$GLB,, | Performed by: FAMILY MEDICINE

## 2024-02-06 PROCEDURE — 99999 PR PBB SHADOW E&M-EST. PATIENT-LVL IV: CPT | Mod: PBBFAC,,, | Performed by: FAMILY MEDICINE

## 2024-02-06 PROCEDURE — 1101F PT FALLS ASSESS-DOCD LE1/YR: CPT | Mod: CPTII,S$GLB,, | Performed by: FAMILY MEDICINE

## 2024-02-06 PROCEDURE — 3079F DIAST BP 80-89 MM HG: CPT | Mod: CPTII,S$GLB,, | Performed by: FAMILY MEDICINE

## 2024-02-06 PROCEDURE — 3074F SYST BP LT 130 MM HG: CPT | Mod: CPTII,S$GLB,, | Performed by: FAMILY MEDICINE

## 2024-02-06 PROCEDURE — 1159F MED LIST DOCD IN RCRD: CPT | Mod: CPTII,S$GLB,, | Performed by: FAMILY MEDICINE

## 2024-02-06 RX ORDER — PREDNISONE 20 MG/1
TABLET ORAL
Qty: 10 TABLET | Refills: 0 | Status: SHIPPED | OUTPATIENT
Start: 2024-02-06

## 2024-02-06 RX ORDER — BENZONATATE 100 MG/1
100 CAPSULE ORAL 3 TIMES DAILY PRN
Qty: 45 CAPSULE | Refills: 1 | Status: SHIPPED | OUTPATIENT
Start: 2024-02-06 | End: 2024-04-30

## 2024-02-06 RX ORDER — ALBUTEROL SULFATE 90 UG/1
2 AEROSOL, METERED RESPIRATORY (INHALATION) 4 TIMES DAILY
Qty: 18 G | Refills: 0 | Status: SHIPPED | OUTPATIENT
Start: 2024-02-06 | End: 2024-04-30

## 2024-02-06 NOTE — PROGRESS NOTES
Subjective:       Patient ID: Nubia Flaherty is a 64 y.o. female.    Chief Complaint: No chief complaint on file.    New to me patient here for UC visit.  Cough x 2 weeks; dry; runny nose, clear.  No wheezing or pleuritic pain - some general soreness in chest wall from cough.  A little SOB at times; no HA's.  A week ago had mild subjective fever.  No GI sx's.        Review of Systems   Constitutional:  Negative for fever.   HENT:  Positive for rhinorrhea.    Respiratory:  Positive for cough and shortness of breath.    Cardiovascular:  Negative for chest pain.   Gastrointestinal:  Negative for abdominal pain and nausea.   Skin:  Negative for rash.   All other systems reviewed and are negative.      Objective:      Physical Exam  Vitals reviewed.   Constitutional:       General: She is not in acute distress.     Appearance: She is well-developed.   HENT:      Right Ear: Tympanic membrane is not erythematous.      Left Ear: Tympanic membrane is not erythematous.      Nose: Mucosal edema present.      Right Sinus: No maxillary sinus tenderness.      Left Sinus: No maxillary sinus tenderness.      Mouth/Throat:      Pharynx: Posterior oropharyngeal erythema present.   Cardiovascular:      Rate and Rhythm: Normal rate and regular rhythm.      Heart sounds: No murmur heard.     Comments: HR 88 on exam  Pulmonary:      Effort: Pulmonary effort is normal.      Breath sounds: Normal breath sounds. No wheezing or rales.   Musculoskeletal:      Cervical back: Neck supple.   Lymphadenopathy:      Cervical: No cervical adenopathy.   Neurological:      Mental Status: She is alert.         Assessment:       1. Viral URI with cough        Plan:       Viral URI with cough  -     benzonatate (TESSALON) 100 MG capsule; Take 1 capsule (100 mg total) by mouth 3 (three) times daily as needed for Cough.  Dispense: 45 capsule; Refill: 1  -     albuterol (PROVENTIL/VENTOLIN HFA) 90 mcg/actuation inhaler; Inhale 2 puffs into the lungs 4 (four)  times daily.  Dispense: 18 g; Refill: 0  -     predniSONE (DELTASONE) 20 MG tablet; One BID for 3 days then once a day orally  Dispense: 10 tablet; Refill: 0      Patient Instructions   Fish/Krill Oil - take 2 capsules a day (1,000 mg each but can vary some)     Vitamin C 1,000 mg three times a day     Contact me or your PCP if any worsening or for any new concerns as we discussed.

## 2024-02-06 NOTE — PATIENT INSTRUCTIONS
Fish/Krill Oil - take 2 capsules a day (1,000 mg each but can vary some)     Vitamin C 1,000 mg three times a day     Contact me or your PCP if any worsening or for any new concerns as we discussed.

## 2024-04-24 LAB
LEFT EYE DM RETINOPATHY: NEGATIVE
RIGHT EYE DM RETINOPATHY: NEGATIVE

## 2024-04-30 ENCOUNTER — HOSPITAL ENCOUNTER (OUTPATIENT)
Dept: RADIOLOGY | Facility: HOSPITAL | Age: 64
Discharge: HOME | End: 2024-04-30
Attending: PHYSICIAN ASSISTANT
Payer: MEDICARE

## 2024-04-30 ENCOUNTER — OFFICE VISIT (OUTPATIENT)
Dept: FAMILY MEDICINE | Facility: CLINIC | Age: 64
End: 2024-04-30
Payer: MEDICARE

## 2024-04-30 VITALS
OXYGEN SATURATION: 97 % | HEIGHT: 67 IN | SYSTOLIC BLOOD PRESSURE: 118 MMHG | DIASTOLIC BLOOD PRESSURE: 63 MMHG | RESPIRATION RATE: 18 BRPM | WEIGHT: 195 LBS | HEART RATE: 89 BPM | BODY MASS INDEX: 30.61 KG/M2

## 2024-04-30 DIAGNOSIS — M54.16 LUMBAR RADICULOPATHY: ICD-10-CM

## 2024-04-30 DIAGNOSIS — E11.9 TYPE 2 DIABETES MELLITUS WITHOUT COMPLICATION, WITHOUT LONG-TERM CURRENT USE OF INSULIN: Primary | ICD-10-CM

## 2024-04-30 DIAGNOSIS — M25.561 ACUTE PAIN OF RIGHT KNEE: ICD-10-CM

## 2024-04-30 LAB — HBA1C MFR BLD: 7.3 %

## 2024-04-30 PROCEDURE — 3074F SYST BP LT 130 MM HG: CPT | Mod: CPTII,S$GLB,, | Performed by: PHYSICIAN ASSISTANT

## 2024-04-30 PROCEDURE — 3288F FALL RISK ASSESSMENT DOCD: CPT | Mod: CPTII,S$GLB,, | Performed by: PHYSICIAN ASSISTANT

## 2024-04-30 PROCEDURE — 72110 X-RAY EXAM L-2 SPINE 4/>VWS: CPT | Mod: 26,,, | Performed by: RADIOLOGY

## 2024-04-30 PROCEDURE — 73560 X-RAY EXAM OF KNEE 1 OR 2: CPT | Mod: TC,PO,RT

## 2024-04-30 PROCEDURE — 1159F MED LIST DOCD IN RCRD: CPT | Mod: CPTII,S$GLB,, | Performed by: PHYSICIAN ASSISTANT

## 2024-04-30 PROCEDURE — 73560 X-RAY EXAM OF KNEE 1 OR 2: CPT | Mod: 26,RT,, | Performed by: RADIOLOGY

## 2024-04-30 PROCEDURE — 73521 X-RAY EXAM HIPS BI 2 VIEWS: CPT | Mod: 26,,, | Performed by: RADIOLOGY

## 2024-04-30 PROCEDURE — 1101F PT FALLS ASSESS-DOCD LE1/YR: CPT | Mod: CPTII,S$GLB,, | Performed by: PHYSICIAN ASSISTANT

## 2024-04-30 PROCEDURE — 99214 OFFICE O/P EST MOD 30 MIN: CPT | Mod: S$GLB,,, | Performed by: PHYSICIAN ASSISTANT

## 2024-04-30 PROCEDURE — 83036 HEMOGLOBIN GLYCOSYLATED A1C: CPT | Mod: QW,,, | Performed by: PHYSICIAN ASSISTANT

## 2024-04-30 PROCEDURE — 1125F AMNT PAIN NOTED PAIN PRSNT: CPT | Mod: CPTII,S$GLB,, | Performed by: PHYSICIAN ASSISTANT

## 2024-04-30 PROCEDURE — 73521 X-RAY EXAM HIPS BI 2 VIEWS: CPT | Mod: TC,PO

## 2024-04-30 PROCEDURE — 72110 X-RAY EXAM L-2 SPINE 4/>VWS: CPT | Mod: TC,PO

## 2024-04-30 PROCEDURE — 3008F BODY MASS INDEX DOCD: CPT | Mod: CPTII,S$GLB,, | Performed by: PHYSICIAN ASSISTANT

## 2024-04-30 PROCEDURE — 3078F DIAST BP <80 MM HG: CPT | Mod: CPTII,S$GLB,, | Performed by: PHYSICIAN ASSISTANT

## 2024-04-30 RX ORDER — SEMAGLUTIDE 1.34 MG/ML
1 INJECTION, SOLUTION SUBCUTANEOUS
Qty: 9 ML | Refills: 1 | Status: SHIPPED | OUTPATIENT
Start: 2024-04-30 | End: 2024-10-27
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
## 2024-05-03 ENCOUNTER — PATIENT OUTREACH (OUTPATIENT)
Dept: ADMINISTRATIVE | Facility: HOSPITAL | Age: 64
End: 2024-05-03
Payer: MEDICARE

## 2024-05-03 NOTE — PROGRESS NOTES
Population Health Chart Review & Patient Outreach Details      Additional [facility] Health Notes:               Updates Requested / Reviewed:      Updated Care Coordination Note and Immunizations Reconciliation Completed or Queried: [facility] Topics Overdue:      [facility] Score: 0     Patient is not due for any topics at this time.    Pneumonia Vaccine  Shingles/Zoster Vaccine  RSV Vaccine                  Health Maintenance Topic(s) Outreach Outcomes & Actions Taken:    Eye Exam - Outreach Outcomes & Actions Taken  : Diabetic Eye External Records Uploaded, Care Team & History Updated if Applicable

## 2024-05-06 ENCOUNTER — PATIENT MESSAGE (OUTPATIENT)
Dept: FAMILY MEDICINE | Facility: CLINIC | Age: 64
End: 2024-05-06
Payer: MEDICARE

## 2024-06-06 ENCOUNTER — TELEPHONE (OUTPATIENT)
Dept: FAMILY MEDICINE | Facility: CLINIC | Age: 64
End: 2024-06-06
Payer: MEDICARE

## 2024-06-07 NOTE — TELEPHONE ENCOUNTER
Spoke to patient, adamant that she does not want to take a statin. Will discuss at upcoming visit further

## 2024-06-07 NOTE — TELEPHONE ENCOUNTER
Agree fully that she should be on a statin. I know that she has given pushback, especially because her LDL is not terribly high. Lets just reiterate that she is at higher risk for heart disease and stroke due to her DM2. Would like to start her on rosuavastatin 5mg qhs, rechecking cmp/lipid in 3 mos. If agrees send 90 with 1.

## 2024-06-28 DIAGNOSIS — M25.511 RIGHT SHOULDER PAIN, UNSPECIFIED CHRONICITY: Primary | ICD-10-CM

## 2024-07-01 ENCOUNTER — OFFICE VISIT (OUTPATIENT)
Dept: ORTHOPEDICS | Facility: CLINIC | Age: 64
End: 2024-07-01
Payer: MEDICARE

## 2024-07-01 ENCOUNTER — HOSPITAL ENCOUNTER (OUTPATIENT)
Dept: RADIOLOGY | Facility: HOSPITAL | Age: 64
Discharge: HOME | End: 2024-07-01
Attending: ORTHOPAEDIC SURGERY
Payer: MEDICARE

## 2024-07-01 VITALS — HEIGHT: 67 IN | BODY MASS INDEX: 30.63 KG/M2 | WEIGHT: 195.13 LBS

## 2024-07-01 DIAGNOSIS — M75.21 TENDINITIS OF LONG HEAD OF BICEPS BRACHII OF RIGHT SHOULDER: Primary | ICD-10-CM

## 2024-07-01 DIAGNOSIS — M25.511 RIGHT SHOULDER PAIN, UNSPECIFIED CHRONICITY: ICD-10-CM

## 2024-07-01 DIAGNOSIS — M25.511 ACUTE PAIN OF RIGHT SHOULDER: ICD-10-CM

## 2024-07-01 PROCEDURE — 73030 X-RAY EXAM OF SHOULDER: CPT | Mod: 26,RT,, | Performed by: RADIOLOGY

## 2024-07-01 PROCEDURE — 1160F RVW MEDS BY RX/DR IN RCRD: CPT | Mod: CPTII,S$GLB,, | Performed by: ORTHOPAEDIC SURGERY

## 2024-07-01 PROCEDURE — 73030 X-RAY EXAM OF SHOULDER: CPT | Mod: TC,PO,RT

## 2024-07-01 PROCEDURE — 1125F AMNT PAIN NOTED PAIN PRSNT: CPT | Mod: CPTII,S$GLB,, | Performed by: ORTHOPAEDIC SURGERY

## 2024-07-01 PROCEDURE — 1101F PT FALLS ASSESS-DOCD LE1/YR: CPT | Mod: CPTII,S$GLB,, | Performed by: ORTHOPAEDIC SURGERY

## 2024-07-01 PROCEDURE — 3008F BODY MASS INDEX DOCD: CPT | Mod: CPTII,S$GLB,, | Performed by: ORTHOPAEDIC SURGERY

## 2024-07-01 PROCEDURE — 3288F FALL RISK ASSESSMENT DOCD: CPT | Mod: CPTII,S$GLB,, | Performed by: ORTHOPAEDIC SURGERY

## 2024-07-01 PROCEDURE — 99999 PR PBB SHADOW E&M-EST. PATIENT-LVL III: CPT | Mod: PBBFAC,,, | Performed by: ORTHOPAEDIC SURGERY

## 2024-07-01 PROCEDURE — 99204 OFFICE O/P NEW MOD 45 MIN: CPT | Mod: S$GLB,,, | Performed by: ORTHOPAEDIC SURGERY

## 2024-07-01 PROCEDURE — 1159F MED LIST DOCD IN RCRD: CPT | Mod: CPTII,S$GLB,, | Performed by: ORTHOPAEDIC SURGERY

## 2024-07-01 PROCEDURE — 3051F HG A1C>EQUAL 7.0%<8.0%: CPT | Mod: CPTII,S$GLB,, | Performed by: ORTHOPAEDIC SURGERY

## 2024-07-01 NOTE — PROGRESS NOTES
CC:  64-year-old female presents for evaluation of right shoulder and arm pain.  The patient states her shoulder started hurting about 2 months ago.  She went to physical therapy but did not really get any relief with that.  She is also taking NSAIDs over the last 2 months with no complete relief of her pain.  On average her pain at rest as a 2/10 but gets up to a 8/10 with certain motions.    Past Medical History:   Diagnosis Date    Diabetes mellitus     Hiatal hernia     Hypercholesteremia     Kidney stones     Kidney stones 11/01/2021    removed kidney stone left side    Migraine     Retina disorder     left eye cysts         Past Surgical History:   Procedure Laterality Date    BREAST CYST ASPIRATION      CYSTOSCOPY W/ URETERAL STENT PLACEMENT Left 10/25/2021    Procedure: CYSTOSCOPY, WITH URETERAL STENT INSERTION;  Surgeon: Yonatan Max MD;  Location: [facility] OR;  Service: Urology;  Laterality: Left;    CYSTOURETEROSCOPY WITH RETROGRADE PYELOGRAPHY AND INSERTION OF STENT INTO URETER Left 10/17/2021    Procedure: CYSTOURETEROSCOPY, WITH RETROGRADE PYELOGRAM AND URETERAL STENT INSERTION;  Surgeon: Yonatan Max MD;  Location: [facility] OR;  Service: Urology;  Laterality: Left;    FOOT SURGERY Left 2015    bunion     laryngocele  06/2009    TONSILLECTOMY, ADENOIDECTOMY      URETEROSCOPY Left 10/25/2021    Procedure: URETEROSCOPY;  Surgeon: Yonatan Max MD;  Location: [facility] OR;  Service: Urology;  Laterality: Left;       Current Outpatient Medications on File Prior to Visit   Medication Sig Dispense Refill    coenzyme Q10 100 mg capsule Take 100 mg by mouth once daily.      multivitamin (THERAGRAN) per tablet Take 1 tablet by mouth once daily.      semaglutide (OZEMPIC) 1 mg/dose (4 mg/3 mL) Inject 1 mg into the skin every 7 days. 9 mL 1    turmeric/turmeric ext/pepr ext (TURMERIC-TURMERIC EXT-PEPPER) 500-3 mg Cap Take 1 capsule by mouth once daily.      ascorbic acid, vitamin C, (VITAMIN C) 500 MG  tablet Take 500 mg by mouth once daily. (Patient not taking: Reported on 4/30/2024)      metFORMIN (GLUCOPHAGE-XR) 500 MG ER 24hr tablet Take 2 tablets (1,000 mg total) by mouth 2 (two) times daily with meals. 360 tablet 1    predniSONE (DELTASONE) 20 MG tablet One BID for 3 days then once a day orally (Patient not taking: Reported on 4/30/2024) 10 tablet 0     Current Facility-Administered Medications on File Prior to Visit   Medication Dose Route Frequency Provider Last Rate Last Admin    HYDROmorphone injection 0.5 mg  0.5 mg Intravenous Q5 Min PRN Volpi-Abadie, Jacqueline, MD        lactated ringers infusion   Intravenous Continuous Crystal Mayes MD   Stopped at 10/25/21 1658    LIDOcaine (PF) 10 mg/ml (1%) injection 10 mg  1 mL Intradermal Once Crystal Mayes MD        lorazepam injection 0.25 mg  0.25 mg Intravenous Q15 Min PRN Volpi-Abadie, Jacqueline, MD        ondansetron injection 4 mg  4 mg Intravenous Daily PRN Volpi-Abadie, Jacqueline, MD        oxyCODONE-acetaminophen 5-325 mg per tablet 1 tablet  1 tablet Oral Q3H PRN Volpi-Abadie, Jacqueline, MD        sodium chloride 0.9% flush 3 mL  3 mL Intravenous PRN Volpi-Abadie, Jacqueline, MD           ROS:    Constitution: Denies chills, fever, and sweats.  HENT: Denies headaches or blurry vision.  Cardiovascular: Denies chest pain or irregular heart beat.  Respiratory: Denies cough or shortness of breath.  Gastrointestinal: Denies abdominal pain, nausea, or vomiting.  Genitourinary:  Denies urinary incontinence, bladder and kidney issues  Musculoskeletal:  Denies muscle cramps.  Neurological: Denies dizziness or focal weakness.  Psychiatric/Behavioral: Normal mental status.  Hematologic/Lymphatic: Denies bleeding problem or easy bruising/bleeding.  Skin: Denies rash or suspicious lesions.    Physical examination     Gen - No acute distress, well nourished, well groomed   Eyes - Extraoccular motions intact, pupils equally round and reactive to light  and accommodation   ENT - normocephalic, atruamtic, oropharynx clear   Neck - Supple, no abnormal masses   Cardiovascular - regular rate and rhythm   Pulmonary - clear to auscultation bilaterally, no wheezes, ronchi, or rales   Abdomen - soft, non-tender, non-distended, positive bowel sounds   Psych - The patient is alert and oriented x3 with normal mood and affect    Right Upper Extremity Examination     Skin is intact throughout   Motor is intact distally radial, median, ulnar, AIN, PIN   +2 radial and ulnar pulses   Sensation to light touch is intact distally radial, median, and ulnar     Examination of the Right shoulder:   ROM:   For - 150   Abd - 150   Ext - 50 with pain  Int - T12 with pain    Tenderness to palpation:   Subacromial space - negative  Biceps Tendon - positive  Anterior Glenohumeral Joint - positive  AC joint - negative  Glenohumeral instability - negative  Empty Can test - negative  Speeds test - negative  Sutton/Neers sign - negative  Cross-arm adduction test - negative    X-ray images were examined and personally interpreted by me.  Three views of the right shoulder dated 07/01/2024 show the humeral head well reduced in the glenoid with no advanced arthritic changes and no acute fractures    Dx:  Tendinitis of the long head of the biceps with possible SLAP tear    Plan:  The patient has failed 2 months of non operative treatment including physical therapy and p.o. NSAIDs.  Recommendation is for an MRI of the right shoulder.  Follow up after the MRI is complete.

## 2024-07-03 ENCOUNTER — TELEPHONE (OUTPATIENT)
Dept: FAMILY MEDICINE | Facility: CLINIC | Age: 64
End: 2024-07-03
Payer: MEDICARE

## 2024-07-03 NOTE — TELEPHONE ENCOUNTER
----- Message from Itzel Damian sent at 7/3/2024  2:24 PM CDT -----  Pt needs to reschedule her appointment because she will be having surgery that day. It was 08/26.    [phone]

## 2024-07-08 ENCOUNTER — CLINICAL SUPPORT (OUTPATIENT)
Dept: FAMILY MEDICINE | Facility: CLINIC | Age: 64
End: 2024-07-08
Payer: MEDICARE

## 2024-07-08 ENCOUNTER — HOSPITAL ENCOUNTER (OUTPATIENT)
Dept: RADIOLOGY | Facility: HOSPITAL | Age: 64
Discharge: HOME | End: 2024-07-08
Attending: PHYSICIAN ASSISTANT
Payer: MEDICARE

## 2024-07-08 ENCOUNTER — TELEPHONE (OUTPATIENT)
Dept: FAMILY MEDICINE | Facility: CLINIC | Age: 64
End: 2024-07-08

## 2024-07-08 DIAGNOSIS — R10.9 FLANK PAIN: ICD-10-CM

## 2024-07-08 DIAGNOSIS — R10.9 FLANK PAIN: Primary | ICD-10-CM

## 2024-07-08 LAB
BILIRUB UR QL STRIP: NEGATIVE
GLUCOSE UR QL STRIP: NEGATIVE
KETONES UR QL STRIP: NEGATIVE
LEUKOCYTE ESTERASE UR QL STRIP: NEGATIVE
PH, POC UA: 6
POC BLOOD, URINE: POSITIVE
POC NITRATES, URINE: NEGATIVE
PROT UR QL STRIP: NEGATIVE
SP GR UR STRIP: 1.01 (ref 1–1.03)
UROBILINOGEN UR STRIP-ACNC: NORMAL (ref 0.1–1.1)

## 2024-07-08 PROCEDURE — 81003 URINALYSIS AUTO W/O SCOPE: CPT | Mod: QW,S$GLB,, | Performed by: FAMILY MEDICINE

## 2024-07-08 PROCEDURE — 74019 RADEX ABDOMEN 2 VIEWS: CPT | Mod: TC,PO

## 2024-07-08 PROCEDURE — 74019 RADEX ABDOMEN 2 VIEWS: CPT | Mod: 26,,, | Performed by: RADIOLOGY

## 2024-07-08 NOTE — TELEPHONE ENCOUNTER
----- Message from Tarsha Ramírez sent at 7/8/2024  9:44 AM CDT -----  She has a UTI. Can she be seen or does she need to go to an Urgent Care? Pt's # [phone] GH

## 2024-07-08 NOTE — PROGRESS NOTES
Pt is here for a nurse visit, UA dip. Pt is c/o right side flank pain/discomfort and dysuria. Hx of kidney stones Pt stated that the symptoms started yesterday. Per Flavio, urine looks okay, no indication of a UTI. Would like for the pt to get a KUB to rule out kidney stone. Spoke with pt, pt is willing to do the KUB.

## 2024-07-15 ENCOUNTER — HOSPITAL ENCOUNTER (OUTPATIENT)
Dept: RADIOLOGY | Facility: HOSPITAL | Age: 64
Discharge: HOME | End: 2024-07-15
Attending: ORTHOPAEDIC SURGERY
Payer: MEDICARE

## 2024-07-15 DIAGNOSIS — M25.511 ACUTE PAIN OF RIGHT SHOULDER: ICD-10-CM

## 2024-07-15 PROCEDURE — 73221 MRI JOINT UPR EXTREM W/O DYE: CPT | Mod: 26,RT,, | Performed by: RADIOLOGY

## 2024-07-15 PROCEDURE — 73221 MRI JOINT UPR EXTREM W/O DYE: CPT | Mod: TC,RT

## 2024-07-16 ENCOUNTER — OFFICE VISIT (OUTPATIENT)
Dept: ORTHOPEDICS | Facility: CLINIC | Age: 64
End: 2024-07-16
Payer: MEDICARE

## 2024-07-16 VITALS — HEIGHT: 67 IN | WEIGHT: 195.13 LBS | BODY MASS INDEX: 30.63 KG/M2

## 2024-07-16 DIAGNOSIS — M75.111 PARTIAL NONTRAUMATIC TEAR OF ROTATOR CUFF, RIGHT: Primary | ICD-10-CM

## 2024-07-16 PROCEDURE — 1126F AMNT PAIN NOTED NONE PRSNT: CPT | Mod: CPTII,S$GLB,, | Performed by: ORTHOPAEDIC SURGERY

## 2024-07-16 PROCEDURE — 99999 PR PBB SHADOW E&M-EST. PATIENT-LVL III: CPT | Mod: PBBFAC,,, | Performed by: ORTHOPAEDIC SURGERY

## 2024-07-16 PROCEDURE — 3288F FALL RISK ASSESSMENT DOCD: CPT | Mod: CPTII,S$GLB,, | Performed by: ORTHOPAEDIC SURGERY

## 2024-07-16 PROCEDURE — 3051F HG A1C>EQUAL 7.0%<8.0%: CPT | Mod: CPTII,S$GLB,, | Performed by: ORTHOPAEDIC SURGERY

## 2024-07-16 PROCEDURE — 1101F PT FALLS ASSESS-DOCD LE1/YR: CPT | Mod: CPTII,S$GLB,, | Performed by: ORTHOPAEDIC SURGERY

## 2024-07-16 PROCEDURE — 3008F BODY MASS INDEX DOCD: CPT | Mod: CPTII,S$GLB,, | Performed by: ORTHOPAEDIC SURGERY

## 2024-07-16 PROCEDURE — 99213 OFFICE O/P EST LOW 20 MIN: CPT | Mod: S$GLB,,, | Performed by: ORTHOPAEDIC SURGERY

## 2024-07-16 PROCEDURE — 1159F MED LIST DOCD IN RCRD: CPT | Mod: CPTII,S$GLB,, | Performed by: ORTHOPAEDIC SURGERY

## 2024-07-16 PROCEDURE — 1160F RVW MEDS BY RX/DR IN RCRD: CPT | Mod: CPTII,S$GLB,, | Performed by: ORTHOPAEDIC SURGERY

## 2024-07-16 NOTE — PROGRESS NOTES
CC:  64-year-old female follows up for MRI results of her right shoulder.  She has been having pain in the right shoulder for several months now and has failed p.o. NSAIDs and physical therapy.  Today the patient actually states her shoulder was feeling better.  She reports no pain today.    Past Medical History:   Diagnosis Date    Diabetes mellitus     Hiatal hernia     Hypercholesteremia     Kidney stones     Kidney stones 11/01/2021    removed kidney stone left side    Migraine     Retina disorder     left eye cysts       Past Surgical History:   Procedure Laterality Date    BREAST CYST ASPIRATION      CYSTOSCOPY W/ URETERAL STENT PLACEMENT Left 10/25/2021    Procedure: CYSTOSCOPY, WITH URETERAL STENT INSERTION;  Surgeon: Yonatan Max MD;  Location: [facility] OR;  Service: Urology;  Laterality: Left;    CYSTOURETEROSCOPY WITH RETROGRADE PYELOGRAPHY AND INSERTION OF STENT INTO URETER Left 10/17/2021    Procedure: CYSTOURETEROSCOPY, WITH RETROGRADE PYELOGRAM AND URETERAL STENT INSERTION;  Surgeon: Yonatan Max MD;  Location: [facility] OR;  Service: Urology;  Laterality: Left;    FOOT SURGERY Left 2015    bunion     laryngocele  06/2009    TONSILLECTOMY, ADENOIDECTOMY      URETEROSCOPY Left 10/25/2021    Procedure: URETEROSCOPY;  Surgeon: Yonatan Max MD;  Location: [facility] OR;  Service: Urology;  Laterality: Left;       Current Outpatient Medications on File Prior to Visit   Medication Sig Dispense Refill    coenzyme Q10 100 mg capsule Take 100 mg by mouth once daily.      multivitamin (THERAGRAN) per tablet Take 1 tablet by mouth once daily.      semaglutide (OZEMPIC) 1 mg/dose (4 mg/3 mL) Inject 1 mg into the skin every 7 days. 9 mL 1    turmeric/turmeric ext/pepr ext (TURMERIC-TURMERIC EXT-PEPPER) 500-3 mg Cap Take 1 capsule by mouth once daily.       Current Facility-Administered Medications on File Prior to Visit   Medication Dose Route Frequency Provider Last Rate Last Admin    HYDROmorphone  injection 0.5 mg  0.5 mg Intravenous Q5 Min PRN Volpi-Abadie, Jacqueline, MD        lactated ringers infusion   Intravenous Continuous Crystal Mayes MD   Stopped at 10/25/21 1658    LIDOcaine (PF) 10 mg/ml (1%) injection 10 mg  1 mL Intradermal Once Crystal Mayes MD        lorazepam injection 0.25 mg  0.25 mg Intravenous Q15 Min PRN Volpi-Abadie, Jacqueline, MD        ondansetron injection 4 mg  4 mg Intravenous Daily PRN Volpi-Abadie, Jacqueline, MD        oxyCODONE-acetaminophen 5-325 mg per tablet 1 tablet  1 tablet Oral Q3H PRN Volpi-Abadie, Jacqueline, MD        sodium chloride 0.9% flush 3 mL  3 mL Intravenous PRN Volpi-Abadie, Jacqueline, MD           ROS:    Constitution: Denies chills, fever, and sweats.  HENT: Denies headaches or blurry vision.  Cardiovascular: Denies chest pain or irregular heart beat.  Respiratory: Denies cough or shortness of breath.  Gastrointestinal: Denies abdominal pain, nausea, or vomiting.  Genitourinary:  Denies urinary incontinence, bladder and kidney issues  Musculoskeletal:  Denies muscle cramps.  Neurological: Denies dizziness or focal weakness.  Psychiatric/Behavioral: Normal mental status.  Hematologic/Lymphatic: Denies bleeding problem or easy bruising/bleeding.  Skin: Denies rash or suspicious lesions.    Physical examination     Gen - No acute distress, well nourished, well groomed   Eyes - Extraoccular motions intact, pupils equally round and reactive to light and accommodation   ENT - normocephalic, atruamtic, oropharynx clear   Neck - Supple, no abnormal masses   Cardiovascular - regular rate and rhythm   Pulmonary - clear to auscultation bilaterally, no wheezes, ronchi, or rales   Abdomen - soft, non-tender, non-distended, positive bowel sounds   Psych - The patient is alert and oriented x3 with normal mood and affect    Left Upper Extremity Examination     Skin is intact throughout   Motor is intact distally radial, median, ulnar, AIN, PIN   +2 radial and  ulnar pulses   Sensation to light touch is intact distally radial, median, and ulnar     Examination of the Left shoulder:   ROM:   For - 150   Abd - 150   Ext - 50   Int - T12     Tenderness to palpation:   Subacromial space - negative  Biceps Tendon - negative  Anterior Glenohumeral Joint - negative  AC joint - negative  Glenohumeral instability - negative  Empty Can test - negative  Speeds test - negative  Sutton/Neers sign - negative  Cross-arm adduction test - negative    MR images were examined and personally interpreted by me.  MRI of the right shoulder dated 07/15/2024 shows a partial-thickness tear of the cuff but no full-thickness tearing and no retraction.    Dx:  Partial-thickness cuff tear that is improving    Plan:  We discussed doing a home exercise program and the patient remembers some of the exercises she was shown in physical therapy.  She is going to do the home exercise program and use NSAIDs over-the-counter p.r.n. and she can follow up as needed.

## 2024-08-07 ENCOUNTER — PATIENT MESSAGE (OUTPATIENT)
Dept: FAMILY MEDICINE | Facility: CLINIC | Age: 64
End: 2024-08-07
Payer: MEDICARE

## 2024-08-07 DIAGNOSIS — Z79.899 ENCOUNTER FOR LONG-TERM (CURRENT) USE OF OTHER MEDICATIONS: Primary | ICD-10-CM

## 2024-08-07 DIAGNOSIS — E78.49 OTHER HYPERLIPIDEMIA: ICD-10-CM

## 2024-08-07 DIAGNOSIS — Z00.01 ENCOUNTER FOR GENERAL ADULT MEDICAL EXAMINATION WITH ABNORMAL FINDINGS: ICD-10-CM

## 2024-08-07 DIAGNOSIS — I10 ESSENTIAL HYPERTENSION: ICD-10-CM

## 2024-08-07 DIAGNOSIS — E78.5 HYPERLIPIDEMIA, UNSPECIFIED HYPERLIPIDEMIA TYPE: ICD-10-CM

## 2024-08-07 DIAGNOSIS — E11.9 TYPE 2 DIABETES MELLITUS WITHOUT COMPLICATION, WITHOUT LONG-TERM CURRENT USE OF INSULIN: ICD-10-CM

## 2024-08-19 ENCOUNTER — OFFICE VISIT (OUTPATIENT)
Dept: FAMILY MEDICINE | Facility: CLINIC | Age: 64
End: 2024-08-19
Payer: MEDICARE

## 2024-08-19 VITALS
SYSTOLIC BLOOD PRESSURE: 138 MMHG | DIASTOLIC BLOOD PRESSURE: 64 MMHG | HEIGHT: 67 IN | HEART RATE: 86 BPM | RESPIRATION RATE: 18 BRPM | WEIGHT: 196 LBS | BODY MASS INDEX: 30.76 KG/M2 | OXYGEN SATURATION: 98 %

## 2024-08-19 DIAGNOSIS — E11.9 TYPE 2 DIABETES MELLITUS WITHOUT COMPLICATION, WITHOUT LONG-TERM CURRENT USE OF INSULIN: Primary | ICD-10-CM

## 2024-08-19 DIAGNOSIS — E78.49 OTHER HYPERLIPIDEMIA: ICD-10-CM

## 2024-08-19 PROCEDURE — 1126F AMNT PAIN NOTED NONE PRSNT: CPT | Mod: CPTII,S$GLB,, | Performed by: PHYSICIAN ASSISTANT

## 2024-08-19 PROCEDURE — 3008F BODY MASS INDEX DOCD: CPT | Mod: CPTII,S$GLB,, | Performed by: PHYSICIAN ASSISTANT

## 2024-08-19 PROCEDURE — 2023F DILAT RTA XM W/O RTNOPTHY: CPT | Mod: CPTII,S$GLB,, | Performed by: PHYSICIAN ASSISTANT

## 2024-08-19 PROCEDURE — 3061F NEG MICROALBUMINURIA REV: CPT | Mod: CPTII,S$GLB,, | Performed by: PHYSICIAN ASSISTANT

## 2024-08-19 PROCEDURE — 1159F MED LIST DOCD IN RCRD: CPT | Mod: CPTII,S$GLB,, | Performed by: PHYSICIAN ASSISTANT

## 2024-08-19 PROCEDURE — 3051F HG A1C>EQUAL 7.0%<8.0%: CPT | Mod: CPTII,S$GLB,, | Performed by: PHYSICIAN ASSISTANT

## 2024-08-19 PROCEDURE — 1101F PT FALLS ASSESS-DOCD LE1/YR: CPT | Mod: CPTII,S$GLB,, | Performed by: PHYSICIAN ASSISTANT

## 2024-08-19 PROCEDURE — 3288F FALL RISK ASSESSMENT DOCD: CPT | Mod: CPTII,S$GLB,, | Performed by: PHYSICIAN ASSISTANT

## 2024-08-19 PROCEDURE — 99214 OFFICE O/P EST MOD 30 MIN: CPT | Mod: S$GLB,,, | Performed by: PHYSICIAN ASSISTANT

## 2024-08-19 PROCEDURE — 3075F SYST BP GE 130 - 139MM HG: CPT | Mod: CPTII,S$GLB,, | Performed by: PHYSICIAN ASSISTANT

## 2024-08-19 PROCEDURE — 3078F DIAST BP <80 MM HG: CPT | Mod: CPTII,S$GLB,, | Performed by: PHYSICIAN ASSISTANT

## 2024-08-19 PROCEDURE — 3066F NEPHROPATHY DOC TX: CPT | Mod: CPTII,S$GLB,, | Performed by: PHYSICIAN ASSISTANT

## 2024-08-19 RX ORDER — EZETIMIBE 10 MG/1
10 TABLET ORAL DAILY
Qty: 90 TABLET | Refills: 3 | Status: SHIPPED | OUTPATIENT
Start: 2024-08-19 | End: 2025-08-19

## 2024-08-19 NOTE — PROGRESS NOTES
SUBJECTIVE:    Patient ID: Nubai Flaherty is a 64 y.o. female.    Chief Complaint: Follow-up (No bottles//no refills needed//lab results // pt canceled cataract surgery // pt is concerned about her recent weight gain )    65 yo female presents for regular checkup and refills. Reports that she has been doing pretty well here recently. Planning to eat  and get the exercise back. Hoping to get 20-30 lbs off. A1c at 7.3% and has been on ozempic at 1mg dose. Interested in switching to mounjaro for better efficacy and help with weight loss. Cholesterol spiked today. Starting back zetia and will follow the effect of the diet on this.    Follow-up  Associated symptoms include arthralgias. Pertinent negatives include no abdominal pain, chest pain, chills, congestion, coughing, fatigue, fever, headaches or weakness.       Patient Message on 08/07/2024   Component Date Value Ref Range Status    WBC 08/14/2024 4.4  3.8 - 10.8 Thousand/uL Final    RBC 08/14/2024 4.88  3.80 - 5.10 Million/uL Final    Hemoglobin 08/14/2024 14.7  11.7 - 15.5 g/dL Final    Hematocrit 08/14/2024 45.7 (H)  35.0 - 45.0 % Final    MCV 08/14/2024 93.6  80.0 - 100.0 fL Final    MCH 08/14/2024 30.1  27.0 - 33.0 pg Final    MCHC 08/14/2024 32.2  32.0 - 36.0 g/dL Final    RDW 08/14/2024 13.6  11.0 - 15.0 % Final    Platelets 08/14/2024 249  140 - 400 Thousand/uL Final    MPV 08/14/2024 9.9  7.5 - 12.5 fL Final    Neutrophils, Abs 08/14/2024 1,738  1,500 - 7,800 cells/uL Final    Lymph # 08/14/2024 2,024  850 - 3,900 cells/uL Final    Mono # 08/14/2024 400  200 - 950 cells/uL Final    Eos # 08/14/2024 189  15 - 500 cells/uL Final    Baso # 08/14/2024 48  0 - 200 cells/uL Final    Neutrophils Relative 08/14/2024 39.5  % Final    Lymph % 08/14/2024 46.0  % Final    Mono % 08/14/2024 9.1  % Final    Eosinophil % 08/14/2024 4.3  % Final    Basophil % 08/14/2024 1.1  % Final    Glucose 08/14/2024 151 (H)  65 - 99 mg/dL Final    BUN 08/14/2024 21  7 - 25  mg/dL Final    Creatinine 08/14/2024 1.02  0.50 - 1.05 mg/dL Final    eGFR 08/14/2024 61  > OR = 60 mL/min/1.73m2 Final    BUN/Creatinine Ratio 08/14/2024 SEE NOTE:  6 - 22 (calc) Final    Sodium 08/14/2024 140  135 - 146 mmol/L Final    Potassium 08/14/2024 4.4  3.5 - 5.3 mmol/L Final    Chloride 08/14/2024 106  98 - 110 mmol/L Final    CO2 08/14/2024 25  20 - 32 mmol/L Final    Calcium 08/14/2024 9.7  8.6 - 10.4 mg/dL Final    Total Protein 08/14/2024 6.5  6.1 - 8.1 g/dL Final    Albumin 08/14/2024 3.7  3.6 - 5.1 g/dL Final    Globulin, Total 08/14/2024 2.8  1.9 - 3.7 g/dL (calc) Final    Albumin/Globulin Ratio 08/14/2024 1.3  1.0 - 2.5 (calc) Final    Total Bilirubin 08/14/2024 0.7  0.2 - 1.2 mg/dL Final    Alkaline Phosphatase 08/14/2024 52  37 - 153 U/L Final    AST 08/14/2024 12  10 - 35 U/L Final    ALT 08/14/2024 15  6 - 29 U/L Final    Hemoglobin A1C 08/14/2024 7.3 (H)  <5.7 % of total Hgb Final    Cholesterol 08/14/2024 257 (H)  <200 mg/dL Final    HDL 08/14/2024 50  > OR = 50 mg/dL Final    Triglycerides 08/14/2024 225 (H)  <150 mg/dL Final    LDL Cholesterol 08/14/2024 169 (H)  mg/dL (calc) Final    HDL/Cholesterol Ratio 08/14/2024 5.1 (H)  <5.0 (calc) Final    Non HDL Chol. (LDL+VLDL) 08/14/2024 207 (H)  <130 mg/dL (calc) Final    Creatinine, Urine 08/14/2024 110  20 - 275 mg/dL Final    Microalb, Ur 08/14/2024 0.2  See Note: mg/dL Final    Microalb/Creat Ratio 08/14/2024 2  <30 mg/g creat Final    TSH w/reflex to FT4 08/14/2024 2.29  0.40 - 4.50 mIU/L Final    Color, UA 08/14/2024 YELLOW  YELLOW Final    Appearance, UA 08/14/2024 CLEAR  CLEAR Final    Specific Gravity, UA 08/14/2024 1.017  1.001 - 1.035 Final    pH, UA 08/14/2024 5.5  5.0 - 8.0 Final    Glucose, UA 08/14/2024 NEGATIVE  NEGATIVE Final    Bilirubin, UA 08/14/2024 NEGATIVE  NEGATIVE Final    Ketones, UA 08/14/2024 NEGATIVE  NEGATIVE Final    Occult Blood UA 08/14/2024 NEGATIVE  NEGATIVE Final    Protein, UA 08/14/2024 NEGATIVE  NEGATIVE  Final    Nitrite, UA 08/14/2024 NEGATIVE  NEGATIVE Final    Leukocytes, UA 08/14/2024 TRACE (A)  NEGATIVE Final    WBC Casts, UA 08/14/2024 0-5  < OR = 5 /HPF Final    RBC Casts, UA 08/14/2024 NONE SEEN  < OR = 2 /HPF Final    Squam Epithel, UA 08/14/2024 0-5  < OR = 5 /HPF Final    Bacteria, UA 08/14/2024 NONE SEEN  NONE SEEN /HPF Final    Hyaline Casts, UA 08/14/2024 NONE SEEN  NONE SEEN /LPF Final    Service Cmt: 08/14/2024 SEE COMMENT   Final    Reflexive Urine Culture 08/14/2024 SEE COMMENT   Final    Urine Culture, Routine 08/14/2024 SEE COMMENT   Final   Clinical Support on 07/08/2024   Component Date Value Ref Range Status    POC Blood, Urine 07/08/2024 Positive (A)  Negative Final    POC Bilirubin, Urine 07/08/2024 Negative  Negative Final    POC Urobilinogen, Urine 07/08/2024 normal  0.1 - 1.1 Final    POC Ketones, Urine 07/08/2024 Negative  Negative Final    POC Protein, Urine 07/08/2024 Negative  Negative Final    POC Nitrates, Urine 07/08/2024 Negative  Negative Final    POC Glucose, Urine 07/08/2024 Negative  Negative Final    pH, UA 07/08/2024 6.0   Final    POC Specific Gravity, Urine 07/08/2024 1.015  1.003 - 1.029 Final    POC Leukocytes, Urine 07/08/2024 Negative  Negative Final   Patient Outreach on 05/03/2024   Component Date Value Ref Range Status    Left Eye DM Retinopathy 04/24/2024 Negative   Final    Right Eye DM Retinopathy 04/24/2024 Negative   Final   Office Visit on 04/30/2024   Component Date Value Ref Range Status    Hemoglobin A1C, POC 04/30/2024 7.3  % Final       Past Medical History:   Diagnosis Date    Diabetes mellitus     Hiatal hernia     Hypercholesteremia     Kidney stones     Kidney stones 11/01/2021    removed kidney stone left side    Migraine     Retina disorder     left eye cysts     Past Surgical History:   Procedure Laterality Date    BREAST CYST ASPIRATION      CYSTOSCOPY W/ URETERAL STENT PLACEMENT Left 10/25/2021    Procedure: CYSTOSCOPY, WITH URETERAL STENT  INSERTION;  Surgeon: Yonatan Max MD;  Location: [facility] OR;  Service: Urology;  Laterality: Left;    CYSTOURETEROSCOPY WITH RETROGRADE PYELOGRAPHY AND INSERTION OF STENT INTO URETER Left 10/17/2021    Procedure: CYSTOURETEROSCOPY, WITH RETROGRADE PYELOGRAM AND URETERAL STENT INSERTION;  Surgeon: Yonatan Max MD;  Location: [facility] OR;  Service: Urology;  Laterality: Left;    FOOT SURGERY Left 2015    bunion     laryngocele  06/2009    TONSILLECTOMY, ADENOIDECTOMY      URETEROSCOPY Left 10/25/2021    Procedure: URETEROSCOPY;  Surgeon: Yonatan Max MD;  Location: [facility] OR;  Service: Urology;  Laterality: Left;     Family History   Problem Relation Name Age of Onset    Heart disease Father      Diabetes Father      COPD Father      Heart disease Mother      Diabetes Mother      COPD Mother      Kidney disease Mother      Arthritis Mother      Breast cancer Maternal Aunt      Heart disease Brother      Heart disease Brother      Drug abuse Brother      Ovarian cancer Neg Hx         Marital Status:   Alcohol History:  reports current alcohol use.  Tobacco History:  reports that she has never smoked. She has been exposed to tobacco smoke. She has never used smokeless tobacco.  Drug History:  reports no history of drug use.    Review of patient's allergies indicates:   Allergen Reactions    Codeine Tinitus    Morphine Other (See Comments)     Severe migrain       Current Outpatient Medications:     coenzyme Q10 100 mg capsule, Take 100 mg by mouth once daily., Disp: , Rfl:     multivitamin (THERAGRAN) per tablet, Take 1 tablet by mouth once daily., Disp: , Rfl:     semaglutide (OZEMPIC) 1 mg/dose (4 mg/3 mL), Inject 1 mg into the skin every 7 days., Disp: 9 mL, Rfl: 1    turmeric/turmeric ext/pepr ext (TURMERIC-TURMERIC EXT-PEPPER) 500-3 mg Cap, Take 1 capsule by mouth once daily., Disp: , Rfl:     ezetimibe (ZETIA) 10 mg tablet, Take 1 tablet (10 mg total) by mouth once daily., Disp: 90 tablet, Rfl:  "3    tirzepatide 7.5 mg/0.5 mL PnIj, Inject 7.5 mg into the skin every 7 days., Disp: 6 mL, Rfl: 1  No current facility-administered medications for this visit.    Facility-Administered Medications Ordered in Other Visits:     HYDROmorphone injection 0.5 mg, 0.5 mg, Intravenous, Q5 Min PRN, Volpi-Abadie, Jacqueline, MD    lactated ringers infusion, , Intravenous, Continuous, Crystal Mayes MD, Stopped at 10/25/21 1658    LIDOcaine (PF) 10 mg/ml (1%) injection 10 mg, 1 mL, Intradermal, Once, Crystal Mayes MD    lorazepam injection 0.25 mg, 0.25 mg, Intravenous, Q15 Min PRN, Volpi-Abadie, Jacqueline, MD    ondansetron injection 4 mg, 4 mg, Intravenous, Daily PRN, Volpi-Abadie, Jacqueline, MD    oxyCODONE-acetaminophen 5-325 mg per tablet 1 tablet, 1 tablet, Oral, Q3H PRN, Volpi-Abadie, Jacqueline, MD    sodium chloride 0.9% flush 3 mL, 3 mL, Intravenous, PRN, Volpi-Abadie, Jacqueline, MD    Review of Systems   Constitutional:  Negative for appetite change, chills, fatigue, fever and unexpected weight change.   HENT:  Negative for congestion.    Respiratory:  Negative for cough, chest tightness and shortness of breath.    Cardiovascular:  Negative for chest pain and palpitations.   Gastrointestinal:  Negative for abdominal distention and abdominal pain.   Endocrine: Negative for cold intolerance and heat intolerance.   Genitourinary:  Negative for difficulty urinating and dysuria.   Musculoskeletal:  Positive for arthralgias and back pain.   Neurological:  Negative for dizziness, weakness and headaches.          Objective:      Vitals:    08/19/24 1042   BP: 138/64   Pulse: 86   Resp: 18   SpO2: 98%   Weight: 88.9 kg (196 lb)   Height: 5' 7" (1.702 m)     Physical Exam  Constitutional:       General: She is not in acute distress.     Appearance: She is well-developed.   HENT:      Head: Normocephalic and atraumatic.   Eyes:      Conjunctiva/sclera: Conjunctivae normal.      Pupils: Pupils are equal, round, and " reactive to light.   Neck:      Thyroid: No thyromegaly.   Cardiovascular:      Rate and Rhythm: Normal rate and regular rhythm.      Heart sounds: Normal heart sounds.   Pulmonary:      Effort: Pulmonary effort is normal.      Breath sounds: Normal breath sounds.   Abdominal:      General: Bowel sounds are normal. There is no distension.      Palpations: Abdomen is soft.      Tenderness: There is no abdominal tenderness.   Musculoskeletal:         General: Normal range of motion.      Cervical back: Normal range of motion and neck supple.   Skin:     General: Skin is warm and dry.      Findings: No erythema.   Neurological:      Mental Status: She is alert and oriented to person, place, and time.      Cranial Nerves: No cranial nerve deficit.           Assessment:       1. Type 2 diabetes mellitus without complication, without long-term current use of insulin    2. Other hyperlipidemia         Plan:       Type 2 diabetes mellitus without complication, without long-term current use of insulin  Comments:  switch to mounjaro for efficacy. will let me know if any problems getting filled.  Orders:  -     tirzepatide 7.5 mg/0.5 mL PnIj; Inject 7.5 mg into the skin every 7 days.  Dispense: 6 mL; Refill: 1  -     HEMOGLOBIN A1C; Future; Expected date: 08/19/2024    Other hyperlipidemia  Comments:  ok to start back on zetia now. and will continue to alter the diet. labs rechecked prior to next visit,.  Orders:  -     ezetimibe (ZETIA) 10 mg tablet; Take 1 tablet (10 mg total) by mouth once daily.  Dispense: 90 tablet; Refill: 3  -     COMPREHENSIVE METABOLIC PANEL; Future; Expected date: 08/19/2024  -     Lipid Panel; Future; Expected date: 08/19/2024      Follow up in about 3 months (around 11/19/2024) for Diabetic Check-Up.        8/19/2024 Robert Suarez PA-C

## 2024-11-19 ENCOUNTER — PATIENT MESSAGE (OUTPATIENT)
Dept: FAMILY MEDICINE | Facility: CLINIC | Age: 64
End: 2024-11-19
Payer: MEDICARE

## 2024-11-26 ENCOUNTER — OFFICE VISIT (OUTPATIENT)
Dept: FAMILY MEDICINE | Facility: CLINIC | Age: 64
End: 2024-11-26
Payer: MEDICARE

## 2024-11-26 VITALS
WEIGHT: 198 LBS | OXYGEN SATURATION: 96 % | SYSTOLIC BLOOD PRESSURE: 112 MMHG | BODY MASS INDEX: 31.08 KG/M2 | DIASTOLIC BLOOD PRESSURE: 60 MMHG | HEIGHT: 67 IN | RESPIRATION RATE: 18 BRPM | HEART RATE: 82 BPM

## 2024-11-26 DIAGNOSIS — M79.10 MYALGIA DUE TO STATIN: ICD-10-CM

## 2024-11-26 DIAGNOSIS — T46.6X5A MYALGIA DUE TO STATIN: ICD-10-CM

## 2024-11-26 DIAGNOSIS — Z12.31 ENCOUNTER FOR SCREENING MAMMOGRAM FOR MALIGNANT NEOPLASM OF BREAST: ICD-10-CM

## 2024-11-26 DIAGNOSIS — E11.9 TYPE 2 DIABETES MELLITUS WITHOUT COMPLICATION, WITHOUT LONG-TERM CURRENT USE OF INSULIN: Primary | ICD-10-CM

## 2024-11-26 PROCEDURE — 3288F FALL RISK ASSESSMENT DOCD: CPT | Mod: CPTII,S$GLB,, | Performed by: PHYSICIAN ASSISTANT

## 2024-11-26 PROCEDURE — 2023F DILAT RTA XM W/O RTNOPTHY: CPT | Mod: CPTII,S$GLB,, | Performed by: PHYSICIAN ASSISTANT

## 2024-11-26 PROCEDURE — 3066F NEPHROPATHY DOC TX: CPT | Mod: CPTII,S$GLB,, | Performed by: PHYSICIAN ASSISTANT

## 2024-11-26 PROCEDURE — 3074F SYST BP LT 130 MM HG: CPT | Mod: CPTII,S$GLB,, | Performed by: PHYSICIAN ASSISTANT

## 2024-11-26 PROCEDURE — 1159F MED LIST DOCD IN RCRD: CPT | Mod: CPTII,S$GLB,, | Performed by: PHYSICIAN ASSISTANT

## 2024-11-26 PROCEDURE — 3052F HG A1C>EQUAL 8.0%<EQUAL 9.0%: CPT | Mod: CPTII,S$GLB,, | Performed by: PHYSICIAN ASSISTANT

## 2024-11-26 PROCEDURE — 3008F BODY MASS INDEX DOCD: CPT | Mod: CPTII,S$GLB,, | Performed by: PHYSICIAN ASSISTANT

## 2024-11-26 PROCEDURE — 3078F DIAST BP <80 MM HG: CPT | Mod: CPTII,S$GLB,, | Performed by: PHYSICIAN ASSISTANT

## 2024-11-26 PROCEDURE — 1126F AMNT PAIN NOTED NONE PRSNT: CPT | Mod: CPTII,S$GLB,, | Performed by: PHYSICIAN ASSISTANT

## 2024-11-26 PROCEDURE — 1101F PT FALLS ASSESS-DOCD LE1/YR: CPT | Mod: CPTII,S$GLB,, | Performed by: PHYSICIAN ASSISTANT

## 2024-11-26 PROCEDURE — 3061F NEG MICROALBUMINURIA REV: CPT | Mod: CPTII,S$GLB,, | Performed by: PHYSICIAN ASSISTANT

## 2024-11-26 PROCEDURE — 99214 OFFICE O/P EST MOD 30 MIN: CPT | Mod: S$GLB,,, | Performed by: PHYSICIAN ASSISTANT

## 2024-11-26 RX ORDER — PHENYLEPHRINE HCL 10 MG
1200 TABLET ORAL DAILY
COMMUNITY

## 2024-11-26 RX ORDER — CYANOCOBALAMIN (VITAMIN B-12) 1500 MCG
1000 TABLET,CHEWABLE ORAL DAILY
COMMUNITY

## 2024-11-26 NOTE — PROGRESS NOTES
SUBJECTIVE:    Patient ID: Nubia Flaherty is a 64 y.o. female.    Chief Complaint: Follow-up (No bottles// no refills needed//lab results// pt states she have no complaints today//pt agrees to mammogram //refused flu vaccine )    History of Present Illness    CHIEF COMPLAINT:  Nubia presents today for follow-up on diabetes management.    DIABETES MANAGEMENT:  She reports her A1C has increased to 8.0. She discontinued Ozempic several months ago due to insurance issues. She attempted to restart Metformin but experienced significant side effects, including GI distress and generalized aches and pains, leading her to discontinue the medication. She is currently not taking any diabetic medications.    WEIGHT MANAGEMENT:  She reports successful weight loss and increased energy levels, which she attributes to improved metabolism from taking supplements. Since August, she has been taking joanna, cinnamon, ashwagandha, and turmeric capsules in the morning. She reports significantly increased energy levels and continued weight loss without following a specific diet.    CHOLESTEROL MANAGEMENT:  She previously used Zetia for cholesterol management, as she is unable to tolerate statins due to myalgias. Zetia was discontinued as it was not providing significant benefit. While on Zetia, her LDL levels were maintained around  mg/dL.    EXERCISE AND LIFESTYLE:  She has started chair yoga, consisting of a 14-minute morning stretch routine, to address severe sciatic pain. She recently experienced a fall after slipping on dog vomit. She expresses a desire to continue losing weight and improve her fitness, particularly in preparation for a wedding in April. She reports staying active with various commitments, including meetings and dog training seminars, which involve walking and moving around.    MEDICATIONS:  She is currently taking joanna, cinnamon, ashwagandha, and turmeric capsules.      ROS:  General: -fever, -chills,  -fatigue, -weight gain, +weight loss  Eyes: -vision changes, -redness, -discharge  ENT: -ear pain, -nasal congestion, -sore throat  Cardiovascular: -chest pain, -palpitations, -lower extremity edema  Respiratory: -cough, -shortness of breath  Gastrointestinal: +abdominal pain, -nausea, -vomiting, -diarrhea, -constipation, -blood in stool  Genitourinary: -dysuria, -hematuria, -frequency  Musculoskeletal: -joint pain, +muscle pain  Skin: -rash, -lesion  Neurological: -headache, -dizziness, -numbness, -tingling  Psychiatric: -anxiety, -depression, -sleep difficulty         Office Visit on 08/19/2024   Component Date Value Ref Range Status    Glucose 11/20/2024 183 (H)  65 - 99 mg/dL Final    BUN 11/20/2024 17  7 - 25 mg/dL Final    Creatinine 11/20/2024 1.01  0.50 - 1.05 mg/dL Final    eGFR 11/20/2024 61  > OR = 60 mL/min/1.73m2 Final    BUN/Creatinine Ratio 11/20/2024 SEE NOTE:  6 - 22 (calc) Final    Sodium 11/20/2024 142  135 - 146 mmol/L Final    Potassium 11/20/2024 4.2  3.5 - 5.3 mmol/L Final    Chloride 11/20/2024 103  98 - 110 mmol/L Final    CO2 11/20/2024 29  20 - 32 mmol/L Final    Calcium 11/20/2024 9.3  8.6 - 10.4 mg/dL Final    Total Protein 11/20/2024 6.3  6.1 - 8.1 g/dL Final    Albumin 11/20/2024 3.7  3.6 - 5.1 g/dL Final    Globulin, Total 11/20/2024 2.6  1.9 - 3.7 g/dL (calc) Final    Albumin/Globulin Ratio 11/20/2024 1.4  1.0 - 2.5 (calc) Final    Total Bilirubin 11/20/2024 0.6  0.2 - 1.2 mg/dL Final    Alkaline Phosphatase 11/20/2024 70  37 - 153 U/L Final    AST 11/20/2024 13  10 - 35 U/L Final    ALT 11/20/2024 12  6 - 29 U/L Final    Cholesterol 11/20/2024 214 (H)  <200 mg/dL Final    HDL 11/20/2024 50  > OR = 50 mg/dL Final    Triglycerides 11/20/2024 126  <150 mg/dL Final    LDL Cholesterol 11/20/2024 139 (H)  mg/dL (calc) Final    HDL/Cholesterol Ratio 11/20/2024 4.3  <5.0 (calc) Final    Non HDL Chol. (LDL+VLDL) 11/20/2024 164 (H)  <130 mg/dL (calc) Final    Hemoglobin A1C 11/20/2024 8.0 (H)   <5.7 % of total Hgb Final   Patient Message on 08/07/2024   Component Date Value Ref Range Status    WBC 08/14/2024 4.4  3.8 - 10.8 Thousand/uL Final    RBC 08/14/2024 4.88  3.80 - 5.10 Million/uL Final    Hemoglobin 08/14/2024 14.7  11.7 - 15.5 g/dL Final    Hematocrit 08/14/2024 45.7 (H)  35.0 - 45.0 % Final    MCV 08/14/2024 93.6  80.0 - 100.0 fL Final    MCH 08/14/2024 30.1  27.0 - 33.0 pg Final    MCHC 08/14/2024 32.2  32.0 - 36.0 g/dL Final    RDW 08/14/2024 13.6  11.0 - 15.0 % Final    Platelets 08/14/2024 249  140 - 400 Thousand/uL Final    MPV 08/14/2024 9.9  7.5 - 12.5 fL Final    Neutrophils, Abs 08/14/2024 1,738  1,500 - 7,800 cells/uL Final    Lymph # 08/14/2024 2,024  850 - 3,900 cells/uL Final    Mono # 08/14/2024 400  200 - 950 cells/uL Final    Eos # 08/14/2024 189  15 - 500 cells/uL Final    Baso # 08/14/2024 48  0 - 200 cells/uL Final    Neutrophils Relative 08/14/2024 39.5  % Final    Lymph % 08/14/2024 46.0  % Final    Mono % 08/14/2024 9.1  % Final    Eosinophil % 08/14/2024 4.3  % Final    Basophil % 08/14/2024 1.1  % Final    Glucose 08/14/2024 151 (H)  65 - 99 mg/dL Final    BUN 08/14/2024 21  7 - 25 mg/dL Final    Creatinine 08/14/2024 1.02  0.50 - 1.05 mg/dL Final    eGFR 08/14/2024 61  > OR = 60 mL/min/1.73m2 Final    BUN/Creatinine Ratio 08/14/2024 SEE NOTE:  6 - 22 (calc) Final    Sodium 08/14/2024 140  135 - 146 mmol/L Final    Potassium 08/14/2024 4.4  3.5 - 5.3 mmol/L Final    Chloride 08/14/2024 106  98 - 110 mmol/L Final    CO2 08/14/2024 25  20 - 32 mmol/L Final    Calcium 08/14/2024 9.7  8.6 - 10.4 mg/dL Final    Total Protein 08/14/2024 6.5  6.1 - 8.1 g/dL Final    Albumin 08/14/2024 3.7  3.6 - 5.1 g/dL Final    Globulin, Total 08/14/2024 2.8  1.9 - 3.7 g/dL (calc) Final    Albumin/Globulin Ratio 08/14/2024 1.3  1.0 - 2.5 (calc) Final    Total Bilirubin 08/14/2024 0.7  0.2 - 1.2 mg/dL Final    Alkaline Phosphatase 08/14/2024 52  37 - 153 U/L Final    AST 08/14/2024 12  10 - 35  U/L Final    ALT 08/14/2024 15  6 - 29 U/L Final    Hemoglobin A1C 08/14/2024 7.3 (H)  <5.7 % of total Hgb Final    Cholesterol 08/14/2024 257 (H)  <200 mg/dL Final    HDL 08/14/2024 50  > OR = 50 mg/dL Final    Triglycerides 08/14/2024 225 (H)  <150 mg/dL Final    LDL Cholesterol 08/14/2024 169 (H)  mg/dL (calc) Final    HDL/Cholesterol Ratio 08/14/2024 5.1 (H)  <5.0 (calc) Final    Non HDL Chol. (LDL+VLDL) 08/14/2024 207 (H)  <130 mg/dL (calc) Final    Creatinine, Urine 08/14/2024 110  20 - 275 mg/dL Final    Microalb, Ur 08/14/2024 0.2  See Note: mg/dL Final    Microalb/Creat Ratio 08/14/2024 2  <30 mg/g creat Final    TSH w/reflex to FT4 08/14/2024 2.29  0.40 - 4.50 mIU/L Final    Color, UA 08/14/2024 YELLOW  YELLOW Final    Appearance, UA 08/14/2024 CLEAR  CLEAR Final    Specific Gravity, UA 08/14/2024 1.017  1.001 - 1.035 Final    pH, UA 08/14/2024 5.5  5.0 - 8.0 Final    Glucose, UA 08/14/2024 NEGATIVE  NEGATIVE Final    Bilirubin, UA 08/14/2024 NEGATIVE  NEGATIVE Final    Ketones, UA 08/14/2024 NEGATIVE  NEGATIVE Final    Occult Blood UA 08/14/2024 NEGATIVE  NEGATIVE Final    Protein, UA 08/14/2024 NEGATIVE  NEGATIVE Final    Nitrite, UA 08/14/2024 NEGATIVE  NEGATIVE Final    Leukocytes, UA 08/14/2024 TRACE (A)  NEGATIVE Final    WBC Casts, UA 08/14/2024 0-5  < OR = 5 /HPF Final    RBC Casts, UA 08/14/2024 NONE SEEN  < OR = 2 /HPF Final    Squam Epithel, UA 08/14/2024 0-5  < OR = 5 /HPF Final    Bacteria, UA 08/14/2024 NONE SEEN  NONE SEEN /HPF Final    Hyaline Casts, UA 08/14/2024 NONE SEEN  NONE SEEN /LPF Final    Service Cmt: 08/14/2024 SEE COMMENT   Final    Reflexive Urine Culture 08/14/2024 SEE COMMENT   Final    Urine Culture, Routine 08/14/2024 SEE COMMENT   Final   Clinical Support on 07/08/2024   Component Date Value Ref Range Status    POC Blood, Urine 07/08/2024 Positive (A)  Negative Final    POC Bilirubin, Urine 07/08/2024 Negative  Negative Final    POC Urobilinogen, Urine 07/08/2024 normal   0.1 - 1.1 Final    POC Ketones, Urine 07/08/2024 Negative  Negative Final    POC Protein, Urine 07/08/2024 Negative  Negative Final    POC Nitrates, Urine 07/08/2024 Negative  Negative Final    POC Glucose, Urine 07/08/2024 Negative  Negative Final    pH, UA 07/08/2024 6.0   Final    POC Specific Gravity, Urine 07/08/2024 1.015  1.003 - 1.029 Final    POC Leukocytes, Urine 07/08/2024 Negative  Negative Final       Past Medical History:   Diagnosis Date    Diabetes mellitus     Hiatal hernia     Hypercholesteremia     Kidney stones     Kidney stones 11/01/2021    removed kidney stone left side    Migraine     Retina disorder     left eye cysts     Past Surgical History:   Procedure Laterality Date    BREAST CYST ASPIRATION      CYSTOSCOPY W/ URETERAL STENT PLACEMENT Left 10/25/2021    Procedure: CYSTOSCOPY, WITH URETERAL STENT INSERTION;  Surgeon: Yonatan Max MD;  Location: [facility];  Service: Urology;  Laterality: Left;    CYSTOURETEROSCOPY WITH RETROGRADE PYELOGRAPHY AND INSERTION OF STENT INTO URETER Left 10/17/2021    Procedure: CYSTOURETEROSCOPY, WITH RETROGRADE PYELOGRAM AND URETERAL STENT INSERTION;  Surgeon: Yonatan Max MD;  Location: [facility] OR;  Service: Urology;  Laterality: Left;    FOOT SURGERY Left 2015    bunion     laryngocele  06/2009    TONSILLECTOMY, ADENOIDECTOMY      URETEROSCOPY Left 10/25/2021    Procedure: URETEROSCOPY;  Surgeon: Yonatan Max MD;  Location: [facility] OR;  Service: Urology;  Laterality: Left;     Family History   Problem Relation Name Age of Onset    Heart disease Father      Diabetes Father      COPD Father      Heart disease Mother      Diabetes Mother      COPD Mother      Kidney disease Mother      Arthritis Mother      Breast cancer Maternal Aunt      Heart disease Brother      Heart disease Brother      Drug abuse Brother      Ovarian cancer Neg Hx         Marital Status:   Alcohol History:  reports current alcohol use.  Tobacco History:  reports that  she has never smoked. She has been exposed to tobacco smoke. She has never used smokeless tobacco.  Drug History:  reports no history of drug use.    Review of patient's allergies indicates:   Allergen Reactions    Codeine Tinitus    Morphine Other (See Comments)     Severe migrain    Metformin        Current Outpatient Medications:     ashwagandha extract 500 mg Cap, Take 1,000 mg by mouth once daily., Disp: , Rfl:     cinnamon bark (CINNAMON) 500 mg capsule, Take 1,200 mg by mouth once daily., Disp: , Rfl:     coenzyme Q10 100 mg capsule, Take 100 mg by mouth once daily., Disp: , Rfl:     joanna root extract 50 mg Tab, Take 550 mg by mouth 2 (two) times a day., Disp: , Rfl:     multivitamin (THERAGRAN) per tablet, Take 1 tablet by mouth once daily., Disp: , Rfl:     turmeric/turmeric ext/pepr ext (TURMERIC-TURMERIC EXT-PEPPER) 500-3 mg Cap, Take 1 capsule by mouth once daily., Disp: , Rfl:     ezetimibe (ZETIA) 10 mg tablet, Take 1 tablet (10 mg total) by mouth once daily. (Patient not taking: Reported on 11/26/2024), Disp: 90 tablet, Rfl: 3    tirzepatide 7.5 mg/0.5 mL PnIj, Inject 7.5 mg into the skin every 7 days., Disp: 6 mL, Rfl: 1  No current facility-administered medications for this visit.    Facility-Administered Medications Ordered in Other Visits:     HYDROmorphone injection 0.5 mg, 0.5 mg, Intravenous, Q5 Min PRN, Volpi-Abadie, Jacqueline, MD    lactated ringers infusion, , Intravenous, Continuous, Crystal Mayes MD, Stopped at 10/25/21 1658    LIDOcaine (PF) 10 mg/ml (1%) injection 10 mg, 1 mL, Intradermal, Once, Crystal Mayes MD    lorazepam injection 0.25 mg, 0.25 mg, Intravenous, Q15 Min PRN, Volpi-Abadie, Jacqueline, MD    ondansetron injection 4 mg, 4 mg, Intravenous, Daily PRN, Volpi-Abadie, Jacqueline, MD    oxyCODONE-acetaminophen 5-325 mg per tablet 1 tablet, 1 tablet, Oral, Q3H PRN, Volpi-Abadie, Jacqueline, MD    sodium chloride 0.9% flush 3 mL, 3 mL, Intravenous, PRN,  "Volpi-Abadie, Jacqueline, MD    Objective:      Vitals:    11/26/24 0826   BP: 112/60   Pulse: 82   Resp: 18   SpO2: 96%   Weight: 89.8 kg (198 lb)   Height: 5' 7" (1.702 m)     Physical Exam    Vitals: Weight: 198 lbs.  General: No acute distress. Well-developed. Well-nourished.  Eyes: EOMI. Sclerae anicteric.  HENT: Normocephalic. Atraumatic. Nares patent. Moist oral mucosa.  Ears: Bilateral TMs clear. Bilateral EACs clear.  Cardiovascular: Regular rate. Regular rhythm. No murmurs. No rubs. No gallops. Normal S1, S2.  Respiratory: Normal respiratory effort. Clear to auscultation bilaterally. No rales. No rhonchi. No wheezing.  Abdomen: Soft. Non-tender. Non-distended. Normoactive bowel sounds.  Musculoskeletal: No  obvious deformity.  Extremities: No lower extremity edema.  Neurological: Alert & oriented x3. No slurred speech. Normal gait.  Psychiatric: Normal mood. Normal affect. Good insight. Good judgment.  Skin: Warm. Dry. No rash.         Assessment:       Assessment & Plan    - Considered Tirzepatide (Mounjaro) as a replacement for Ozempic due to superior efficacy and side effect profile  - Continued withholding Zetia, as LDL improved from 170 to 140 without it  - Assessed weight loss, increased metabolism, and use of supplements may be contributing to improved lipid profile  - Evaluated current diabetes management, noting A1C increase to 8.0 after discontinuation of Ozempic    TYPE 2 DIABETES MELLITUS:  - Explained that Mounjaro comes in 4 individual pens, 1 for each week of the month.  - Instructed on auto-injection process: unlock, push against skin, and press button.  - Started Tirzepatide (Mounjaro) as a replacement for Ozempic.  - Discontinued Metformin due to intolerance (GI side effects, aches, and pains).  - Follow up in about 3 months (towards the end of February) to recheck A1C.    HYPERCHOLESTEROLEMIA:  - Continued withholding Zetia.    LUMBAGO WITH SCIATICA:  - Nubia to maintain chair yoga " "practice for sciatic pain relief.    BREAST CANCER SCREENING:  - Mammogram ordered.    DIETARY COUNSELING:  - Nubia to continue with current supplement regimen (joanna, cinnamon, ashwagandha, turmeric).    MEDICATION SIDE EFFECTS:  - Discontinued Metformin due to intolerance (GI side effects, aches, and pains).    OTHER:  - Discussed the elimination of the Medicare Part D "donut hole" next year.       Plan:       Type 2 diabetes mellitus without complication, without long-term current use of insulin  Comments:  switch to mounjaro for efficacy. will let me know if any problems getting filled.  Orders:  -     tirzepatide 7.5 mg/0.5 mL PnIj; Inject 7.5 mg into the skin every 7 days.  Dispense: 6 mL; Refill: 1    Myalgia due to statin    Encounter for screening mammogram for malignant neoplasm of breast  -     Mammo Digital Screening Bilat w/ Robbi; Future; Expected date: 11/26/2024      No follow-ups on file.    This note was generated with the assistance of ambient listening technology. Verbal consent was obtained by the patient and accompanying visitor(s) for the recording of patient appointment to facilitate this note. I attest to having reviewed and edited the generated note for accuracy, though some syntax or spelling errors may persist. Please contact the author of this note for any clarification.          11/26/2024 Robert Suarez PA-C      Answers submitted by the patient for this visit:  Review of Systems Questionnaire (Submitted on 11/19/2024)  activity change: No  unexpected weight change: No  neck pain: No  hearing loss: No  rhinorrhea: No  trouble swallowing: No  eye discharge: No  visual disturbance: No  chest tightness: No  wheezing: No  chest pain: No  palpitations: No  blood in stool: No  constipation: No  vomiting: No  diarrhea: No  polydipsia: No  polyuria: No  difficulty urinating: No  hematuria: No  menstrual problem: No  dysuria: No  joint swelling: No  arthralgias: No  headaches: No  weakness: " No  confusion: No  dysphoric mood: No

## 2025-01-02 ENCOUNTER — TELEPHONE (OUTPATIENT)
Dept: FAMILY MEDICINE | Facility: CLINIC | Age: 65
End: 2025-01-02
Payer: MEDICARE

## 2025-01-02 NOTE — TELEPHONE ENCOUNTER
----- Message from Catrachita sent at 1/2/2025  9:38 AM CST -----  Pt needs a prior auth on leonel   [facility]   [phone]

## 2025-01-03 ENCOUNTER — PATIENT MESSAGE (OUTPATIENT)
Dept: FAMILY MEDICINE | Facility: CLINIC | Age: 65
End: 2025-01-03
Payer: MEDICARE

## 2025-01-14 ENCOUNTER — PATIENT MESSAGE (OUTPATIENT)
Dept: FAMILY MEDICINE | Facility: CLINIC | Age: 65
End: 2025-01-14
Payer: MEDICARE

## 2025-01-14 DIAGNOSIS — R39.9 UTI SYMPTOMS: Primary | ICD-10-CM

## 2025-01-15 RX ORDER — CEFUROXIME AXETIL 500 MG/1
500 TABLET ORAL 2 TIMES DAILY
Qty: 10 TABLET | Refills: 0 | Status: SHIPPED | OUTPATIENT
Start: 2025-01-15 | End: 2025-01-20

## 2025-01-17 DIAGNOSIS — N39.0 E. COLI UTI (URINARY TRACT INFECTION): Primary | ICD-10-CM

## 2025-01-17 DIAGNOSIS — B96.20 E. COLI UTI (URINARY TRACT INFECTION): Primary | ICD-10-CM

## 2025-01-17 LAB
APPEARANCE UR: ABNORMAL
BACTERIA #/AREA URNS HPF: ABNORMAL /HPF
BACTERIA UR CULT: ABNORMAL
BACTERIA UR CULT: ABNORMAL
BILIRUB UR QL STRIP: NEGATIVE
COLOR UR: YELLOW
GLUCOSE UR QL STRIP: NEGATIVE
HGB UR QL STRIP: ABNORMAL
HYALINE CASTS #/AREA URNS LPF: ABNORMAL /LPF
KETONES UR QL STRIP: NEGATIVE
LEUKOCYTE ESTERASE UR QL STRIP: ABNORMAL
NITRITE UR QL STRIP: POSITIVE
PH UR STRIP: 5.5 [PH] (ref 5–8)
PROT UR QL STRIP: ABNORMAL
RBC #/AREA URNS HPF: ABNORMAL /HPF
SERVICE CMNT-IMP: ABNORMAL
SP GR UR STRIP: 1.02 (ref 1–1.03)
SQUAMOUS #/AREA URNS HPF: ABNORMAL /HPF
WBC #/AREA URNS HPF: ABNORMAL /HPF

## 2025-01-17 RX ORDER — SULFAMETHOXAZOLE AND TRIMETHOPRIM 800; 160 MG/1; MG/1
1 TABLET ORAL 2 TIMES DAILY
Qty: 10 TABLET | Refills: 0 | Status: SHIPPED | OUTPATIENT
Start: 2025-01-17

## 2025-03-10 ENCOUNTER — PATIENT MESSAGE (OUTPATIENT)
Dept: ADMINISTRATIVE | Facility: HOSPITAL | Age: 65
End: 2025-03-10
Payer: MEDICARE

## 2025-03-11 ENCOUNTER — OFFICE VISIT (OUTPATIENT)
Dept: FAMILY MEDICINE | Facility: CLINIC | Age: 65
End: 2025-03-11
Payer: MEDICARE

## 2025-03-11 VITALS
HEIGHT: 67 IN | OXYGEN SATURATION: 100 % | RESPIRATION RATE: 18 BRPM | DIASTOLIC BLOOD PRESSURE: 62 MMHG | WEIGHT: 182 LBS | HEART RATE: 99 BPM | SYSTOLIC BLOOD PRESSURE: 136 MMHG | BODY MASS INDEX: 28.56 KG/M2

## 2025-03-11 DIAGNOSIS — K21.9 GASTROESOPHAGEAL REFLUX DISEASE, UNSPECIFIED WHETHER ESOPHAGITIS PRESENT: ICD-10-CM

## 2025-03-11 DIAGNOSIS — Z13.6 ENCOUNTER FOR SCREENING FOR CARDIOVASCULAR DISORDERS: Primary | ICD-10-CM

## 2025-03-11 DIAGNOSIS — E11.9 TYPE 2 DIABETES MELLITUS WITHOUT COMPLICATION, WITHOUT LONG-TERM CURRENT USE OF INSULIN: ICD-10-CM

## 2025-03-11 DIAGNOSIS — Z12.31 ENCOUNTER FOR SCREENING MAMMOGRAM FOR MALIGNANT NEOPLASM OF BREAST: ICD-10-CM

## 2025-03-11 LAB
EKG 12-LEAD: NORMAL
HBA1C MFR BLD: 6.2 %
PR INTERVAL: NORMAL
PRT AXES: NORMAL
QRS DURATION: NORMAL
QT/QTC: NORMAL
VENTRICULAR RATE: NORMAL

## 2025-03-11 PROCEDURE — 93000 ELECTROCARDIOGRAM COMPLETE: CPT | Mod: S$GLB,,, | Performed by: PHYSICIAN ASSISTANT

## 2025-03-11 PROCEDURE — 99214 OFFICE O/P EST MOD 30 MIN: CPT | Mod: S$GLB,,, | Performed by: PHYSICIAN ASSISTANT

## 2025-03-11 PROCEDURE — 1126F AMNT PAIN NOTED NONE PRSNT: CPT | Mod: CPTII,S$GLB,, | Performed by: PHYSICIAN ASSISTANT

## 2025-03-11 PROCEDURE — 1101F PT FALLS ASSESS-DOCD LE1/YR: CPT | Mod: CPTII,S$GLB,, | Performed by: PHYSICIAN ASSISTANT

## 2025-03-11 PROCEDURE — 3288F FALL RISK ASSESSMENT DOCD: CPT | Mod: CPTII,S$GLB,, | Performed by: PHYSICIAN ASSISTANT

## 2025-03-11 PROCEDURE — 3075F SYST BP GE 130 - 139MM HG: CPT | Mod: CPTII,S$GLB,, | Performed by: PHYSICIAN ASSISTANT

## 2025-03-11 PROCEDURE — 83036 HEMOGLOBIN GLYCOSYLATED A1C: CPT | Mod: QW,,, | Performed by: PHYSICIAN ASSISTANT

## 2025-03-11 PROCEDURE — 1159F MED LIST DOCD IN RCRD: CPT | Mod: CPTII,S$GLB,, | Performed by: PHYSICIAN ASSISTANT

## 2025-03-11 PROCEDURE — 3078F DIAST BP <80 MM HG: CPT | Mod: CPTII,S$GLB,, | Performed by: PHYSICIAN ASSISTANT

## 2025-03-11 PROCEDURE — 3008F BODY MASS INDEX DOCD: CPT | Mod: CPTII,S$GLB,, | Performed by: PHYSICIAN ASSISTANT

## 2025-03-11 RX ORDER — PANTOPRAZOLE SODIUM 40 MG/1
40 TABLET, DELAYED RELEASE ORAL DAILY
Qty: 90 TABLET | Refills: 3 | Status: SHIPPED | OUTPATIENT
Start: 2025-03-11 | End: 2026-03-11

## 2025-03-11 RX ORDER — METOPROLOL SUCCINATE 50 MG/1
50 TABLET, EXTENDED RELEASE ORAL DAILY
Qty: 3 TABLET | Refills: 0 | Status: SHIPPED | OUTPATIENT
Start: 2025-03-11 | End: 2025-03-14

## 2025-03-11 NOTE — PROGRESS NOTES
SUBJECTIVE:    Patient ID: Nubia Flaherty is a 65 y.o. female.    Chief Complaint: Follow-up (No bottles// refills needed// pt states she have no complaints today//pt agrees to mammogram )    History of Present Illness    CHIEF COMPLAINT:  Nubia presents today for follow up of diabetes management.    DIABETES:  Her A1C has improved from 8.0 in November to 6.2 currently. She continues Mounjaro 7.5 mg with good response, administering injections in the upper area rather than thighs to avoid bruising. She is unable to tolerate metformin due to GI upset.    GERD:  She experienced severe heartburn episode last night between 2-3am, requiring her to sleep in chair from 3-6am after taking three Tums for symptom relief. Previous day's meals included [employer] for lunch (chalupa and taco) and dinner consisting of chicken wings with barbecue sauce and sweet potato. She reports normally sleeping on multiple pillows and never lying flat. She had an upper endoscopy performed many years ago.    FAMILY HISTORY:  She reports extensive family history of heart attacks.    CARDIOVASCULAR:  Previous cardiac workup by cardiologist determined cholesterol elevation is not genetic in origin.      ROS:  General: -fever, -chills, -fatigue, -weight gain, -weight loss  Eyes: -vision changes, -redness, -discharge  ENT: -ear pain, -nasal congestion, -sore throat  Cardiovascular: -chest pain, -palpitations, -lower extremity edema  Respiratory: -cough, -shortness of breath  Gastrointestinal: -abdominal pain, -nausea, -vomiting, -diarrhea, -constipation, -blood in stool, +change in bowel habits, +heartburn  Genitourinary: -dysuria, -hematuria, -frequency  Musculoskeletal: -joint pain, -muscle pain  Skin: -rash, -lesion  Neurological: -headache, -dizziness, -numbness, -tingling  Psychiatric: -anxiety, -depression, -sleep difficulty         Patient Message on 01/14/2025   Component Date Value Ref Range Status    Color, UA 01/14/2025 YELLOW  YELLOW  Final    Appearance, UA 01/14/2025 CLOUDY (A)  CLEAR Final    Specific Gravity, UA 01/14/2025 1.019  1.001 - 1.035 Final    pH, UA 01/14/2025 5.5  5.0 - 8.0 Final    Glucose, UA 01/14/2025 NEGATIVE  NEGATIVE Final    Bilirubin, UA 01/14/2025 NEGATIVE  NEGATIVE Final    Ketones, UA 01/14/2025 NEGATIVE  NEGATIVE Final    Occult Blood UA 01/14/2025 1+ (A)  NEGATIVE Final    Protein, UA 01/14/2025 2+ (A)  NEGATIVE Final    Nitrite, UA 01/14/2025 POSITIVE (A)  NEGATIVE Final    Leukocytes, UA 01/14/2025 3+ (A)  NEGATIVE Final    WBC Casts, UA 01/14/2025 PACKED (A)  < OR = 5 /HPF Final    RBC Casts, UA 01/14/2025 3-10 (A)  < OR = 2 /HPF Final    Squam Epithel, UA 01/14/2025 NONE SEEN  < OR = 5 /HPF Final    Bacteria, UA 01/14/2025 MANY (A)  NONE SEEN /HPF Final    Hyaline Casts, UA 01/14/2025 0-5 (A)  NONE SEEN /LPF Final    Service Cmt: 01/14/2025 SEE COMMENT   Final    Reflexive Urine Culture 01/14/2025 SEE COMMENT   Final    Urine Culture, Routine 01/14/2025 SEE COMMENT (A)   Final       Past Medical History:   Diagnosis Date    Diabetes mellitus     Hiatal hernia     Hypercholesteremia     Kidney stones     Kidney stones 11/01/2021    removed kidney stone left side    Migraine     Retina disorder     left eye cysts     Past Surgical History:   Procedure Laterality Date    BREAST CYST ASPIRATION      CYSTOSCOPY W/ URETERAL STENT PLACEMENT Left 10/25/2021    Procedure: CYSTOSCOPY, WITH URETERAL STENT INSERTION;  Surgeon: Yonatan Max MD;  Location: [facility] OR;  Service: Urology;  Laterality: Left;    CYSTOURETEROSCOPY WITH RETROGRADE PYELOGRAPHY AND INSERTION OF STENT INTO URETER Left 10/17/2021    Procedure: CYSTOURETEROSCOPY, WITH RETROGRADE PYELOGRAM AND URETERAL STENT INSERTION;  Surgeon: Yonatan Max MD;  Location: [facility] OR;  Service: Urology;  Laterality: Left;    FOOT SURGERY Left 2015    bunion     laryngocele  06/2009    TONSILLECTOMY, ADENOIDECTOMY      URETEROSCOPY Left 10/25/2021    Procedure:  "URETEROSCOPY;  Surgeon: Yonatan Max MD;  Location: [facility];  Service: Urology;  Laterality: Left;     Family History   Problem Relation Name Age of Onset    Heart disease Father      Diabetes Father      COPD Father      Heart disease Mother      Diabetes Mother      COPD Mother      Kidney disease Mother      Arthritis Mother      Breast cancer Maternal Aunt      Heart disease Brother      Heart disease Brother      Drug abuse Brother      Ovarian cancer Neg Hx         Marital Status:   Alcohol History:  reports current alcohol use.  Tobacco History:  reports that she has never smoked. She has been exposed to tobacco smoke. She has never used smokeless tobacco.  Drug History:  reports no history of drug use.    Review of patient's allergies indicates:   Allergen Reactions    Codeine Tinitus    Morphine Other (See Comments)     Severe migrain    Metformin      Current Medications[1]    Objective:      Vitals:    03/11/25 0856   BP: 136/62   Pulse: 99   Resp: 18   SpO2: 100%   Weight: 82.6 kg (182 lb)   Height: 5' 7" (1.702 m)     Physical Exam    General: No acute distress. Well-developed. Well-nourished.  Eyes: EOMI. Sclerae anicteric.  HENT: Normocephalic. Atraumatic. Nares patent. Moist oral mucosa.  Ears: Bilateral TMs clear. Bilateral EACs clear.  Cardiovascular: Regular rate. Regular rhythm. No murmurs. No rubs. No gallops. Normal S1, S2.  Respiratory: Normal respiratory effort. Clear to auscultation bilaterally. No rales. No rhonchi. No wheezing.  Abdomen: Soft. Non-tender. Non-distended. Normoactive bowel sounds.  Musculoskeletal: No  obvious deformity.  Extremities: No lower extremity edema.  Neurological: Alert & oriented x3. No slurred speech. Normal gait.  Psychiatric: Normal mood. Normal affect. Good insight. Good judgment.  Skin: Warm. Dry. No rash.         Assessment:       Assessment & Plan    - Mounjaro 7.5mg effective in lowering A1C from 8.0 to 6.2 and achieving weight loss  - GLP-1 " injection preferred due to steady state throughout week and low hypoglycemia risk  - Considering proton pump inhibitor for GERD symptoms possibly exacerbated by Mounjaro  - EKG showed sinus bradycardia at 59 bpm without concerning ST changes  - Recommend calcium score CT to assess coronary artery plaque burden  - Will consider further cardiac workup (nuclear stress test or angiogram) if calcium score elevated    TYPE 2 DIABETES MELLITUS WITHOUT COMPLICATION, WITHOUT LONG-TERM CURRENT USE OF INSULIN:  - Nubia's A1C has significantly improved from 8.0 in November to 6.2 currently, accompanied by a weight loss of almost 20 lbs.  - This progress is considered 'amazing' and indicates the effectiveness of the current treatment plan.  - We will maintain the current dosage of Mounjaro 7.5mg weekly injection for diabetes management.  - GLP-1 injections offer benefits such as steady state throughout the week and reduced risk of hypoglycemia.  - Annual diabetic eye exam has been ordered.  - Additionally, a Cologuard test is planned for September.    ACID REFLUX:  - Nubia reported severe heartburn last night, waking her up between 2 and 3 AM with a burning sensation from her abdomen up through her esophagus.  - This may be related to Mounjaro (GLP-1 injection) delaying gastric emptying.  - Prescribed Protonix 40mg daily in the morning for management.  - Advised elevating the head of the bed or using multiple pillows when sleeping.  - Instructed to contact the office if nighttime symptoms persist despite treatment.  - If symptoms continue, consider the possibility of esophagitis and suggest an upper endoscopy if needed.    HYPERLIPIDEMIA:  - Monitored patient's cholesterol, which has been borderline in the past.  - Previous tests determined it is not genetic.  - Expressed concern about potential occult cardiovascular issues due to borderline cholesterol and lack of statin use.  - Ordered a calcium score CT to assess for  potential coronary artery blockages.    CARDIOVASCULAR RISK:  - Noted patient's family history of heart attacks and increased risk due to diabetes, despite improved control.  - Performed an EKG showing sinus bradycardia at 59 BPM with no concerning ST changes or electrical activity.  - Ordered calcium score CT of heart to quantify blockage in heart vessels and help with risk stratification.  - Prescribed metoprolol (3 tablets), instructing the patient to take 1 tablet 1 hour before the CT to lower heart rate.  - Planned to consult cardiology if calcium score comes back significantly elevated.       Plan:       Encounter for screening for cardiovascular disorders  -     CT Cardiac Scoring; Future; Expected date: 03/11/2025  -     metoprolol succinate (TOPROL-XL) 50 MG 24 hr tablet; Take 1 tablet (50 mg total) by mouth once daily. for 3 days  Dispense: 3 tablet; Refill: 0    Type 2 diabetes mellitus without complication, without long-term current use of insulin  Comments:  switch to mounjaro for efficacy. will let me know if any problems getting filled.  Orders:  -     POCT HEMOGLOBIN A1C  -     tirzepatide 7.5 mg/0.5 mL PnIj; Inject 7.5 mg into the skin every 7 days.  Dispense: 6 mL; Refill: 1    Gastroesophageal reflux disease, unspecified whether esophagitis present  -     pantoprazole (PROTONIX) 40 MG tablet; Take 1 tablet (40 mg total) by mouth once daily.  Dispense: 90 tablet; Refill: 3  -     POCT EKG 12-LEAD (Manually Resulted by Ordering Provider)    Encounter for screening mammogram for malignant neoplasm of breast  -     Mammo Digital Screening Bilat w/ Robbi (XPD); Future; Expected date: 03/11/2025      No follow-ups on file.    This note was generated with the assistance of ambient listening technology. Verbal consent was obtained by the patient and accompanying visitor(s) for the recording of patient appointment to facilitate this note. I attest to having reviewed and edited the generated note for  accuracy, though some syntax or spelling errors may persist. Please contact the author of this note for any clarification.          3/11/2025 Robert Suarez PA-C      Answers submitted by the patient for this visit:  Review of Systems Questionnaire (Submitted on 3/10/2025)  activity change: No  unexpected weight change: No  neck pain: No  hearing loss: No  rhinorrhea: No  trouble swallowing: No  eye discharge: No  visual disturbance: No  chest tightness: No  wheezing: No  chest pain: No  palpitations: No  blood in stool: No  constipation: No  vomiting: No  diarrhea: No  polydipsia: No  polyuria: No  difficulty urinating: No  hematuria: No  menstrual problem: No  dysuria: No  joint swelling: No  arthralgias: No  headaches: No  weakness: No  confusion: No  dysphoric mood: No         [1]   Current Outpatient Medications:     ashwagandha extract 500 mg Cap, Take 1,000 mg by mouth once daily., Disp: , Rfl:     cinnamon bark (CINNAMON) 500 mg capsule, Take 1,200 mg by mouth once daily., Disp: , Rfl:     coenzyme Q10 100 mg capsule, Take 100 mg by mouth once daily., Disp: , Rfl:     joanna root extract 50 mg Tab, Take 550 mg by mouth 2 (two) times a day., Disp: , Rfl:     multivitamin (THERAGRAN) per tablet, Take 1 tablet by mouth once daily., Disp: , Rfl:     turmeric/turmeric ext/pepr ext (TURMERIC-TURMERIC EXT-PEPPER) 500-3 mg Cap, Take 1 capsule by mouth once daily., Disp: , Rfl:     ezetimibe (ZETIA) 10 mg tablet, Take 1 tablet (10 mg total) by mouth once daily. (Patient not taking: Reported on 3/11/2025), Disp: 90 tablet, Rfl: 3    metoprolol succinate (TOPROL-XL) 50 MG 24 hr tablet, Take 1 tablet (50 mg total) by mouth once daily. for 3 days, Disp: 3 tablet, Rfl: 0    pantoprazole (PROTONIX) 40 MG tablet, Take 1 tablet (40 mg total) by mouth once daily., Disp: 90 tablet, Rfl: 3    tirzepatide 7.5 mg/0.5 mL PnIj, Inject 7.5 mg into the skin every 7 days., Disp: 6 mL, Rfl: 1  No current facility-administered  medications for this visit.    Facility-Administered Medications Ordered in Other Visits:     HYDROmorphone injection 0.5 mg, 0.5 mg, Intravenous, Q5 Min PRN, Volpi-Abadie, Jacqueline, MD    lactated ringers infusion, , Intravenous, Continuous, Crystal Mayes MD, Stopped at 10/25/21 1658    LIDOcaine (PF) 10 mg/ml (1%) injection 10 mg, 1 mL, Intradermal, Once, Crystal Mayes MD    lorazepam injection 0.25 mg, 0.25 mg, Intravenous, Q15 Min PRN, Volpi-Abadie, Jacqueline, MD    ondansetron injection 4 mg, 4 mg, Intravenous, Daily PRN, Volpi-Abadie, Jacqueline, MD    oxyCODONE-acetaminophen 5-325 mg per tablet 1 tablet, 1 tablet, Oral, Q3H PRN, Volpi-Abadie, Jacqueline, MD    sodium chloride 0.9% flush 3 mL, 3 mL, Intravenous, PRN, Volpi-Abadie, Jacqueline, MD

## 2025-03-19 ENCOUNTER — HOSPITAL ENCOUNTER (OUTPATIENT)
Dept: RADIOLOGY | Facility: HOSPITAL | Age: 65
Discharge: HOME | End: 2025-03-19
Attending: PHYSICIAN ASSISTANT
Payer: MEDICARE

## 2025-03-19 ENCOUNTER — RESULTS FOLLOW-UP (OUTPATIENT)
Dept: FAMILY MEDICINE | Facility: CLINIC | Age: 65
End: 2025-03-19

## 2025-03-19 DIAGNOSIS — Z12.31 ENCOUNTER FOR SCREENING MAMMOGRAM FOR MALIGNANT NEOPLASM OF BREAST: ICD-10-CM

## 2025-03-19 DIAGNOSIS — Z13.6 ENCOUNTER FOR SCREENING FOR CARDIOVASCULAR DISORDERS: ICD-10-CM

## 2025-03-19 PROCEDURE — 77067 SCR MAMMO BI INCL CAD: CPT | Mod: 26,,, | Performed by: RADIOLOGY

## 2025-03-19 PROCEDURE — 77063 BREAST TOMOSYNTHESIS BI: CPT | Mod: 26,,, | Performed by: RADIOLOGY

## 2025-03-19 PROCEDURE — 75571 CT HRT W/O DYE W/CA TEST: CPT | Mod: TC

## 2025-03-19 PROCEDURE — 77067 SCR MAMMO BI INCL CAD: CPT | Mod: TC

## 2025-03-19 PROCEDURE — 75571 CT HRT W/O DYE W/CA TEST: CPT | Mod: 26,,, | Performed by: RADIOLOGY

## 2025-03-28 ENCOUNTER — TELEPHONE (OUTPATIENT)
Dept: PHARMACY | Facility: CLINIC | Age: 65
End: 2025-03-28
Payer: MEDICARE

## 2025-03-28 NOTE — TELEPHONE ENCOUNTER
[facility]/Population Health Chart Review & Patient Outreach Details For Medication Adherence Project    Reason for Outreach Encounter: 3rd Party payor non-compliance report ([employer], [employer], [employer], etc)  2.  Patient Outreach Method: Reviewed patient chart  and [employer] message  3.   Medication in question:    Diabetes Medications              tirzepatide 7.5 mg/0.5 mL PnIj Inject 7.5 mg into the skin every 7 days.                 LF 28 ds 2/28/25    4.  Reviewed and or Updates Made To: Patient Chart  5. Outreach Outcomes and/or actions taken: Patient filled medication and is on track to be adherent and Patient reminded to  prescription  Additional Notes:

## 2025-04-02 DIAGNOSIS — E11.9 TYPE 2 DIABETES MELLITUS WITHOUT COMPLICATION, WITHOUT LONG-TERM CURRENT USE OF INSULIN: ICD-10-CM

## 2025-04-02 NOTE — TELEPHONE ENCOUNTER
----- Message from Itzel sent at 4/2/2025 10:34 AM CDT -----  Pt needs a refills on mounjaro to be sent today to [facility] in [place] ([phone]). [facility] is all out.[phone]

## 2025-05-22 ENCOUNTER — TELEPHONE (OUTPATIENT)
Dept: PHARMACY | Facility: CLINIC | Age: 65
End: 2025-05-22
Payer: MEDICARE

## 2025-05-22 NOTE — TELEPHONE ENCOUNTER
[facility]/Population Health Chart Review & Patient Outreach Details For Medication Adherence Project    Reason for Outreach Encounter: 3rd Party payor non-compliance report ([employer], [employer], C, etc)  2.  Patient Outreach Method: Reviewed Patient Chart  3.   Medication in question: mounjaro   LAST FILLED: 5/21/25 for 28 day supply  Diabetes Medications              tirzepatide 7.5 mg/0.5 mL PnIj Inject 7.5 mg into the skin every 7 days.              4.  Reviewed and or Updates Made To: Patient Chart  5. Outreach Outcomes and/or actions taken: Patient filled medication and is on track to be adherent

## 2025-06-17 ENCOUNTER — TELEPHONE (OUTPATIENT)
Dept: PHARMACY | Facility: CLINIC | Age: 65
End: 2025-06-17
Payer: MEDICARE

## 2025-06-20 DIAGNOSIS — E11.9 TYPE 2 DIABETES MELLITUS WITHOUT COMPLICATION, WITHOUT LONG-TERM CURRENT USE OF INSULIN: ICD-10-CM

## 2025-06-23 DIAGNOSIS — Z00.00 ENCOUNTER FOR MEDICARE ANNUAL WELLNESS EXAM: ICD-10-CM

## 2025-08-27 ENCOUNTER — TELEPHONE (OUTPATIENT)
Dept: FAMILY MEDICINE | Facility: CLINIC | Age: 65
End: 2025-08-27
Payer: MEDICARE

## 2025-08-27 DIAGNOSIS — Z00.01 ENCOUNTER FOR GENERAL ADULT MEDICAL EXAMINATION WITH ABNORMAL FINDINGS: ICD-10-CM

## 2025-08-27 DIAGNOSIS — I10 ESSENTIAL HYPERTENSION: ICD-10-CM

## 2025-08-27 DIAGNOSIS — E11.9 TYPE 2 DIABETES MELLITUS WITHOUT COMPLICATION, WITHOUT LONG-TERM CURRENT USE OF INSULIN: ICD-10-CM

## 2025-08-27 DIAGNOSIS — E78.5 HYPERLIPIDEMIA, UNSPECIFIED HYPERLIPIDEMIA TYPE: ICD-10-CM

## 2025-08-27 DIAGNOSIS — Z79.899 ENCOUNTER FOR LONG-TERM (CURRENT) USE OF MEDICATIONS: Primary | ICD-10-CM
